# Patient Record
Sex: FEMALE | Race: WHITE | NOT HISPANIC OR LATINO | Employment: OTHER | URBAN - METROPOLITAN AREA
[De-identification: names, ages, dates, MRNs, and addresses within clinical notes are randomized per-mention and may not be internally consistent; named-entity substitution may affect disease eponyms.]

---

## 2018-10-22 ENCOUNTER — OFFICE VISIT (OUTPATIENT)
Dept: URGENT CARE | Age: 83
End: 2018-10-22
Payer: MEDICARE

## 2018-10-22 VITALS
RESPIRATION RATE: 16 BRPM | TEMPERATURE: 97.4 F | BODY MASS INDEX: 23.74 KG/M2 | SYSTOLIC BLOOD PRESSURE: 165 MMHG | HEIGHT: 63 IN | WEIGHT: 134 LBS | HEART RATE: 76 BPM | DIASTOLIC BLOOD PRESSURE: 74 MMHG | OXYGEN SATURATION: 99 %

## 2018-10-22 DIAGNOSIS — S51.811A SKIN TEAR OF RIGHT FOREARM WITHOUT COMPLICATION, INITIAL ENCOUNTER: ICD-10-CM
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
## 2018-10-22 DIAGNOSIS — S09.90XA INJURY OF HEAD, INITIAL ENCOUNTER: Primary | ICD-10-CM

## 2018-10-22 DIAGNOSIS — T14.8XXA HEMATOMA AND CONTUSION: ICD-10-CM

## 2018-10-22 DIAGNOSIS — M25.511 ACUTE PAIN OF RIGHT SHOULDER: ICD-10-CM

## 2018-10-22 PROCEDURE — 99203 OFFICE O/P NEW LOW 30 MIN: CPT | Performed by: NURSE PRACTITIONER

## 2018-10-22 PROCEDURE — G0463 HOSPITAL OUTPT CLINIC VISIT: HCPCS | Performed by: NURSE PRACTITIONER

## 2018-10-22 RX ORDER — METOPROLOL SUCCINATE 50 MG/1
TABLET, EXTENDED RELEASE ORAL
COMMUNITY
Start: 2018-10-18

## 2018-10-22 RX ORDER — LISINOPRIL 20 MG/1
TABLET ORAL
COMMUNITY
Start: 2018-09-04

## 2018-10-22 RX ORDER — CLOPIDOGREL BISULFATE 75 MG/1
TABLET ORAL
COMMUNITY
Start: 2018-10-12

## 2018-10-22 RX ORDER — FENOFIBRATE 48 MG/1
TABLET, COATED ORAL
COMMUNITY
Start: 2018-09-04

## 2018-10-22 RX ORDER — LEVOTHYROXINE SODIUM 0.1 MG/1
TABLET ORAL
COMMUNITY
Start: 2018-07-24

## 2018-10-22 RX ORDER — ATORVASTATIN CALCIUM 40 MG/1
TABLET, FILM COATED ORAL
COMMUNITY
Start: 2018-09-11

## 2018-10-22 RX ORDER — AMLODIPINE BESYLATE 5 MG/1
TABLET ORAL
COMMUNITY
Start: 2018-09-12

## 2018-10-22 RX ORDER — ASPIRIN 81 MG/1
81 TABLET ORAL DAILY
COMMUNITY

## 2018-10-22 NOTE — PROGRESS NOTES
[facility]        NAME: Suzi Rodriguez is a 81 y o  female  : 1935    MRN: [id number]  DATE: 2018  TIME: 2:36 PM    Assessment and Plan   Injury of head, initial encounter [S09 90XA]  1  Injury of head, initial encounter  Transfer to other facility   2  Hematoma and contusion  Transfer to other facility   3  Fall, initial encounter  Transfer to other facility   4  Acute pain of right shoulder  Transfer to other facility   5  Skin tear of right forearm without complication, initial encounter  Transfer to other facility         Patient Instructions     Patient to proceed to ED for further evaluation  Patient stable upon discharge from office  Follow up with PCP in 3-5 days  Proceed to  ER if symptoms worsen  Chief Complaint     Chief Complaint   Patient presents with    Fall     Pt reports she was standing on her record cabinet on Saturday to hang some curtains when she fell backwards  Pt c/o pain to right arm, right shoulder, and bump on back of head  Pt reports she is on Plavix and 81mg ASA  History of Present Illness       81-year-old female presenting today with reports of fall sustained 2 days ago  She states she was standing on her record cabinet, approximately 2 1/2 feet high, hanging her curtains when she fell backwards  She states she hit her head onto the floor upon impact and her right arm / shoulder on the coffee table  No LOC  She is currently taking plavix and baby ASA  She states she also took two advil yesterday for discomfort  She sustained a 1" skin tear of the right forearm, which she cleansed and covered with neosporin and DSD  No current bleeding  Over the last couple days, she started with significant swelling and ecchymosis along right arm and scapula  She also reports having an "egg" on the back of her head  No headache, dizziness, n/v, blurred vision, eye pressure, etc  Her main concern is the arm pain and bruising   Her  wanted her to proceed to ED for evaluation following the fall but patient declined evaluation  She presents here today on insistence of her   Fall         Review of Systems   Review of Systems   Constitutional: Negative  HENT: Negative  Eyes: Negative  Respiratory: Negative  Cardiovascular: Negative  Musculoskeletal: Positive for arthralgias and myalgias  Skin: Positive for color change and wound  Neurological: Negative  Psychiatric/Behavioral: Negative  Current Medications       Current Outpatient Prescriptions:     amLODIPine (NORVASC) 5 mg tablet, , Disp: , Rfl:     aspirin (ECOTRIN LOW STRENGTH) 81 mg EC tablet, Take 81 mg by mouth daily, Disp: , Rfl:     atorvastatin (LIPITOR) 40 mg tablet, , Disp: , Rfl:     clopidogrel (PLAVIX) 75 mg tablet, , Disp: , Rfl:     fenofibrate (TRICOR) 48 mg tablet, , Disp: , Rfl:     levothyroxine 100 mcg tablet, , Disp: , Rfl:     lisinopril (ZESTRIL) 20 mg tablet, , Disp: , Rfl:     metoprolol succinate (TOPROL-XL) 50 mg 24 hr tablet, , Disp: , Rfl:     Current Allergies     Allergies as of 10/22/2018    (No Known Allergies)            The following portions of the patient's history were reviewed and updated as appropriate: allergies, current medications, past family history, past medical history, past social history, past surgical history and problem list      Past Medical History:   Diagnosis Date    Disease of thyroid gland     Hyperlipidemia     Hypertension     MI [facility]myocardial infarction) ([facility] [address] ) 04/2018    Tubal pregnancy        Past Surgical History:   Procedure Laterality Date    HYSTERECTOMY         History reviewed  No pertinent family history  Medications have been verified          Objective   /74   Pulse 76   Temp (!) 97 4 °F (36 3 °C) (Tympanic)   Resp 16   Ht 5' 3" (1 6 m)   Wt 60 8 kg (134 lb)   SpO2 99%   BMI 23 74 kg/m²        Physical Exam     Physical Exam   Constitutional: She is oriented to person, place, and time  She appears well-developed and well-nourished  No distress  Eyes: Pupils are equal, round, and reactive to light  Conjunctivae and EOM are normal    Cardiovascular: Normal rate, regular rhythm and normal heart sounds  Pulmonary/Chest: Effort normal and breath sounds normal    Neurological: She is alert and oriented to person, place, and time  She has normal strength  No cranial nerve deficit or sensory deficit  GCS eye subscore is 4  GCS verbal subscore is 5  GCS motor subscore is 6  Skin: Skin is warm and dry  Ecchymosis (Significant ecchymosis with swelling noted along right arm and on right scapula ) noted  Psychiatric: She has a normal mood and affect  Her behavior is normal  Judgment and thought content normal    Nursing note and vitals reviewed

## 2019-09-16 ENCOUNTER — TRANSCRIBE ORDERS (OUTPATIENT)
Dept: ADMINISTRATIVE | Facility: HOSPITAL | Age: 84
End: 2019-09-16

## 2019-09-16 DIAGNOSIS — Z78.0 ASYMPTOMATIC POSTMENOPAUSAL STATUS (AGE-RELATED) (NATURAL): Primary | ICD-10-CM

## 2019-09-30 ENCOUNTER — HOSPITAL ENCOUNTER (OUTPATIENT)
Dept: RADIOLOGY | Facility: IMAGING CENTER | Age: 84
Discharge: HOME | End: 2019-09-30
Payer: MEDICARE

## 2019-09-30 DIAGNOSIS — Z78.0 ASYMPTOMATIC POSTMENOPAUSAL STATUS (AGE-RELATED) (NATURAL): ICD-10-CM

## 2019-09-30 PROCEDURE — 77080 DXA BONE DENSITY AXIAL: CPT

## 2020-02-06 ENCOUNTER — TRANSCRIBE ORDERS (OUTPATIENT)
Dept: ADMINISTRATIVE | Facility: HOSPITAL | Age: 85
End: 2020-02-06

## 2020-02-06 DIAGNOSIS — R15.2 DEFECATION URGENCY: ICD-10-CM

## 2020-02-06 DIAGNOSIS — R15.1 SOILING: Primary | ICD-10-CM

## 2020-02-06 DIAGNOSIS — R63.4 LOSS OF WEIGHT: ICD-10-CM

## 2020-02-06 DIAGNOSIS — R19.7 DIARRHEA OF PRESUMED INFECTIOUS ORIGIN: ICD-10-CM

## 2020-02-07 ENCOUNTER — HOSPITAL ENCOUNTER (OUTPATIENT)
Dept: RADIOLOGY | Facility: IMAGING CENTER | Age: 85
Discharge: HOME | End: 2020-02-07
Payer: MEDICARE

## 2020-02-07 DIAGNOSIS — R15.2 DEFECATION URGENCY: ICD-10-CM

## 2020-02-07 DIAGNOSIS — R15.1 SOILING: ICD-10-CM

## 2020-02-07 DIAGNOSIS — R63.4 LOSS OF WEIGHT: ICD-10-CM

## 2020-02-07 DIAGNOSIS — R19.7 DIARRHEA OF PRESUMED INFECTIOUS ORIGIN: ICD-10-CM

## 2020-02-07 PROCEDURE — 74177 CT ABD & PELVIS W/CONTRAST: CPT

## 2020-02-07 RX ADMIN — IOHEXOL 100 ML: 350 INJECTION, SOLUTION INTRAVENOUS at 10:56

## 2020-02-10 ENCOUNTER — APPOINTMENT (OUTPATIENT)
Dept: LAB | Facility: IMAGING CENTER | Age: 85
End: 2020-02-10
Payer: MEDICARE

## 2020-02-10 DIAGNOSIS — R15.1 SOILING: ICD-10-CM

## 2020-02-10 DIAGNOSIS — R19.7 DIARRHEA OF PRESUMED INFECTIOUS ORIGIN: ICD-10-CM

## 2020-02-10 DIAGNOSIS — R15.2 DEFECATION URGENCY: ICD-10-CM

## 2020-02-10 DIAGNOSIS — R63.4 LOSS OF WEIGHT: ICD-10-CM

## 2020-02-10 PROCEDURE — 87015 SPECIMEN INFECT AGNT CONCNTJ: CPT

## 2020-02-10 PROCEDURE — 87177 OVA AND PARASITES SMEARS: CPT

## 2020-02-10 PROCEDURE — 87505 NFCT AGENT DETECTION GI: CPT

## 2020-02-10 PROCEDURE — 87209 SMEAR COMPLEX STAIN: CPT

## 2020-02-10 PROCEDURE — 87206 SMEAR FLUORESCENT/ACID STAI: CPT

## 2020-02-11 ENCOUNTER — TRANSCRIBE ORDERS (OUTPATIENT)
Dept: LAB | Facility: HOSPITAL | Age: 85
End: 2020-02-11

## 2020-02-11 DIAGNOSIS — R15.1 SOILING: Primary | ICD-10-CM

## 2020-02-11 DIAGNOSIS — R15.2 DEFECATION URGENCY: ICD-10-CM

## 2020-02-11 DIAGNOSIS — R63.4 LOSS OF WEIGHT: ICD-10-CM

## 2020-02-11 DIAGNOSIS — R19.7 DIARRHEA OF PRESUMED INFECTIOUS ORIGIN: ICD-10-CM

## 2020-02-11 LAB
C DIFF TOX GENS STL QL NAA+PROBE: NEGATIVE
CAMPYLOBACTER DNA SPEC NAA+PROBE: NORMAL
SALMONELLA DNA SPEC QL NAA+PROBE: NORMAL
SHIGA TOXIN STX GENE SPEC NAA+PROBE: NORMAL
SHIGELLA DNA SPEC QL NAA+PROBE: NORMAL

## 2020-02-12 LAB
CRYPTOSP STL QL ACID FAST STN: NORMAL
G LAMBLIA AG STL QL IA: NEGATIVE
I BELLI SPEC QL ACID FAST MOD KINY STN: NORMAL

## 2020-02-13 ENCOUNTER — APPOINTMENT (OUTPATIENT)
Dept: LAB | Facility: IMAGING CENTER | Age: 85
End: 2020-02-13
Payer: MEDICARE

## 2020-02-13 DIAGNOSIS — R15.1 SOILING: ICD-10-CM

## 2020-02-13 DIAGNOSIS — R15.2 DEFECATION URGENCY: ICD-10-CM

## 2020-02-13 DIAGNOSIS — R63.4 LOSS OF WEIGHT: ICD-10-CM

## 2020-02-13 DIAGNOSIS — R19.7 DIARRHEA OF PRESUMED INFECTIOUS ORIGIN: ICD-10-CM

## 2020-02-13 LAB — O+P STL CONC: NORMAL

## 2020-02-13 PROCEDURE — 82705 FATS/LIPIDS FECES QUAL: CPT

## 2020-02-14 LAB
FAT STL QL: NORMAL
NEUTRAL FAT STL QL: NORMAL

## 2024-07-29 ENCOUNTER — APPOINTMENT (INPATIENT)
Dept: RADIOLOGY | Facility: HOSPITAL | Age: 89
DRG: 091 | End: 2024-07-29
Payer: MEDICARE

## 2024-07-29 ENCOUNTER — HOSPITAL ENCOUNTER (INPATIENT)
Facility: HOSPITAL | Age: 89
LOS: 4 days | Discharge: NURSING, REHAB OR LONG-TERM CARE | DRG: 091 | End: 2024-08-02
Attending: EMERGENCY MEDICINE | Admitting: INTERNAL MEDICINE
Payer: MEDICARE

## 2024-07-29 ENCOUNTER — APPOINTMENT (EMERGENCY)
Dept: RADIOLOGY | Facility: HOSPITAL | Age: 89
DRG: 091 | End: 2024-07-29
Payer: MEDICARE

## 2024-07-29 DIAGNOSIS — R62.7 FAILURE TO THRIVE IN ADULT: ICD-10-CM

## 2024-07-29 DIAGNOSIS — I48.91 RAPID ATRIAL FIBRILLATION (HCC): Primary | ICD-10-CM

## 2024-07-29 DIAGNOSIS — F32.A DEPRESSIVE DISORDER: ICD-10-CM

## 2024-07-29 PROBLEM — N18.30 STAGE 3 CHRONIC KIDNEY DISEASE (HCC): Status: ACTIVE | Noted: 2024-07-29

## 2024-07-29 PROBLEM — K81.9 CHOLECYSTITIS: Status: ACTIVE | Noted: 2024-05-25

## 2024-07-29 PROBLEM — I25.10 CORONARY ARTERY DISEASE INVOLVING NATIVE CORONARY ARTERY OF NATIVE HEART WITHOUT ANGINA PECTORIS: Status: ACTIVE | Noted: 2018-10-19

## 2024-07-29 PROBLEM — I48.0 PAROXYSMAL ATRIAL FIBRILLATION (HCC): Status: ACTIVE | Noted: 2023-01-25

## 2024-07-29 LAB
2HR DELTA HS TROPONIN: 2 NG/L
4HR DELTA HS TROPONIN: -4 NG/L
ALBUMIN SERPL BCG-MCNC: 2.7 G/DL (ref 3.5–5)
ALP SERPL-CCNC: 35 U/L (ref 34–104)
ALT SERPL W P-5'-P-CCNC: 8 U/L (ref 7–52)
ANION GAP SERPL CALCULATED.3IONS-SCNC: 12 MMOL/L (ref 4–13)
AST SERPL W P-5'-P-CCNC: 15 U/L (ref 13–39)
BASOPHILS # BLD AUTO: 0.02 THOUSANDS/ÂΜL (ref 0–0.1)
BASOPHILS NFR BLD AUTO: 0 % (ref 0–1)
BILIRUB SERPL-MCNC: 0.41 MG/DL (ref 0.2–1)
BILIRUB UR QL STRIP: NEGATIVE
BUN SERPL-MCNC: 16 MG/DL (ref 5–25)
CALCIUM ALBUM COR SERPL-MCNC: 9.2 MG/DL (ref 8.3–10.1)
CALCIUM SERPL-MCNC: 8.2 MG/DL (ref 8.4–10.2)
CARDIAC TROPONIN I PNL SERPL HS: 18 NG/L
CARDIAC TROPONIN I PNL SERPL HS: 22 NG/L
CARDIAC TROPONIN I PNL SERPL HS: 24 NG/L
CHLORIDE SERPL-SCNC: 100 MMOL/L (ref 96–108)
CLARITY UR: CLEAR
CO2 SERPL-SCNC: 23 MMOL/L (ref 21–32)
COLOR UR: YELLOW
CREAT SERPL-MCNC: 0.55 MG/DL (ref 0.6–1.3)
EOSINOPHIL # BLD AUTO: 0.04 THOUSAND/ÂΜL (ref 0–0.61)
EOSINOPHIL NFR BLD AUTO: 1 % (ref 0–6)
ERYTHROCYTE [DISTWIDTH] IN BLOOD BY AUTOMATED COUNT: 14.8 % (ref 11.6–15.1)
GFR SERPL CREATININE-BSD FRML MDRD: 84 ML/MIN/1.73SQ M
GLUCOSE SERPL-MCNC: 100 MG/DL (ref 65–140)
GLUCOSE UR STRIP-MCNC: NEGATIVE MG/DL
HCT VFR BLD AUTO: 33 % (ref 34.8–46.1)
HGB BLD-MCNC: 10.9 G/DL (ref 11.5–15.4)
HGB UR QL STRIP.AUTO: ABNORMAL
IMM GRANULOCYTES # BLD AUTO: 0.05 THOUSAND/UL (ref 0–0.2)
IMM GRANULOCYTES NFR BLD AUTO: 1 % (ref 0–2)
KETONES UR STRIP-MCNC: ABNORMAL MG/DL
LACTATE SERPL-SCNC: 0.9 MMOL/L (ref 0.5–2)
LEUKOCYTE ESTERASE UR QL STRIP: NEGATIVE
LYMPHOCYTES # BLD AUTO: 1.55 THOUSANDS/ÂΜL (ref 0.6–4.47)
LYMPHOCYTES NFR BLD AUTO: 25 % (ref 14–44)
MCH RBC QN AUTO: 30.8 PG (ref 26.8–34.3)
MCHC RBC AUTO-ENTMCNC: 33 G/DL (ref 31.4–37.4)
MCV RBC AUTO: 93 FL (ref 82–98)
MONOCYTES # BLD AUTO: 0.49 THOUSAND/ÂΜL (ref 0.17–1.22)
MONOCYTES NFR BLD AUTO: 8 % (ref 4–12)
NEUTROPHILS # BLD AUTO: 3.96 THOUSANDS/ÂΜL (ref 1.85–7.62)
NEUTS SEG NFR BLD AUTO: 65 % (ref 43–75)
NITRITE UR QL STRIP: POSITIVE
NRBC BLD AUTO-RTO: 0 /100 WBCS
PH UR STRIP.AUTO: 6 [PH]
PLATELET # BLD AUTO: 225 THOUSANDS/UL (ref 149–390)
PMV BLD AUTO: 10.8 FL (ref 8.9–12.7)
POTASSIUM SERPL-SCNC: 3.8 MMOL/L (ref 3.5–5.3)
PROT SERPL-MCNC: 5 G/DL (ref 6.4–8.4)
PROT UR STRIP-MCNC: ABNORMAL MG/DL
RBC # BLD AUTO: 3.54 MILLION/UL (ref 3.81–5.12)
SALICYLATES SERPL-MCNC: <5 MG/DL (ref 3–20)
SODIUM SERPL-SCNC: 135 MMOL/L (ref 135–147)
SP GR UR STRIP.AUTO: 1.02 (ref 1–1.03)
UROBILINOGEN UR STRIP-ACNC: <2 MG/DL
WBC # BLD AUTO: 6.11 THOUSAND/UL (ref 4.31–10.16)

## 2024-07-29 PROCEDURE — 84484 ASSAY OF TROPONIN QUANT: CPT

## 2024-07-29 PROCEDURE — 71046 X-RAY EXAM CHEST 2 VIEWS: CPT

## 2024-07-29 PROCEDURE — 80053 COMPREHEN METABOLIC PANEL: CPT

## 2024-07-29 PROCEDURE — 81001 URINALYSIS AUTO W/SCOPE: CPT | Performed by: INTERNAL MEDICINE

## 2024-07-29 PROCEDURE — 85025 COMPLETE CBC W/AUTO DIFF WBC: CPT

## 2024-07-29 PROCEDURE — 96374 THER/PROPH/DIAG INJ IV PUSH: CPT

## 2024-07-29 PROCEDURE — 83605 ASSAY OF LACTIC ACID: CPT

## 2024-07-29 PROCEDURE — 84484 ASSAY OF TROPONIN QUANT: CPT | Performed by: INTERNAL MEDICINE

## 2024-07-29 PROCEDURE — 99223 1ST HOSP IP/OBS HIGH 75: CPT | Performed by: INTERNAL MEDICINE

## 2024-07-29 PROCEDURE — 36415 COLL VENOUS BLD VENIPUNCTURE: CPT

## 2024-07-29 PROCEDURE — 96361 HYDRATE IV INFUSION ADD-ON: CPT

## 2024-07-29 PROCEDURE — 80179 DRUG ASSAY SALICYLATE: CPT

## 2024-07-29 PROCEDURE — 99285 EMERGENCY DEPT VISIT HI MDM: CPT

## 2024-07-29 PROCEDURE — 99285 EMERGENCY DEPT VISIT HI MDM: CPT | Performed by: EMERGENCY MEDICINE

## 2024-07-29 PROCEDURE — 74177 CT ABD & PELVIS W/CONTRAST: CPT

## 2024-07-29 PROCEDURE — 84443 ASSAY THYROID STIM HORMONE: CPT | Performed by: INTERNAL MEDICINE

## 2024-07-29 PROCEDURE — 93005 ELECTROCARDIOGRAM TRACING: CPT

## 2024-07-29 RX ORDER — METOPROLOL TARTRATE 1 MG/ML
5 INJECTION, SOLUTION INTRAVENOUS ONCE
Status: COMPLETED | OUTPATIENT
Start: 2024-07-29 | End: 2024-07-29

## 2024-07-29 RX ORDER — AMOXICILLIN 500 MG/1
1000 CAPSULE ORAL 3 TIMES DAILY
COMMUNITY
Start: 2024-07-27 | End: 2024-08-03

## 2024-07-29 RX ORDER — APIXABAN 2.5 MG/1
2.5 TABLET, FILM COATED ORAL 2 TIMES DAILY
COMMUNITY

## 2024-07-29 RX ORDER — LEVOTHYROXINE SODIUM 88 UG/1
88 TABLET ORAL
Status: DISCONTINUED | OUTPATIENT
Start: 2024-07-30 | End: 2024-08-02 | Stop reason: HOSPADM

## 2024-07-29 RX ORDER — LISINOPRIL 20 MG/1
20 TABLET ORAL DAILY
Status: DISCONTINUED | OUTPATIENT
Start: 2024-07-30 | End: 2024-08-02 | Stop reason: HOSPADM

## 2024-07-29 RX ORDER — LANOLIN ALCOHOL/MO/W.PET/CERES
1 CREAM (GRAM) TOPICAL 2 TIMES DAILY WITH MEALS
Status: DISCONTINUED | OUTPATIENT
Start: 2024-07-30 | End: 2024-08-02 | Stop reason: HOSPADM

## 2024-07-29 RX ORDER — AMOXICILLIN 500 MG/1
1000 CAPSULE ORAL EVERY 8 HOURS SCHEDULED
Status: DISCONTINUED | OUTPATIENT
Start: 2024-07-29 | End: 2024-08-02 | Stop reason: HOSPADM

## 2024-07-29 RX ORDER — DILTIAZEM HYDROCHLORIDE 240 MG/1
240 CAPSULE, COATED, EXTENDED RELEASE ORAL DAILY
Status: DISCONTINUED | OUTPATIENT
Start: 2024-07-30 | End: 2024-08-02 | Stop reason: HOSPADM

## 2024-07-29 RX ORDER — SODIUM CHLORIDE 0.9 % (FLUSH) 0.9 %
10 SYRINGE (ML) INJECTION
COMMUNITY
Start: 2024-07-11 | End: 2024-08-17

## 2024-07-29 RX ORDER — CALCIUM CARBONATE/VITAMIN D3 600 MG-10
TABLET ORAL EVERY OTHER DAY
COMMUNITY
Start: 2024-06-13

## 2024-07-29 RX ORDER — ONDANSETRON 2 MG/ML
4 INJECTION INTRAMUSCULAR; INTRAVENOUS EVERY 6 HOURS PRN
Status: DISCONTINUED | OUTPATIENT
Start: 2024-07-29 | End: 2024-08-02 | Stop reason: HOSPADM

## 2024-07-29 RX ORDER — SODIUM CHLORIDE, SODIUM GLUCONATE, SODIUM ACETATE, POTASSIUM CHLORIDE, MAGNESIUM CHLORIDE, SODIUM PHOSPHATE, DIBASIC, AND POTASSIUM PHOSPHATE .53; .5; .37; .037; .03; .012; .00082 G/100ML; G/100ML; G/100ML; G/100ML; G/100ML; G/100ML; G/100ML
100 INJECTION, SOLUTION INTRAVENOUS CONTINUOUS
Status: DISPENSED | OUTPATIENT
Start: 2024-07-29 | End: 2024-07-30

## 2024-07-29 RX ORDER — DILTIAZEM HYDROCHLORIDE 240 MG/1
240 CAPSULE, COATED, EXTENDED RELEASE ORAL DAILY
COMMUNITY
Start: 2024-07-27

## 2024-07-29 RX ORDER — FLUTICASONE PROPIONATE 50 MCG
2 SPRAY, SUSPENSION (ML) NASAL DAILY
Status: DISCONTINUED | OUTPATIENT
Start: 2024-07-30 | End: 2024-08-02 | Stop reason: HOSPADM

## 2024-07-29 RX ORDER — METOPROLOL SUCCINATE 100 MG/1
100 TABLET, EXTENDED RELEASE ORAL 2 TIMES DAILY
Status: DISCONTINUED | OUTPATIENT
Start: 2024-07-29 | End: 2024-08-02 | Stop reason: HOSPADM

## 2024-07-29 RX ORDER — ACETAMINOPHEN 325 MG/1
650 TABLET ORAL EVERY 6 HOURS PRN
Status: DISCONTINUED | OUTPATIENT
Start: 2024-07-29 | End: 2024-08-02 | Stop reason: HOSPADM

## 2024-07-29 RX ORDER — MOMETASONE FUROATE MONOHYDRATE 50 UG/1
2 SPRAY, METERED NASAL DAILY
COMMUNITY
Start: 2024-06-13

## 2024-07-29 RX ORDER — ATORVASTATIN CALCIUM 40 MG/1
40 TABLET, FILM COATED ORAL
Status: DISCONTINUED | OUTPATIENT
Start: 2024-07-30 | End: 2024-08-02 | Stop reason: HOSPADM

## 2024-07-29 RX ORDER — IBANDRONATE SODIUM 150 MG/1
150 TABLET, FILM COATED ORAL
COMMUNITY
Start: 2024-06-20 | End: 2024-08-17

## 2024-07-29 RX ADMIN — IOHEXOL 80 ML: 350 INJECTION, SOLUTION INTRAVENOUS at 23:43

## 2024-07-29 RX ADMIN — METOPROLOL TARTRATE 5 MG: 1 INJECTION, SOLUTION INTRAVENOUS at 18:03

## 2024-07-29 RX ADMIN — METOROPROLOL TARTRATE 5 MG: 5 INJECTION, SOLUTION INTRAVENOUS at 21:27

## 2024-07-29 RX ADMIN — SODIUM CHLORIDE 1000 ML: 0.9 INJECTION, SOLUTION INTRAVENOUS at 18:00

## 2024-07-29 RX ADMIN — METOPROLOL SUCCINATE 100 MG: 100 TABLET, EXTENDED RELEASE ORAL at 21:26

## 2024-07-29 RX ADMIN — SODIUM CHLORIDE, SODIUM GLUCONATE, SODIUM ACETATE, POTASSIUM CHLORIDE, MAGNESIUM CHLORIDE, SODIUM PHOSPHATE, DIBASIC, AND POTASSIUM PHOSPHATE 100 ML/HR: .53; .5; .37; .037; .03; .012; .00082 INJECTION, SOLUTION INTRAVENOUS at 23:41

## 2024-07-29 RX ADMIN — AMOXICILLIN 1000 MG: 500 CAPSULE ORAL at 22:15

## 2024-07-29 RX ADMIN — APIXABAN 2.5 MG: 2.5 TABLET, FILM COATED ORAL at 22:15

## 2024-07-29 NOTE — ED PROVIDER NOTES
History  Chief Complaint   Patient presents with    Failure To Thrive     Pt sent in by EMS by home healthcare for lack of appetite and unwillingness to get out of bed. Pt c/o some dizziness. Recent hx of a-fib, bacteremia, and gallstones. Recently started on Cardizem.      Patient is an 89-year-old female, past medical history afib with RVR, hypertension, cholecystitis s/p cholecystostomy tube placed 5/28/24, presenting today with a complaint of some lightheadedness. She had had a decreased appetite and has not been eating, family sent her in because she did not want to get out of bed today. She says she has not been taking her medications because she has not been eating.  She also notes she did not urinate yesterday, but was able to this morning.    Denies headache, chest pain, abdominal pain, nausea, vomiting, diarrhea, constipation, blood in her stool, blood in her urine, pain with urination. She has no acute vision changes, admits to blurry vision but says it is not new.    Per EMS her blood glucose was in the 50s, treated with D10. Family was unhappy with care at [facility] so they sent her here, noted that they are unable to care for her at home and are seeking nursing home placement. EMS notes that she has been in afib RVR with a heart rate between 100 and 130 and jumping as high as 200.      History provided by:  EMS personnel and patient      Prior to Admission Medications   Prescriptions Last Dose Informant Patient Reported? Taking?   Calcium Carb-Cholecalciferol 600-10 MG-MCG TABS   Yes Yes   Sig: Take by mouth every other day   Eliquis 2.5 MG   Yes No   Sig: Take 2.5 mg by mouth 2 (two) times a day   amLODIPine (NORVASC) 5 mg tablet Not Taking Self Yes No   Patient not taking: Reported on 7/29/2024   amoxicillin (AMOXIL) 500 mg capsule   Yes Yes   Sig: Take 1,000 mg by mouth Three times a day   aspirin (ECOTRIN LOW STRENGTH) 81 mg EC tablet Not Taking Self Yes No   Sig: Take 81 mg by mouth daily    Patient not taking: Reported on 2024   atorvastatin (LIPITOR) 40 mg tablet  Self Yes No   clopidogrel (PLAVIX) 75 mg tablet Not Taking Self Yes No   Patient not taking: Reported on 2024   diltiazem (CARDIZEM CD) 240 mg 24 hr capsule   Yes Yes   Sig: Take 240 mg by mouth daily   fenofibrate (TRICOR) 48 mg tablet  Self Yes No   ibandronate (BONIVA) 150 MG tablet   Yes Yes   Sig: Take 150 mg by mouth Every month   levothyroxine 100 mcg tablet  Self Yes No   lisinopril (ZESTRIL) 20 mg tablet  Self Yes No   metoprolol succinate (TOPROL-XL) 50 mg 24 hr tablet  Self Yes No   Sig: Take 100 mg by mouth 2 (two) times a day   mometasone (NASONEX) 50 mcg/act nasal spray   Yes Yes   Si sprays into each nostril daily   sertraline (ZOLOFT) 50 mg tablet   Yes Yes   Sig: Take 50 mg by mouth daily   sodium chloride 0.9 % SOLN   Yes Yes   Sig: 10 mL by Intracatheter route      Facility-Administered Medications: None       Past Medical History:   Diagnosis Date    Disease of thyroid gland     Hyperlipidemia     Hypertension     MI (myocardial infarction) (HCC) 2018    Tubal pregnancy        Past Surgical History:   Procedure Laterality Date    HYSTERECTOMY         No family history on file.  I have reviewed and agree with the history as documented.    E-Cigarette/Vaping     E-Cigarette/Vaping Substances     Social History     Tobacco Use    Smoking status: Never    Smokeless tobacco: Never   Substance Use Topics    Alcohol use: No    Drug use: No        Review of Systems    Physical Exam  ED Triage Vitals   Temperature Pulse Respirations Blood Pressure SpO2   24 1658 24 1658 24 1658 24 1658 24 1658   98.7 °F (37.1 °C) (!) 123 18 147/71 97 %      Temp Source Heart Rate Source Patient Position - Orthostatic VS BP Location FiO2 (%)   24 1658 24 1658 24 1658 24 165 --   Axillary Monitor Lying Right arm       Pain Score       24 1807       No Pain              Orthostatic Vital Signs  Vitals:    07/29/24 1658 07/29/24 1807 07/29/24 1900   BP: 147/71 (!) 173/78 166/92   Pulse: (!) 123 95 96   Patient Position - Orthostatic VS: Lying Lying        Physical Exam  Vitals and nursing note reviewed.   Constitutional:       General: She is awake. She is not in acute distress.     Appearance: She is well-developed and underweight. She is not ill-appearing or diaphoretic.   HENT:      Head: Normocephalic and atraumatic.      Mouth/Throat:      Mouth: Mucous membranes are dry.      Pharynx: Oropharynx is clear.   Eyes:      Conjunctiva/sclera: Conjunctivae normal.   Cardiovascular:      Rate and Rhythm: Tachycardia present. Rhythm irregular.      Heart sounds: Murmur heard.   Pulmonary:      Effort: Pulmonary effort is normal. No respiratory distress.      Breath sounds: Normal breath sounds. No wheezing.   Abdominal:      General: There is no distension.      Palpations: Abdomen is soft.      Tenderness: There is no abdominal tenderness.      Comments: Cholecystostomy tube, draining, no erythema around bandaging.   Musculoskeletal:         General: No swelling.      Cervical back: Neck supple.      Right lower leg: No edema.      Left lower leg: No edema.   Skin:     General: Skin is warm and dry.      Capillary Refill: Capillary refill takes less than 2 seconds.   Neurological:      Mental Status: She is alert and oriented to person, place, and time.   Psychiatric:         Mood and Affect: Mood normal.         ED Medications  Medications   amoxicillin (AMOXIL) capsule 1,000 mg (has no administration in time range)   atorvastatin (LIPITOR) tablet 40 mg (has no administration in time range)   calcium carbonate-vitamin D 500 mg-5 mcg tablet 1 tablet (has no administration in time range)   diltiazem (CARDIZEM CD) 24 hr capsule 240 mg (has no administration in time range)   apixaban (ELIQUIS) tablet 2.5 mg (has no administration in time range)   levothyroxine tablet 88 mcg (has  no administration in time range)   lisinopril (ZESTRIL) tablet 20 mg (has no administration in time range)   metoprolol succinate (TOPROL-XL) 24 hr tablet 100 mg (has no administration in time range)   fluticasone (FLONASE) 50 mcg/act nasal spray 2 spray (has no administration in time range)   sertraline (ZOLOFT) tablet 50 mg (has no administration in time range)   metoprolol (LOPRESSOR) injection 5 mg (has no administration in time range)   sodium chloride 0.9 % bolus 1,000 mL (0 mL Intravenous Stopped 7/29/24 1900)   metoprolol (LOPRESSOR) injection 5 mg (5 mg Intravenous Given 7/29/24 1803)       Diagnostic Studies  Results Reviewed       Procedure Component Value Units Date/Time    HS Troponin I 2hr [[id number]] Collected: 07/29/24 2116    Lab Status: No result Specimen: Blood from Arm, Left     HS Troponin I 4hr [[id number]]     Lab Status: No result Specimen: Blood     Comprehensive metabolic panel [[id number]]  (Abnormal) Collected: 07/29/24 1857    Lab Status: Final result Specimen: Blood from Arm, Left Updated: 07/29/24 1930     Sodium 135 mmol/L      Potassium 3.8 mmol/L      Chloride 100 mmol/L      CO2 23 mmol/L      ANION GAP 12 mmol/L      BUN 16 mg/dL      Creatinine 0.55 mg/dL      Glucose 100 mg/dL      Calcium 8.2 mg/dL      Corrected Calcium 9.2 mg/dL      AST 15 U/L      ALT 8 U/L      Alkaline Phosphatase 35 U/L      Total Protein 5.0 g/dL      Albumin 2.7 g/dL      Total Bilirubin 0.41 mg/dL      eGFR 84 ml/min/1.73sq m     Narrative:      National Kidney Disease Foundation guidelines for Chronic Kidney Disease (CKD):     Stage 1 with normal or high GFR (GFR > 90 mL/min/1.73 square meters)    Stage 2 Mild CKD (GFR = 60-89 mL/min/1.73 square meters)    Stage 3A Moderate CKD (GFR = 45-59 mL/min/1.73 square meters)    Stage 3B Moderate CKD (GFR = 30-44 mL/min/1.73 square meters)    Stage 4 Severe CKD (GFR = 15-29 mL/min/1.73 square meters)    Stage 5 End Stage CKD (GFR <15 mL/min/1.73 square  meters)  Note: GFR calculation is accurate only with a steady state creatinine    Salicylate level [[id number]]  (Normal) Collected: 07/29/24 1755    Lab Status: Final result Specimen: Blood from Arm, Left Updated: 07/29/24 1840     Salicylate Lvl <5 mg/dL     HS Troponin 0hr (reflex protocol) [[id number]]  (Normal) Collected: 07/29/24 1755    Lab Status: Final result Specimen: Blood from Arm, Left Updated: 07/29/24 1835     hs TnI 0hr 22 ng/L     Lactic acid, plasma (w/reflex if result > 2.0) [[id number]]  (Normal) Collected: 07/29/24 1758    Lab Status: Final result Specimen: Blood from Arm, Left Updated: 07/29/24 1830     LACTIC ACID 0.9 mmol/L     Narrative:      Result may be elevated if tourniquet was used during collection.    CBC and differential [[id number]]  (Abnormal) Collected: 07/29/24 1755    Lab Status: Final result Specimen: Blood from Arm, Left Updated: 07/29/24 1809     WBC 6.11 Thousand/uL      RBC 3.54 Million/uL      Hemoglobin 10.9 g/dL      Hematocrit 33.0 %      MCV 93 fL      MCH 30.8 pg      MCHC 33.0 g/dL      RDW 14.8 %      MPV 10.8 fL      Platelets 225 Thousands/uL      nRBC 0 /100 WBCs      Segmented % 65 %      Immature Grans % 1 %      Lymphocytes % 25 %      Monocytes % 8 %      Eosinophils Relative 1 %      Basophils Relative 0 %      Absolute Neutrophils 3.96 Thousands/µL      Absolute Immature Grans 0.05 Thousand/uL      Absolute Lymphocytes 1.55 Thousands/µL      Absolute Monocytes 0.49 Thousand/µL      Eosinophils Absolute 0.04 Thousand/µL      Basophils Absolute 0.02 Thousands/µL     UA w Reflex to Microscopic w Reflex to Culture [[id number]]     Lab Status: No result Specimen: Urine                    XR chest 2 views   ED Interpretation by Pamella Santizo DO (07/29 1801)   I do not appreciate any acute cardiopulmonary findings.      Final Result by Katya Jaime MD (07/29 2018)      No definite acute pulmonary parenchymal disease.      Probable mild pulmonary fibrosis.       Trace effusions.            Workstation performed: [id number]               Procedures  ECG 12 Lead Documentation Only    Date/Time: 7/29/2024 6:08 PM    Performed by: Pamella Santizo DO  Authorized by: Pamella Santizo DO    ECG reviewed by me, the ED Provider: yes    Patient location:  ED  Previous ECG:     Previous ECG:  Unavailable  Interpretation:     Interpretation: abnormal    Quality:     Tracing quality:  Limited by artifact  Rate:     ECG rate assessment: tachycardic    Rhythm:     Rhythm: atrial fibrillation    QRS:     QRS axis:  Normal    QRS intervals:  Normal  ST segments:     ST segments:  Normal        ED Course  ED Course as of 07/29/24 2120 Mon Jul 29, 2024 1810 Hemoglobin(!): 10.9   1812 Pulse: 95  Responded to metoprolol   1813 Patient states dizziness/lightheadedness has improved slightly since she has been here, but not resolved.   1839 hs TnI 0hr: 22 2043 She feels better, asked for something to eat. She is eating a turkey sandwich and a cookie.                              SBIRT 20yo+      Flowsheet Row Most Recent Value   Initial Alcohol Screen: US AUDIT-C     1. How often do you have a drink containing alcohol? 0 Filed at: 07/29/2024 1809   2. How many drinks containing alcohol do you have on a typical day you are drinking?  0 Filed at: 07/29/2024 1809   3a. Male UNDER 65: How often do you have five or more drinks on one occasion? 0 Filed at: 07/29/2024 1809   3b. FEMALE Any Age, or MALE 65+: How often do you have 4 or more drinks on one occassion? 0 Filed at: 07/29/2024 1809   Audit-C Score 0 Filed at: 07/29/2024 1809   [place]: How many times in the past year have you...    Used an illegal drug or used a prescription medication for non-medical reasons? Never Filed at: 07/29/2024 1809                  Medical Decision Making  Patient is a 89 y.o. female  who presents to the ED with failure to thrive.    Vital signs tachycardic, otherwise stable. Exam as listed  above    Differential diagnosis includes but is not limited to afib with RVR possibly secondary to infectious cause (cholecystostomy tube, UTI), dehydration, starvation ketosis, anemia.    Plan   CBC to evaluate for anemia, leukocytosis to rule out infectious cause of symptoms.  CMP to evaluate fluid status, electrolyte derangement  Troponin   UA to rule out UTI   Lactate   Salicylate level to rule out salicylate toxicity as patient is hypoglycemic, fatigued, tachycardic.  ECG to evaluate atrial fibrillation  Chest xray to rule out pneumonia    View ED course above for further discussion on patient workup.     All labs reviewed and utilized in the medical decision making process  All radiology studies independently viewed by me and interpreted by the radiologist.  I reviewed all testing with the patient.         Amount and/or Complexity of Data Reviewed  Independent Historian: EMS  External Data Reviewed: notes.  Labs: ordered. Decision-making details documented in ED Course.  Radiology: ordered and independent interpretation performed.    Risk  Prescription drug management.  Decision regarding hospitalization.          Disposition  Final diagnoses:   Rapid atrial fibrillation (HCC)   Failure to thrive in adult     Time reflects when diagnosis was documented in both MDM as applicable and the Disposition within this note       Time User Action Codes Description Comment    7/29/2024  7:54 PM Dion Jo Add [I48.91] Rapid atrial fibrillation (HCC)     7/29/2024  7:54 PM Dion Jo Add [R62.51] Failure to thrive (child)     7/29/2024  8:32 PM Pamella Santizo Add [R62.7] Failure to thrive in adult     7/29/2024  8:32 PM Pamella Santizo Remove [R62.51] Failure to thrive (child)           ED Disposition       ED Disposition   Admit    Condition   Stable    Date/Time   Mon Jul 29, 2024 2032    Comment   Case was discussed with Dr. Holley and the patient's admission status was agreed to be Admission Status:  inpatient status.               Follow-up Information    None         Patient's Medications   Discharge Prescriptions    No medications on file     No discharge procedures on file.    PDMP Review         Value Time User    PDMP Reviewed  Yes 7/29/2024  9:16 PM Abraham Holley DO             ED Provider  Attending physically available and evaluated Jennifer Patel. I managed the patient along with the ED Attending.    Electronically Signed by           Pamella Santizo DO  07/29/24 7257

## 2024-07-29 NOTE — ED ATTENDING ATTESTATION
7/29/2024  I, Dion Jo MD, saw and evaluated the patient. I have discussed the patient with the resident/non-physician practitioner and agree with the resident's/non-physician practitioner's findings, Plan of Care, and MDM as documented in the resident's/non-physician practitioner's note, except where noted. All available labs and Radiology studies were reviewed.  I was present for key portions of any procedure(s) performed by the resident/non-physician practitioner and I was immediately available to provide assistance.       At this point I agree with the current assessment done in the Emergency Department.  I have conducted an independent evaluation of this patient a history and physical is as follows:    89-year-old woman presenting with failure to thrive.  Per EMS report family to the patient is not eating and is not getting out of bed.  Patient does endorse both of these things.  She tells me she has no appetite.  Denies any abdominal pain, nausea or vomiting.  On initial evaluation patient is pale, tired, malnourished, and chronically ill-appearing.  Head atraumatic normocephalic.  Dry mucous membranes.  Oropharynx clear.  Heart is tachycardic, irregularly irregular, with no murmurs rubs or gallops.  Lungs clear to auscultation bilaterally.  Abdomen is scaphoid, nontender, soft.  Skin warm and dry.  No extremity swelling or edema.  No gross focal neurologic deficit.    Patient had rapid atrial fibrillation with heart rate going up as high as the 140s to 150s.  She was treated with IV fluids and with IV metoprolol.  This led to improvement in heart rate to under 100.  EKG and labs done.  Plan admission given presentation of rapid atrial fibrillation and failure to thrive.    ED Course         Critical Care Time  Procedures

## 2024-07-30 PROBLEM — R26.81 UNSTEADINESS ON FEET: Status: ACTIVE | Noted: 2024-07-30

## 2024-07-30 PROBLEM — E87.8 ELECTROLYTE ABNORMALITY: Status: ACTIVE | Noted: 2024-07-30

## 2024-07-30 PROBLEM — R82.90 ABNORMAL URINALYSIS: Status: ACTIVE | Noted: 2024-07-30

## 2024-07-30 PROBLEM — E43 SEVERE PROTEIN-CALORIE MALNUTRITION (HCC): Status: ACTIVE | Noted: 2024-07-30

## 2024-07-30 LAB
ANION GAP SERPL CALCULATED.3IONS-SCNC: 9 MMOL/L (ref 4–13)
ATRIAL RATE: 125 BPM
BACTERIA UR QL AUTO: ABNORMAL /HPF
BUDDING YEAST: PRESENT
BUN SERPL-MCNC: 12 MG/DL (ref 5–25)
CALCIUM SERPL-MCNC: 7.7 MG/DL (ref 8.4–10.2)
CHLORIDE SERPL-SCNC: 102 MMOL/L (ref 96–108)
CO2 SERPL-SCNC: 27 MMOL/L (ref 21–32)
CREAT SERPL-MCNC: 0.6 MG/DL (ref 0.6–1.3)
ERYTHROCYTE [DISTWIDTH] IN BLOOD BY AUTOMATED COUNT: 14.7 % (ref 11.6–15.1)
FOLATE SERPL-MCNC: 17.6 NG/ML
GFR SERPL CREATININE-BSD FRML MDRD: 81 ML/MIN/1.73SQ M
GLUCOSE SERPL-MCNC: 102 MG/DL (ref 65–140)
HCT VFR BLD AUTO: 31.4 % (ref 34.8–46.1)
HGB BLD-MCNC: 10.4 G/DL (ref 11.5–15.4)
MAGNESIUM SERPL-MCNC: 1.3 MG/DL (ref 1.9–2.7)
MCH RBC QN AUTO: 30.5 PG (ref 26.8–34.3)
MCHC RBC AUTO-ENTMCNC: 33.1 G/DL (ref 31.4–37.4)
MCV RBC AUTO: 92 FL (ref 82–98)
NON-SQ EPI CELLS URNS QL MICRO: ABNORMAL /HPF
PLATELET # BLD AUTO: 209 THOUSANDS/UL (ref 149–390)
PMV BLD AUTO: 10.4 FL (ref 8.9–12.7)
POTASSIUM SERPL-SCNC: 3.2 MMOL/L (ref 3.5–5.3)
QRS AXIS: 70 DEGREES
QRSD INTERVAL: 68 MS
QT INTERVAL: 336 MS
QTC INTERVAL: 488 MS
RBC # BLD AUTO: 3.41 MILLION/UL (ref 3.81–5.12)
RBC #/AREA URNS AUTO: ABNORMAL /HPF
SODIUM SERPL-SCNC: 138 MMOL/L (ref 135–147)
T WAVE AXIS: -59 DEGREES
T4 FREE SERPL-MCNC: 1.49 NG/DL (ref 0.61–1.12)
TSH SERPL DL<=0.05 MIU/L-ACNC: 5.63 UIU/ML (ref 0.45–4.5)
VENTRICULAR RATE: 127 BPM
VIT B12 SERPL-MCNC: 788 PG/ML (ref 180–914)
WBC # BLD AUTO: 5.4 THOUSAND/UL (ref 4.31–10.16)
WBC #/AREA URNS AUTO: ABNORMAL /HPF

## 2024-07-30 PROCEDURE — 99232 SBSQ HOSP IP/OBS MODERATE 35: CPT | Performed by: INTERNAL MEDICINE

## 2024-07-30 PROCEDURE — 80048 BASIC METABOLIC PNL TOTAL CA: CPT | Performed by: INTERNAL MEDICINE

## 2024-07-30 PROCEDURE — 99223 1ST HOSP IP/OBS HIGH 75: CPT | Performed by: INTERNAL MEDICINE

## 2024-07-30 PROCEDURE — 82746 ASSAY OF FOLIC ACID SERUM: CPT | Performed by: INTERNAL MEDICINE

## 2024-07-30 PROCEDURE — 83735 ASSAY OF MAGNESIUM: CPT | Performed by: INTERNAL MEDICINE

## 2024-07-30 PROCEDURE — 93010 ELECTROCARDIOGRAM REPORT: CPT | Performed by: INTERNAL MEDICINE

## 2024-07-30 PROCEDURE — 82607 VITAMIN B-12: CPT | Performed by: INTERNAL MEDICINE

## 2024-07-30 PROCEDURE — 85027 COMPLETE CBC AUTOMATED: CPT | Performed by: INTERNAL MEDICINE

## 2024-07-30 PROCEDURE — 87086 URINE CULTURE/COLONY COUNT: CPT | Performed by: INTERNAL MEDICINE

## 2024-07-30 PROCEDURE — 84439 ASSAY OF FREE THYROXINE: CPT | Performed by: INTERNAL MEDICINE

## 2024-07-30 PROCEDURE — 36415 COLL VENOUS BLD VENIPUNCTURE: CPT | Performed by: INTERNAL MEDICINE

## 2024-07-30 RX ORDER — POTASSIUM CHLORIDE 20 MEQ/1
40 TABLET, EXTENDED RELEASE ORAL ONCE
Status: COMPLETED | OUTPATIENT
Start: 2024-07-30 | End: 2024-07-30

## 2024-07-30 RX ORDER — MAGNESIUM SULFATE HEPTAHYDRATE 40 MG/ML
4 INJECTION, SOLUTION INTRAVENOUS ONCE
Status: COMPLETED | OUTPATIENT
Start: 2024-07-30 | End: 2024-07-30

## 2024-07-30 RX ORDER — METOPROLOL TARTRATE 1 MG/ML
5 INJECTION, SOLUTION INTRAVENOUS EVERY 6 HOURS PRN
Status: DISCONTINUED | OUTPATIENT
Start: 2024-07-30 | End: 2024-08-02 | Stop reason: HOSPADM

## 2024-07-30 RX ADMIN — POTASSIUM CHLORIDE 40 MEQ: 1500 TABLET, EXTENDED RELEASE ORAL at 08:07

## 2024-07-30 RX ADMIN — AMOXICILLIN 1000 MG: 500 CAPSULE ORAL at 14:54

## 2024-07-30 RX ADMIN — ATORVASTATIN CALCIUM 40 MG: 40 TABLET, FILM COATED ORAL at 17:55

## 2024-07-30 RX ADMIN — SERTRALINE HYDROCHLORIDE 50 MG: 50 TABLET ORAL at 08:44

## 2024-07-30 RX ADMIN — METOPROLOL SUCCINATE 100 MG: 100 TABLET, EXTENDED RELEASE ORAL at 08:07

## 2024-07-30 RX ADMIN — AMOXICILLIN 1000 MG: 500 CAPSULE ORAL at 21:00

## 2024-07-30 RX ADMIN — Medication 1 TABLET: at 08:01

## 2024-07-30 RX ADMIN — Medication 1 TABLET: at 17:55

## 2024-07-30 RX ADMIN — MAGNESIUM SULFATE HEPTAHYDRATE 4 G: 40 INJECTION, SOLUTION INTRAVENOUS at 08:12

## 2024-07-30 RX ADMIN — DILTIAZEM HYDROCHLORIDE 240 MG: 240 CAPSULE, COATED, EXTENDED RELEASE ORAL at 08:07

## 2024-07-30 RX ADMIN — AMOXICILLIN 1000 MG: 500 CAPSULE ORAL at 06:02

## 2024-07-30 RX ADMIN — APIXABAN 2.5 MG: 2.5 TABLET, FILM COATED ORAL at 08:07

## 2024-07-30 RX ADMIN — LEVOTHYROXINE SODIUM 88 MCG: 88 TABLET ORAL at 06:02

## 2024-07-30 RX ADMIN — METOPROLOL SUCCINATE 100 MG: 100 TABLET, EXTENDED RELEASE ORAL at 20:59

## 2024-07-30 RX ADMIN — APIXABAN 2.5 MG: 2.5 TABLET, FILM COATED ORAL at 17:55

## 2024-07-30 RX ADMIN — LISINOPRIL 20 MG: 20 TABLET ORAL at 08:07

## 2024-07-30 NOTE — ASSESSMENT & PLAN NOTE
Unsteadiness on feet POA due to dizziness and weakness in the setting of inadequate PO intake a/e/b family reports patient is not getting out of bed, patient reporting dizziness and generalized weakness treated with geriatric medicine consultation, PT/OT evaluation/treatment, fall precautions, and assistance with transfers and ambulation.

## 2024-07-30 NOTE — ASSESSMENT & PLAN NOTE
"Patient presented with tachycardia/lightheadedness, along with ongoing low oral intake/progressive weight loss over the past 2 months of at least 15 pounds  Several recent hospitalizations at [facility] 5/2024 with similar picture found with acute cholecystitis now s/p percutaneous cholecystostomy as patient appears to have been felt to be too high surgical risk, additionally underwent EGD at that time reportedly without concerning findings.  Had subsequent hospitalization earlier this month at same facility with abdominal pain with nausea/vomiting found with leukocytosis and blood cultures with gram-negative bacteremia initially requiring IV antibiotics and transitioned to oral amoxicillin.  Unfortunately following discharge patient with ongoing poor appetite with minimal oral intake and worsening weakness/dizziness which prompted presentation now  With atrial fibrillation with RVR on arrival, management of this as per associated problem.  Labs otherwise without marked abnormalities compared to prior.  Family expressing concern with ongoing decline/weakness  CT abdomen/pelvis with collapsed gallbladder, alicja tube adjacent to the tip of the fundus, no obstruction, small bilateral pleural effusions, diverticulosis without colitis or diverticulitis   TSH/B12/folate acceptable  Nutrition services consult pending   Replete Mg and K, gentle IVF   Consult geriatrics, family feels she has \"given up\" and interested in possible medication to help improve appetite  Further workup as appropriate dependent on clinical course  Family expressed concern as above given ongoing decline/weakness, would like consideration for at least temporary SNF placement.  PT/OT evaluations and CM consult to assist with disposition  "

## 2024-07-30 NOTE — ASSESSMENT & PLAN NOTE
[facility] s/p percutaneous cholecystostomy tube placement; appears patient was felt too high risk that time for surgical intervention.   CT abdomen/pelvis shows tube, no obstruction  Currently on amoxicillin following hospitalization earlier this month at [facility] for gram-negative bacteremia, continue through treatment course  Otherwise continue routine cholecystostomy care

## 2024-07-30 NOTE — ASSESSMENT & PLAN NOTE
Patient presenting with tachycardia/lightheadedness, along with ongoing low oral intake/progressive weight loss over the past 2 months of at least 15 pounds  Several recent hospitalizations at [facility] 5/2024 with similar picture found with acute cholecystitis now s/p percutaneous cholecystostomy as patient appears to have been felt to be too high surgical risk, additionally underwent EGD at that time reportedly without concerning findings.  Had subsequent hospitalization earlier this month at same facility with abdominal pain with nausea/vomiting found with leukocytosis and blood cultures with gram-negative bacteremia initially requiring IV antibiotics and transitioned to oral amoxicillin.  Unfortunately following discharge patient with ongoing poor appetite with minimal oral intake and worsening weakness/dizziness which prompted presentation now  With atrial fibrillation with RVR on arrival, management of this as per associated problem.  Labs otherwise without marked abnormalities compared to prior.  Family expressing concern with ongoing decline/weakness  Will get CT abdomen/pelvis as below to ensure no surgical etiology to explain symptomatology  Check TSH/B12/folate  Nutrition services consult  Further workup as appropriate dependent on clinical course  Family expressed concern as above given ongoing decline/weakness, would like consideration for at least temporary SNF placement.  PT/OT evaluations and CM consult to assist with disposition

## 2024-07-30 NOTE — H&P
[facility]  H&P  Name: Jennifer Patel 89 y.o. female I MRN: [id number]  Unit/Bed#: [facility]B I Date of Admission: 7/29/2024   Date of Service: 7/29/2024 I Hospital Day: 0      Assessment & Plan   * Failure to thrive in adult  Assessment & Plan  Patient presenting with tachycardia/lightheadedness, along with ongoing low oral intake/progressive weight loss over the past 2 months of at least 15 pounds  Several recent hospitalizations at [facility] 5/2024 with similar picture found with acute cholecystitis now s/p percutaneous cholecystostomy as patient appears to have been felt to be too high surgical risk, additionally underwent EGD at that time reportedly without concerning findings.  Had subsequent hospitalization earlier this month at same facility with abdominal pain with nausea/vomiting found with leukocytosis and blood cultures with gram-negative bacteremia initially requiring IV antibiotics and transitioned to oral amoxicillin.  Unfortunately following discharge patient with ongoing poor appetite with minimal oral intake and worsening weakness/dizziness which prompted presentation now  With atrial fibrillation with RVR on arrival, management of this as per associated problem.  Labs otherwise without marked abnormalities compared to prior.  Family expressing concern with ongoing decline/weakness  Will get CT abdomen/pelvis as below to ensure no surgical etiology to explain symptomatology  Check TSH/B12/folate  Nutrition services consult  Further workup as appropriate dependent on clinical course  Family expressed concern as above given ongoing decline/weakness, would like consideration for at least temporary SNF placement.  PT/OT evaluations and CM consult to assist with disposition    Paroxysmal atrial fibrillation with RVR ([facility])  Assessment & Plan  ED arrival patient noted in rapid atrial fibrillation -160.  Patient complaining of dizziness/lightheadedness, admits  that she had not taken medication at least for the past 1 day due to markedly decreased appetite  S/p initial 5 mg IV Lopressor with improvement in heart rates however with recurrent RVR on my evaluation.  Give additional 5 mg IV Lopressor now, restart metoprolol succinate 100 mg twice daily and diltiazem 240 mg daily.  If recurrent RVR low threshold to obtain cardiology consultation  Check TSH, monitor on telemetry  Continue anticoagulation with Eliquis    Cholecystitis  Assessment & Plan  Discovered during hospitalization 5/2024 at [facility], s/p percutaneous cholecystostomy tube placement; appears patient was felt too high risk that time for surgical intervention.   Will get CT abdomen/pelvis to see if there is intra-abdominal pathology to explain primary problem  Currently on amoxicillin following hospitalization earlier this month at [facility] for gram-negative bacteremia, continue through treatment course  Otherwise continue routine cholecystostomy care    Depressive disorder  Assessment & Plan  Mood appears stable, continue home dose sertraline    Benign essential hypertension  Assessment & Plan  Blood pressure elevated on presentation, possibly due to missed medications as well as presentation and hospital  Resume diltiazem 240 mg daily, lisinopril 20 mg daily, and metoprolol succinate 100 mg twice daily with strict hold parameters and monitor blood pressure per protocol             VTE Prophylaxis: Apixaban (Eliquis)  / sequential compression device   Code Status: Level 3 - DNAR and DNI   POLST: POLST form is not discussed and not completed at this time.  Discussion with family: Son Marcel via telephone    Anticipated Length of Stay:  Patient will be admitted on an Inpatient basis with an anticipated length of stay of greater than 2 midnights.   Justification for Hospital Stay: Please see detailed plans noted above.    Chief Complaint:     Dizziness, decreased appetite  History of Present  Illness:  Jennifer Patel is a 89 y.o. female who has a past medical history significant for paroxysmal atrial fibrillation anticoagulated on Eliquis, hypertension, CAD, hyperlipidemia, and several recent hospitalizations at the [facility] particularly 5/2024 with cholecystitis admitted to surgical service now s/p percutaneous cholecystostomy tube as felt to high risk for surgical intervention along with 7/2024 with abdominal pain and nausea/vomiting found with gram-negative bacteremia currently completing antibiotic course with amoxicillin presenting with dizziness and ongoing decreased appetite with poor oral intake.    For the past several months patient and family expressing concern with her limited appetite with minimal oral intake, additionally with at least 15-20 pound weight loss over the past 3 months.  In addition to the cholecystitis found at prior hospitalization she underwent EGD at that time which reportedly did not reveal concerning findings.  Since discharge her most recent hospitalization at [facility] she returned home and has been essentially bedbound with minimal oral intake, additionally patient reporting she has missed medications over at least the past 1 day due to her lack of appetite.  Today she noted dizziness/lightheadedness even at rest along with some nausea without fever/chills, chest pain/pressure, shortness of breath, or abdominal pain and given this and ongoing decreased appetite was brought here for further evaluation.  Son expressing concern regarding lack of etiology found for the decreased appetite as well as her declining picture, would like to consider SNF/rehab placement at least temporarily once stable for discharge.    On ED arrival patient noted in rapid atrial fibrillation -160 improved with IV fluids and 5 mg IV Lopressor, with labs otherwise without marked abnormality compared to prior.  Given failure to thrive picture she is admitted for  further workup and management.    Review of Systems:    Constitutional:  Denies fever or chills but reports fatigue and decreased appetite  Eyes:  Denies change in visual acuity   HENT:  Denies nasal congestion or sore throat   Respiratory:  Denies cough or shortness of breath   Cardiovascular:  Denies chest pain or edema   GI:  Denies abdominal pain, vomiting, bloody stools or diarrhea but reports nausea  :  Denies dysuria   Musculoskeletal:  Denies back pain or joint pain   Integument:  Denies rash   Neurologic:  Denies headache, focal weakness or sensory changes but reported dizziness and lightheadedness  Endocrine:  Denies polyuria or polydipsia   Lymphatic:  Denies swollen glands   Psychiatric:  Denies depression or anxiety     Past Medical and Surgical History:   Past Medical History:   Diagnosis Date    Disease of thyroid gland     Hyperlipidemia     Hypertension     MI (myocardial infarction) (HCC) 04/2018    Tubal pregnancy      Past Surgical History:   Procedure Laterality Date    HYSTERECTOMY         Meds/Allergies:    Current Facility-Administered Medications:     acetaminophen (TYLENOL) tablet 650 mg, 650 mg, Oral, Q6H PRN, Abraham Holley DO    amoxicillin (AMOXIL) capsule 1,000 mg, 1,000 mg, Oral, Q8H GIANNA, Abraham Holley DO, 1,000 mg at 07/29/24 2215    apixaban (ELIQUIS) tablet 2.5 mg, 2.5 mg, Oral, BID, Abraham Holley DO, 2.5 mg at 07/29/24 2215    [START ON 7/30/2024] atorvastatin (LIPITOR) tablet 40 mg, 40 mg, Oral, Daily With Dinner, Abraham Holley DO    [START ON 7/30/2024] calcium carbonate-vitamin D 500 mg-5 mcg tablet 1 tablet, 1 tablet, Oral, BID With Meals, Abraham Holley DO    [START ON 7/30/2024] diltiazem (CARDIZEM CD) 24 hr capsule 240 mg, 240 mg, Oral, Daily, Abraham Holley DO    [START ON 7/30/2024] fluticasone (FLONASE) 50 mcg/act nasal spray 2 spray, 2 spray, Each Nare, Daily, Abraham Holley DO    [START ON 7/30/2024] levothyroxine tablet 88 mcg, 88 mcg, Oral, Early Morning,  Abraham Holley DO    [START ON 7/30/2024] lisinopril (ZESTRIL) tablet 20 mg, 20 mg, Oral, Daily, Abraham Holley DO    metoprolol succinate (TOPROL-XL) 24 hr tablet 100 mg, 100 mg, Oral, BID, Abraham Holley DO, 100 mg at 07/29/24 2126    multi-electrolyte (PLASMALYTE-A/ISOLYTE-S PH 7.4) IV solution, 100 mL/hr, Intravenous, Continuous, Abraham Holley DO    ondansetron (ZOFRAN) injection 4 mg, 4 mg, Intravenous, Q6H PRN, Abraham Holley DO    [START ON 7/30/2024] sertraline (ZOLOFT) tablet 50 mg, 50 mg, Oral, Daily, Abraham Holley DO    Current Outpatient Medications:     amoxicillin (AMOXIL) 500 mg capsule, Take 1,000 mg by mouth Three times a day, Disp: , Rfl:     Calcium Carb-Cholecalciferol 600-10 MG-MCG TABS, Take by mouth every other day, Disp: , Rfl:     diltiazem (CARDIZEM CD) 240 mg 24 hr capsule, Take 240 mg by mouth daily, Disp: , Rfl:     ibandronate (BONIVA) 150 MG tablet, Take 150 mg by mouth Every month, Disp: , Rfl:     mometasone (NASONEX) 50 mcg/act nasal spray, 2 sprays into each nostril daily, Disp: , Rfl:     sertraline (ZOLOFT) 50 mg tablet, Take 50 mg by mouth daily, Disp: , Rfl:     sodium chloride 0.9 % SOLN, 10 mL by Intracatheter route, Disp: , Rfl:     amLODIPine (NORVASC) 5 mg tablet, , Disp: , Rfl:     aspirin (ECOTRIN LOW STRENGTH) 81 mg EC tablet, Take 81 mg by mouth daily (Patient not taking: Reported on 7/29/2024), Disp: , Rfl:     atorvastatin (LIPITOR) 40 mg tablet, , Disp: , Rfl:     clopidogrel (PLAVIX) 75 mg tablet, , Disp: , Rfl:     Eliquis 2.5 MG, Take 2.5 mg by mouth 2 (two) times a day, Disp: , Rfl:     fenofibrate (TRICOR) 48 mg tablet, , Disp: , Rfl:     levothyroxine 100 mcg tablet, , Disp: , Rfl:     lisinopril (ZESTRIL) 20 mg tablet, , Disp: , Rfl:     metoprolol succinate (TOPROL-XL) 50 mg 24 hr tablet, Take 100 mg by mouth 2 (two) times a day, Disp: , Rfl:       Allergies: No Known Allergies  History:  Marital Status: /Civil Union     Substance Use History:    Social History     Substance and Sexual Activity   Alcohol Use No     Social History     Tobacco Use   Smoking Status Never   Smokeless Tobacco Never     Social History     Substance and Sexual Activity   Drug Use No       Family History:  Noncontributory  Physical Exam:     Vitals:   Blood Pressure: 158/79 (07/29/24 2200)  Pulse: 97 (07/29/24 2200)  Temperature: 98.7 °F (37.1 °C) (07/29/24 1658)  Temp Source: Axillary (07/29/24 1658)  Respirations: 22 (07/29/24 2200)  SpO2: 97 % (07/29/24 2200)    Constitutional: Thin and frail appearing, no acute distress, non-toxic appearance   Eyes:  PERRL, conjunctiva normal   HENT:  Atraumatic, external ears normal, nose normal, oropharynx moist, no pharyngeal exudates. Neck- normal range of motion, no tenderness, supple   Respiratory:  No respiratory distress, normal breath sounds, no rales, no wheezing   Cardiovascular: Rapid and irregularly irregular rate and rhythm, no murmurs, no gallops, no rubs   GI:  Soft, nondistended, normal bowel sounds, nontender, no organomegaly, no mass, no rebound, no guarding, cholecystostomy tube present in right upper quadrant with bilious drainage  :  No costovertebral angle tenderness   Musculoskeletal:  No edema, no tenderness, no deformities, diffusely decreased muscle mass. Back- no tenderness  Integument:  Well hydrated, no rash   Lymphatic:  No lymphadenopathy noted   Neurologic:  Alert &awake, communicative, CN 2-12 normal, normal motor function, normal sensory function, no focal deficits noted   Psychiatric:  Speech and behavior appropriate       Lab Results: I have personally reviewed pertinent reports.      Results from last 7 days   Lab Units 07/29/24  1755   WBC Thousand/uL 6.11   HEMOGLOBIN g/dL 10.9*   HEMATOCRIT % 33.0*   PLATELETS Thousands/uL 225   SEGS PCT % 65   LYMPHO PCT % 25   MONO PCT % 8   EOS PCT % 1     Results from last 7 days   Lab Units 07/29/24  1857   POTASSIUM mmol/L 3.8   CHLORIDE mmol/L 100   CO2 mmol/L  23   BUN mg/dL 16   CREATININE mg/dL 0.55*   CALCIUM mg/dL 8.2*   ALK PHOS U/L 35   ALT U/L 8   AST U/L 15           EKG: Personally reviewed, atrial fibrillation with RVR , nonspecific ST ST wave abnormalities    Imaging: I have personally reviewed pertinent reports.   and I have personally reviewed pertinent films in PACS    XR chest 2 views    Result Date: 7/29/2024  Narrative: XR CHEST AP AND LATERAL INDICATION: rule out pneumonia. COMPARISON: Abdomen CT 2/7/2020. FINDINGS: No definite acute pulmonary disease. Mild bibasilar reticulation, likely due to mild fibrosis. Trace effusions. Benign calcified granuloma right base. Normal cardiomediastinal silhouette. Coronary artery stents. Dense mitral annulus calcification. Bones are unremarkable for age. Percutaneous pigtail catheter projecting over the right upper quadrant.     Impression: No definite acute pulmonary parenchymal disease. Probable mild pulmonary fibrosis. Trace effusions. Workstation performed: [id number]     FL barium swallow video w speech    Result Date: 7/26/2024  Narrative: Fluoroscopy Dose: 4.27 mGy  Cumulative Air Kerma Fluoroscopy Time: 1 minutes 4 seconds History: Dysphagia. Technique: Fluoroscopic guided video speech swallowing study 7/26/2024. Study performed utilizing liquid and solid food products of variable consistency, mixed with barium solution. Findings: Fluoroscopic guidance provided for swallowing evaluation performed by speech pathology. Please see the full report by the performing speech pathologist Best possible positioning for patient Minimal penetration was observed. No aspiration was observed    Impression: Impression: Fluoroscopic guidance provided for swallowing evaluation performed by speech pathology. Please see the full report by the performing speech pathologist Workstation:[id number]    IR BILIARY CATH CHECK    Result Date: 7/25/2024  Narrative: EXAM TYPE:  IR BILIARY CATH CHECK EXAM DATE AND TIME:  7/25/2024 8:55  "AM CLINICAL HISTORY PROVIDED: \"Cholecystitis\" Antibiotics/medications given: None Sedation time (minutes): 0 Versed (mg): 0 fentanyl (mcg): 0 Estimated Blood Loss (mL): 0 Fluoroscopy time (minutes): 0.4 CAK Dose (mGy): 1.42 Contrast volume (mL): 10 Contrast type: Omnipaque 300 Contrast route: Via cholecystostomy drain Informed consent for the procedure was obtained. The patient was examined and is in satisfactory condition for planned procedure. After the procedure the patient was recovered and returned to their baseline status, and was deemed fit for discharge from . A time-out was performed with all team members present and in agreement, confirming the correct patient, correct procedure, and correct side/site. The patient was placed in the supine position. The percutaneous cholecystostomy drain was injected with contrast and images obtained. Images were obtained with contrast injection and following decompression. The drain was flushed with sterile saline and placed to gravity drainage. All needles and guidewires were removed intact. FINDINGS: Contrast injected through the existing percutaneous cholecystostomy drain demonstrates a large stone measuring approximately 1.7 cm within the gallbladder. No opacification is seen beyond the stone into the cystic duct or common bile duct. A small amount of debris is seen in the fundus of the gallbladder. COMPLICATIONS: The patient tolerated the procedure well without immediate complication.    Impression: IMPRESSION: Contrast injected through the existing percutaneous cholecystostomy drain demonstrates appropriate position and function of the catheter in the gallbladder. No opacification is seen beyond a large gallstone. No opacification of the cystic or common bile duct is visualized. Workstation:[id number]    XR chest portable    Result Date: 7/21/2024  Narrative: EXAM: XR CHEST 1 VW PORTABLE INDICATION: 89 years  patient, eval for pneumonia COMPARISON(S): Chest x-ray " 6/1/2024 TECHNIQUE: AP portable view           FINDINGS: Lungs/pleura: The lungs are expanded.  Patchy airspace opacity right mid and lower lung. There is no discernable pneumothorax on either side.  Both costophrenic angles are sharp. Cardiac/mediastinum:  Normal size heart. Calcifications of the mitral annulus. An elevated hemidiaphragm is noted on the right. Normal mediastinum and brandy. Normal visualized pulmonary arteries.    Normal visualized aortic arch and descending thoracic aorta. Osseous: Normal visualized thoracic spine.  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 Other: Percutaneous cholecystostomy tube visible in the right upper quadrant abdomen    Impression: IMPRESSION: Patchy airspace opacity right mid and lower lung. Workstation:[email or web address]    CTA abdomen pelvis w wo contrast    Result Date: 7/21/2024  Narrative: History: N/V, leukocytosis 18. s/p cholecystostomy catheter   Exam: CT of the abdomen and pelvis with IV contrast.   Technique: Using helical technique, axial images were obtained through the abdomen and pelvis following administration of 85 cc of Omnipaque 350 intravenous contrast. Coronal and sagittal reformations were performed.   Comparison: CT 5/30/2024     Abdomen: Lung Bases: Patchy consolidation and groundglass opacity in the right lung base. Trace pleural effusions. Left atrial enlargement. Coronary artery and mitral annular calcification. Calcified right middle lobe granuloma. Liver: Normal. Gallbladder/Bile ducts: Cholecystostomy tube in place in the fundus adjacent to large gallstone. There is gallbladder wall hyperemia and pericholecystic fluid and stranding, but no well-defined collection identified. No significant biliary ductal dilatation. Spleen: Normal. Pancreas: Normal. Adrenal glands: Normal. Kidneys/Ureters: Unremarkable. Bowel/Mesentery: No acute findings. Colonic diverticulosis without evidence of diverticulitis. Lymph nodes: Normal. Vessels:  Atherosclerosis of the abdominal aorta without aneurysm Abdominal Wall: Normal.   Pelvis: No acute pelvic pathology status post hysterectomy Urinary Bladder: Normal. Lymph nodes:  Normal.   Bones: Spinal degenerative change with scoliosis      Impression: Impression: Cholecystostomy tube in place. Pericholecystic fluid and inflammation suggesting cholecystitis, but no well-defined collection identified. Suspected pneumonia or aspiration in the right lung base. Workstation:[id number]    IR BILIARY CATH CHECK    Result Date: 7/19/2024  Narrative: Procedure: Cholecystostomy tube check. Clinical History: History of cholecystitis with cholecystostomy catheter placed 5/28/2024. Antibiotics/medications given: None Estimated Blood Loss (mL): 0 Fluoroscopy time (minutes): 0.2 CAK Dose (mGy): 1.97 Contrast volume (mL): 10 Contrast type: Omnipaque 300 Contrast route: Via cholecystostomy A time-out was performed with all team members present and in agreement, confirming the correct patient, correct procedure, and correct side/site. Technique: The patient was placed supine and the indwelling cholecystostomy catheter was accessed sterilely. A small volume contrast was injected and fluoroscopic images obtained. The catheter was flushed and returned to external gravity drainage. A sterile dressing was applied. The patient tolerated the procedure well. Findings: Large gallbladder calculus with obstruction of the gallbladder neck and cystic duct    Impression: Impression: Cholelithiasis with cystic duct obstruction. Workstation:[id number]        ** Please Note: Dragon 360 Dictation voice to text software was used in the creation of this document. **

## 2024-07-30 NOTE — PROGRESS NOTES
"[facility]  Progress Note  Name: Jennifer Patel I  MRN: [id number]  Unit/Bed#: CRB I Date of Admission: 7/29/2024   Date of Service: 7/30/2024 I Hospital Day: 1    Assessment & Plan   * Failure to thrive in adult  Assessment & Plan  Patient presented with tachycardia/lightheadedness, along with ongoing low oral intake/progressive weight loss over the past 2 months of at least 15 pounds  Several recent hospitalizations at [facility] 5/2024 with similar picture found with acute cholecystitis now s/p percutaneous cholecystostomy as patient appears to have been felt to be too high surgical risk, additionally underwent EGD at that time reportedly without concerning findings.  Had subsequent hospitalization earlier this month at same facility with abdominal pain with nausea/vomiting found with leukocytosis and blood cultures with gram-negative bacteremia initially requiring IV antibiotics and transitioned to oral amoxicillin.  Unfortunately following discharge patient with ongoing poor appetite with minimal oral intake and worsening weakness/dizziness which prompted presentation now  With atrial fibrillation with RVR on arrival, management of this as per associated problem.  Labs otherwise without marked abnormalities compared to prior.  Family expressing concern with ongoing decline/weakness  CT abdomen/pelvis with collapsed gallbladder, alicja tube adjacent to the tip of the fundus, no obstruction, small bilateral pleural effusions, diverticulosis without colitis or diverticulitis   TSH/B12/folate acceptable  Nutrition services consult pending   Replete Mg and K, gentle IVF   Consult geriatrics, family feels she has \"given up\" and interested in possible medication to help improve appetite  Further workup as appropriate dependent on clinical course  Family expressed concern as above given ongoing decline/weakness, would like consideration for at least temporary SNF placement. "  PT/OT evaluations and CM consult to assist with disposition    Cholecystitis  Assessment & Plan  Discovered during hospitalization 5/2024 at [facility], s/p percutaneous cholecystostomy tube placement; appears patient was felt too high risk that time for surgical intervention.   CT abdomen/pelvis shows tube, no obstruction  Currently on amoxicillin following hospitalization earlier this month at [facility] for gram-negative bacteremia, continue through treatment course  Otherwise continue routine cholecystostomy care    Abnormal urinalysis  Assessment & Plan  Unclear if could be contributing to current picture  Try to add on urine cx if able  Observe off abx for now    Electrolyte abnormality  Assessment & Plan  Replete K and Mg  Monitor labs     Paroxysmal atrial fibrillation with RVR ([facility])  Assessment & Plan  ED arrival patient noted in rapid atrial fibrillation -160.  Patient complaining of dizziness/lightheadedness, admits that she had not taken medication at least for the past 1 day due to markedly decreased appetite  S/p initial 5 mg IV Lopressor with improvement in heart rates however with recurrent RVR on my evaluation.  Given additional 5 mg IV Lopressor, restart metoprolol succinate 100 mg twice daily and diltiazem 240 mg daily.    If recurrent RVR low threshold to obtain cardiology consultation  TSH slightly elevated, check FT4  Continue anticoagulation with Eliquis    Depressive disorder  Assessment & Plan  Mood appears slightly depressed per family, continue home dose sertraline  Geriatrics eval    Benign essential hypertension  Assessment & Plan  Blood pressure elevated on presentation, possibly due to missed medications as well as presentation and hospital  Continue diltiazem 240 mg daily, lisinopril 20 mg daily, and metoprolol succinate 100 mg twice daily with strict hold parameters and monitor blood pressure per protocol             Unsteadiness on feet POA due to dizziness and  weakness in the setting of inadequate PO intake a/e/b family reports patient is not getting out of bed, patient reporting dizziness and generalized weakness treated with geriatric medicine consultation, PT/OT evaluation/treatment, fall precautions, and assistance with transfers and ambulation.          VTE Pharmacologic Prophylaxis: VTE Score: 5 Moderate Risk (Score 3-4) - Pharmacological DVT Prophylaxis Ordered: apixaban (Eliquis).    Mobility:      -[facility] Goal achieved. Continue to encourage appropriate mobility.    Patient Centered Rounds: I performed bedside rounds with nursing staff today.   Discussions with Specialists or Other Care Team Provider: appreciate geriatrics     Education and Discussions with Family / Patient: Updated  (son) via phone.    Total Time Spent on Date of Encounter in care of patient: 35 mins. This time was spent on one or more of the following: performing physical exam; counseling and coordination of care; obtaining or reviewing history; documenting in the medical record; reviewing/ordering tests, medications or procedures; communicating with other healthcare professionals and discussing with patient's family/caregivers.    Current Length of Stay: 1 day(s)  Current Patient Status: Inpatient   Certification Statement: The patient will continue to require additional inpatient hospital stay due to FTT  Discharge Plan: Anticipate discharge in >72 hrs to discharge location to be determined pending rehab evaluations.    Code Status: Level 3 - DNAR and DNI    Subjective:   Pt states she is okay. Had a few bites of breakfast. Denies any pain but states she just has no appetite. Family feels she has given up.     Objective:     Vitals:   Temp (24hrs), Av.7 °F (37.1 °C), Min:98.7 °F (37.1 °C), Max:98.7 °F (37.1 °C)    Temp:  [98.7 °F (37.1 °C)] 98.7 °F (37.1 °C)  HR:  [] 98  Resp:  [18-22] 20  BP: (128-173)/() 166/112  SpO2:  [95 %-98 %] 96 %  There is no height or  weight on file to calculate BMI.     Input and Output Summary (last 24 hours):     Intake/Output Summary (Last 24 hours) at 7/30/2024 0927  Last data filed at 7/29/2024 1900  Gross per 24 hour   Intake 1000 ml   Output --   Net 1000 ml       Physical Exam:   Physical Exam  Vitals and nursing note reviewed.   Constitutional:       General: She is not in acute distress.     Appearance: She is ill-appearing.      Comments: On RA    Cardiovascular:      Rate and Rhythm: Tachycardia present. Rhythm irregular.   Pulmonary:      Effort: No respiratory distress.   Abdominal:      General: There is no distension.      Palpations: Abdomen is soft.      Tenderness: There is no abdominal tenderness.      Comments: R sided alicja tube in place    Skin:     Coloration: Skin is pale.   Neurological:      Mental Status: She is alert and oriented to person, place, and time.   Psychiatric:         Mood and Affect: Mood normal.          Additional Data:     Labs:  Results from last 7 days   Lab Units 07/30/24  0418 07/29/24  1755   WBC Thousand/uL 5.40 6.11   HEMOGLOBIN g/dL 10.4* 10.9*   HEMATOCRIT % 31.4* 33.0*   PLATELETS Thousands/uL 209 225   SEGS PCT %  --  65   LYMPHO PCT %  --  25   MONO PCT %  --  8   EOS PCT %  --  1     Results from last 7 days   Lab Units 07/30/24  0418 07/29/24  1857   SODIUM mmol/L 138 135   POTASSIUM mmol/L 3.2* 3.8   CHLORIDE mmol/L 102 100   CO2 mmol/L 27 23   BUN mg/dL 12 16   CREATININE mg/dL 0.60 0.55*   ANION GAP mmol/L 9 12   CALCIUM mg/dL 7.7* 8.2*   ALBUMIN g/dL  --  2.7*   TOTAL BILIRUBIN mg/dL  --  0.41   ALK PHOS U/L  --  35   ALT U/L  --  8   AST U/L  --  15   GLUCOSE RANDOM mg/dL 102 100                 Results from last 7 days   Lab Units 07/29/24  1758   LACTIC ACID mmol/L 0.9       Lines/Drains:  Invasive Devices       Peripheral Intravenous Line  Duration             Peripheral IV 07/29/24 Left Antecubital <1 day                      Telemetry:  Telemetry Orders (From admission, onward)                24 Hour Telemetry Monitoring  Continuous x 24 Hours (Telem)        Question:  Reason for 24 Hour Telemetry  Answer:  Arrhythmias requiring acute medical intervention / PPM or ICD malfunction                     Telemetry Reviewed: Atrial fibrillation. HR averaging one teens  Indication for Continued Telemetry Use: Arrthymias requiring medical therapy             Imaging: Reviewed radiology reports from this admission including: abdominal/pelvic CT    Recent Cultures (last 7 days):         Last 24 Hours Medication List:   Current Facility-Administered Medications   Medication Dose Route Frequency Provider Last Rate    acetaminophen  650 mg Oral Q6H PRN Abraham Holley, DO      amoxicillin  1,000 mg Oral Q8H GIANNA Abraham Holley, DO      apixaban  2.5 mg Oral BID Abraham Holley, DO      atorvastatin  40 mg Oral Daily With Dinner Abraham Holley, DO      calcium carbonate-vitamin D  1 tablet Oral BID With Meals Abraham Holley, DO      diltiazem  240 mg Oral Daily Abraham Holley, DO      fluticasone  2 spray Each Nare Daily Abraham Holley, DO      levothyroxine  88 mcg Oral Early Morning Abraham Holley, DO      lisinopril  20 mg Oral Daily Abraham Holley, DO      magnesium sulfate  4 g Intravenous Once Abraham Holley, DO 4 g (07/30/24 0812)    metoprolol  5 mg Intravenous Q6H PRN Luly Dyer PA-C      metoprolol succinate  100 mg Oral BID Abraham Holley, DO      multi-electrolyte  100 mL/hr Intravenous Continuous Luly Dyer PA-C 100 mL/hr (07/29/24 2341)    ondansetron  4 mg Intravenous Q6H PRN Abraham Holley, DO      sertraline  50 mg Oral Daily Abraham Holley, DO          Today, Patient Was Seen By: Luly Dyer PA-C    **Please Note: This note may have been constructed using a voice recognition system.**

## 2024-07-30 NOTE — ED NOTES
Received report from off going RN  Care Assumed at this time  Pt still awaiting inpatient bed     Sonja Martinez, RN  07/30/24 0659

## 2024-07-30 NOTE — CASE MANAGEMENT
Case Management Assessment & Discharge Planning Note    Patient name Jennifer Patel  [facility] MRN [id number]  : 1935 Date 2024       Current Admission Date: 2024  Current Admission Diagnosis:Failure to thrive in adult   Patient Active Problem List    Diagnosis Date Noted Date Diagnosed    Electrolyte abnormality 2024     Abnormal urinalysis 2024     Severe protein-calorie malnutrition (HCC) 2024     Unsteadiness on feet 2024     Stage 3 chronic kidney disease (HCC) 2024     Failure to thrive in adult 2024     Cholecystitis 2024     Paroxysmal atrial fibrillation with RVR (HCC) 2023     Coronary artery disease involving native coronary artery of native heart without angina pectoris 10/19/2018     Depressive disorder 2008     Benign essential hypertension 2007       LOS (days): 1  Geometric Mean LOS (GMLOS) (days): 3.6  Days to GMLOS:2.8     OBJECTIVE:    Risk of Unplanned Readmission Score: 14.94         Current admission status: Inpatient       Preferred Pharmacy:   [facility] [place], [place] - [address]  [address] [facility] [address]  Phone: [phone] Fax: [phone]    Primary Care Provider: Curtis Jackson MD    Primary Insurance: MEDICARE  Secondary Insurance: [employer]    ASSESSMENT:  Active Health Care Proxies    There are no active Health Care Proxies on file.       Patient Information  Admitted from:: Home  Mental Status: Confused  During Assessment patient was accompanied by: Not accompanied during assessment  Assessment information provided by:: Son  Primary Caregiver: Family  Home entry access options. Select all that apply.: Stairs  Number of steps to enter home.: 2  Type of Current Residence: [facility]  Living Arrangements: Lives w/ Spouse/significant other, Lives w/ Son  Is patient a ?: No    Activities of Daily Living Prior to Admission  Functional  Status: Independent  Completes ADLs independently?: Yes  Ambulates independently?: Yes  Does patient use assisted devices?: Yes  Assisted Devices (DME) used: Wheelchair, Walker, Bedside Commode  Does patient have a history of Outpatient Therapy (PT/OT)?: No  Does the patient have a history of Short-Term Rehab?: No  Does patient have a history of HHC?: Yes ( home health)  Does patient currently have HHC?: Yes    Current Home Health Care  Type of Current Home Care Services: Home PT  Current Home Health Agency:: Other (please enter name in comment) ()    Patient Information Continued  Income Source: Pension/group home  Does patient have prescription coverage?: Yes  Does patient receive dialysis treatments?: No  Does patient have a history of substance abuse?: No  Does patient have a history of Mental Health Diagnosis?: No    Means of Transportation  Means of Transport to Appts:: Family transport      Social Determinants of Health (SDOH)      Flowsheet Row Most Recent Value   Housing Stability    In the last 12 months, was there a time when you were not able to pay the mortgage or rent on time? N   In the past 12 months, how many times have you moved where you were living? 0   At any time in the past 12 months, were you homeless or living in a shelter (including now)? N   Transportation Needs    In the past 12 months, has lack of transportation kept you from medical appointments or from getting medications? no   In the past 12 months, has lack of transportation kept you from meetings, work, or from getting things needed for daily living? No   Food Insecurity    Within the past 12 months, you worried that your food would run out before you got the money to buy more. Never true   Within the past 12 months, the food you bought just didn't last and you didn't have money to get more. Never true   Utilities    In the past 12 months has the electric, gas, oil, or water company threatened to shut off services in your home?  No            DISCHARGE DETAILS:    Discharge planning discussed with:: Juan Manuel Fonseca  Freedom of Choice: Yes  Comments - Freedom of Choice: Discussed FOC  CM contacted family/caregiver?: Yes    Other Referral/Resources/Interventions Provided:  Referral Comments: Son states he would like referral to [facility], as patient's primary MD oversees the facility, entered in [facility]    CM reviewed d/c planning process including the following: identifying help at home, patient preference for d/c planning needs, Discharge Lounge, [facility] Meds to Bed program, availability of treatment team to discuss questions or concerns patient and/or family may have regarding understanding medications and recognizing signs and symptoms once discharged.  CM also encouraged patient to follow up with all recommended appointments after discharge. Patient advised of importance for patient and family to participate in managing patient’s medical well being.    This CM introduced self and role.  Patient lives with spouse and son in ranch style home, [address].  Son assists with all ADLS, patient can feed self.  Has commode, walker and WC at home (son has been transferring patient to , as she has not been able to walk lately).  1 fall in past 6 months.

## 2024-07-30 NOTE — ASSESSMENT & PLAN NOTE
ED arrival patient noted in rapid atrial fibrillation -160.  Patient complaining of dizziness/lightheadedness, admits that she had not taken medication at least for the past 1 day due to markedly decreased appetite  S/p initial 5 mg IV Lopressor with improvement in heart rates however with recurrent RVR on my evaluation.  Given additional 5 mg IV Lopressor, restart metoprolol succinate 100 mg twice daily and diltiazem 240 mg daily.    If recurrent RVR low threshold to obtain cardiology consultation  TSH slightly elevated, check FT4  Continue anticoagulation with Eliquis

## 2024-07-30 NOTE — ASSESSMENT & PLAN NOTE
Unclear if could be contributing to current picture  Try to add on urine cx if able  Observe off abx for now

## 2024-07-30 NOTE — ASSESSMENT & PLAN NOTE
[facility] s/p percutaneous cholecystostomy tube placement; appears patient was felt too high risk that time for surgical intervention.   Will get CT abdomen/pelvis to see if there is intra-abdominal pathology to explain primary problem  Currently on amoxicillin following [facility] for gram-negative bacteremia, continue through treatment course  Otherwise continue routine cholecystostomy care

## 2024-07-30 NOTE — ASSESSMENT & PLAN NOTE
Blood pressure elevated on presentation, possibly due to missed medications as well as presentation and hospital  Resume diltiazem 240 mg daily, lisinopril 20 mg daily, and metoprolol succinate 100 mg twice daily with strict hold parameters and monitor blood pressure per protocol

## 2024-07-30 NOTE — ASSESSMENT & PLAN NOTE
Malnutrition Findings:   Adult Malnutrition type: Chronic illness  Adult Degree of Malnutrition: Other severe protein calorie malnutrition  Malnutrition Characteristics: Fat loss, Muscle loss, Inadequate energy, Weight loss                  360 Statement: Chronic severe pro, ruiz malnutrition d/t condition, reported no appetite as evidence by <75% energy intake > 1 month, significant weight loss,  11/6/23 110lbs, 4/7/24 107lbs, 6/1/24 119lbs, 7/30/24 95lbs 12lbs/11.2% wt. loss x 3 months, 24lbs/20% wt. loss x 1 month, moderate to severe signs of muscle and fat loss at temples, orbitals, buccal region, BMI 16.9, currently on regular diet, on IVF, consider appetite stimulant, will add oral nutrition supplements (Ensure Plus BID, magic cup daily) to help maximize pro, ruiz intake, monitor for food preferences. If PO doesn't improve will need to consider nutrition support depending on plan of care.    BMI Findings:  Adult BMI Classifications: Underweight < 18.5        Body mass index is 16.93 kg/m².

## 2024-07-30 NOTE — MALNUTRITION/BMI
This medical record reflects one or more clinical indicators suggestive of malnutrition and/or morbid obesity.    Malnutrition Findings:   Adult Malnutrition type: Chronic illness  Adult Degree of Malnutrition: Other severe protein calorie malnutrition  Malnutrition Characteristics: Fat loss, Muscle loss, Inadequate energy, Weight loss                  360 Statement: Chronic severe pro, ruiz malnutrition d/t condition, reported no appetite as evidence by <75% energy intake > 1 month, significant weight loss,  11/6/23 110lbs, 4/7/24 107lbs, 6/1/24 119lbs, 7/30/24 95lbs 12lbs/11.2% wt. loss x 3 months, 24lbs/20% wt. loss x 1 month, moderate to severe signs of muscle and fat loss at temples, orbitals, buccal region, BMI 16.9, currently on regular diet, on IVF, consider appetite stimulant, will add oral nutrition supplements (Ensure Plus BID, magic cup daily) to help maximize pro, ruiz intake, monitor for food preferences. If PO doesn't improve will need to consider nutrition support depending on plan of care.    BMI Findings:  Adult BMI Classifications: Underweight < 18.5        Body mass index is 16.93 kg/m².     See Nutrition note dated 7/30/24 for additional details.  Completed nutrition assessment is viewable in the nutrition documentation.

## 2024-07-30 NOTE — CONSULTS
Consultation - Geriatrics   Jennifer Patel 89 y.o. female MRN: [id number]  Unit/Bed#: [facility] 520-01 Encounter: [id number]      Assessment/Plan    Failure to Thrive  Decreased appetite over past 2 months  Weight loss of 25 pounds documented from hospitalizations from 5/25 - 7/21  Diagnosed with cholecystitis during [facility] hospitalization in May 2024  Perc cholecystostomy placed 5/28  Has had visiting nurses for OT/PT since then at home  No surgical intervention required at that time  During most recent hospital admission at [facility], 7/21, +Ecoli from Cholecystostomy  No EGD performed, still no surgical intervention required  UGI completed and saw esophageal dysmotility  Not started on any medications for nausea/decreased appetite at discharge  TSH 7/29/24: 5.631   Vit B12 7/30/24: 788  Mag 7/30/24: 1.3  Upon exam today, patient is quiet, soft spoken, endorses decreased appetite  History of depression, continue Sertraline 50 mg once daily  Recommend ordering Mirtazapine 7.5 mg HS to help with appetite  Monitor for sedation, lethargy, daytime somnolence    Decreased Appetite  Diagnosed with cholecystitis in May at [facility], percutaneous cholecystostomy placed  +Ecoli, leukocytosis during admission 7/21 at [facility]  Treated with antibiotics, ID consulted during admission  Questionable whether tube was clamped PTA   Currently draining  CT abd 7/29: collapsed gallbladder, alicja tube in place, no biliary obstruction; diverticulosis w/o evidence of diverticulitis or colitis; small bilateral pleural effusions  Denies nausea, vomiting upon exam  Recommendation above for Mirtazapine at bedtime to help stimulate appetite  Encourage adequate hydration/nutrition  Continue protein supplementation    Frailty  Clinical Frail Scale: 6- Moderately Frail  Previously independent with ADLs, needs assistance with IADLs  Lives with son and  on one floor home  Son drives  and patient to appointments, cooks, clean, transport, bills,  medications  States she does not use assistive device at home  Has barely been getting OOB over past couple weeks    Severe Protein Malnutrition  Secondary to decrease in appetite, weight loss of 15-20 pounds over past couple months  Labs from 7/29: Total Protein 5.0; Albumin: 2.7  Risk factors: depression, poor appetite, loss of executive function ie shopping, cooking  Dietary consult being completed  Continue protien supplementation, Ensure breakfast and dinner, Magic cup at lunch  BMI of 16.93    Risk for Delirium  Patient at risk for delirium secondary to age, poly pharmacy, hospitalization, infection, electrolyte imbalance, immobility  Identify and treat underlying cause  Provide frequent redirection, reorientation, distraction techniques  Avoid deliriogenic medications such as tramadol, benzodiazepines, anticholinergics,  Benadryl  Treat pain, See geriatric pain protocol  Monitor for constipation and urinary retention  Encourage early and frequent moblization, OOB  Encourage Hydration/ Nutrition  Implement sleep hygiene, limit night time interuptions, group activities  Avoid physical restraints  Use chemical restraint only when other efforts have failed, recommend zyprexa 2.5mg IM q8h prn  Monitor Qtc, if greater than 500 do not use antipsychotic medication  Most recent EKG from 7/29 QTC: 488  If QTc greater than 460, monitor and replete and deficiency of  K and Mg, recheck EKG    Paroxysmal Atrial Fibrillation  Presented to ED for dizziness, tachycardia  Had not taken medications d/t decreased appetite  Given IV Lopressor, Restarted on PTA medications, Toprol  mg BID and Diltiazem 240 mg once daily  No complaints of CP, dizziness,lightheadedness, palpitations upon exam today  Continue to monitor by primary care team        History of Present Illness   Physician Requesting Consult: Felix Jessica MD  Reason for Consult / Principal Problem: Lack of appetite, Failure to thrive  Hx and PE limited by:  N/A  HPI: Jennifer Patel is a 89 y.o. year old female who presents with failure to thrive in adult as well as atrial fibrillation with RVR. She has a history of MDD, atrial fibrillation, hypothyroidism, diverticular disease, hysterectomy.     She presents yesterday to the ED after recent hospitalization at [facility]. Her family stated she has had decreased appetite and was not getting out of bed. She does not wear hearing aids. She does not wear dentures. She does not wear glasses. She states she does not use an assistive device at home. She does not drive. She performs all ADLs, and her son Juan Manuel, performs all IADLs. When asked what she enjoys to do at home she states reading, watching tv, talking with her  and son.     Upon exam today, she is laying in bed. She is alert and oriented x 4, currently and at baseline. She is soft spoken. She states she does have decreased appetite, but denies n/v/c/d. She does not complain of CP, dizziness/lightheadedness, SOB, headaches. She states she is sleeping well. Her cholecystostomy is draining. She does not complain of any pain.     Inpatient consult to Gerontology  Consult performed by: EMMA Toro  Consult ordered by: Luly Dyer PA-C        Additional history obtained from: Chart review and patient evaluation.  I personally spoke with [facility] pharmacy to confirm home medications      Review of Systems    Historical Information   Past Medical History:   Diagnosis Date    Disease of thyroid gland     Hyperlipidemia     Hypertension     MI (myocardial infarction) (HCC) 04/2018    Tubal pregnancy      Past Surgical History:   Procedure Laterality Date    HYSTERECTOMY       Social History   Social History     Substance and Sexual Activity   Alcohol Use No     Social History     Substance and Sexual Activity   Drug Use No     Social History     Tobacco Use   Smoking Status Never   Smokeless Tobacco Never         Family History: No family history on  "file.    Meds/Allergies   Current meds:   Current Facility-Administered Medications   Medication Dose Route Frequency    acetaminophen (TYLENOL) tablet 650 mg  650 mg Oral Q6H PRN    amoxicillin (AMOXIL) capsule 1,000 mg  1,000 mg Oral Q8H GIANNA    apixaban (ELIQUIS) tablet 2.5 mg  2.5 mg Oral BID    atorvastatin (LIPITOR) tablet 40 mg  40 mg Oral Daily With Dinner    calcium carbonate-vitamin D 500 mg-5 mcg tablet 1 tablet  1 tablet Oral BID With Meals    diltiazem (CARDIZEM CD) 24 hr capsule 240 mg  240 mg Oral Daily    fluticasone (FLONASE) 50 mcg/act nasal spray 2 spray  2 spray Each Nare Daily    levothyroxine tablet 88 mcg  88 mcg Oral Early Morning    lisinopril (ZESTRIL) tablet 20 mg  20 mg Oral Daily    metoprolol (LOPRESSOR) injection 5 mg  5 mg Intravenous Q6H PRN    metoprolol succinate (TOPROL-XL) 24 hr tablet 100 mg  100 mg Oral BID    multi-electrolyte (PLASMALYTE-A/ISOLYTE-S PH 7.4) IV solution  100 mL/hr Intravenous Continuous    ondansetron (ZOFRAN) injection 4 mg  4 mg Intravenous Q6H PRN    sertraline (ZOLOFT) tablet 50 mg  50 mg Oral Daily      Current PTA meds:  Confirmed with [facility]    Sertraline 50 mg PO once daily  Atorvastatin 40 mg PO once daily  Levothryoxine 88 mcg PO once daily  Lisinopril 30 mg PO once daily  Eliquis 2.5 mg PO BID  Ibandronate 150 mg PO once month    Per [facility] Discharge Medications prescribed at last hospital admission:    Diltiazem 240 mg PO once daily   Metoprolol  mg BID   Amoxicillin 1000 mg TID    OTC meds:   Calcium Carb one tablet PO once daily  Nasonex nasal spray 2 sprays into each nostril daily    No Known Allergies    Objective   Vitals: Blood pressure 149/90, pulse 104, temperature (!) 97.4 °F (36.3 °C), temperature source Axillary, resp. rate 14, height 5' 3\" (1.6 m), weight 43.4 kg (95 lb 9.6 oz), SpO2 96%.,Body mass index is 16.93 kg/m².      Physical Exam    Lab Results:   I have personally reviewed pertinent lab results " including the following:    Lab Results:   Results from last 7 days   Lab Units 07/30/24  0418   WBC Thousand/uL 5.40   HEMOGLOBIN g/dL 10.4*   HEMATOCRIT % 31.4*   PLATELETS Thousands/uL 209        Results from last 7 days   Lab Units 07/30/24 0418 07/29/24  1857   POTASSIUM mmol/L 3.2* 3.8   CHLORIDE mmol/L 102 100   CO2 mmol/L 27 23   BUN mg/dL 12 16   CREATININE mg/dL 0.60 0.55*   CALCIUM mg/dL 7.7* 8.2*   ALK PHOS U/L  --  35   ALT U/L  --  8   AST U/L  --  15       Imaging Studies: I have personally reviewed pertinent films in PACS    Imaging Studies: I have personally reviewed pertinent reports.   and I have personally reviewed pertinent films in PACS  EKG, Pathology, and Other Studies: I have personally reviewed pertinent reports.   and I have personally reviewed pertinent films in PACS  VTE Prophylaxis: Sequential compression device (Venodyne)     Code Status: Level 3 - DNAR and DNI

## 2024-07-30 NOTE — ASSESSMENT & PLAN NOTE
ED arrival patient noted in rapid atrial fibrillation -160.  Patient complaining of dizziness/lightheadedness, admits that she had not taken medication at least for the past 1 day due to markedly decreased appetite  S/p initial 5 mg IV Lopressor with improvement in heart rates however with recurrent RVR on my evaluation.  Give additional 5 mg IV Lopressor now, restart metoprolol succinate 100 mg twice daily and diltiazem 240 mg daily.  If recurrent RVR low threshold to obtain cardiology consultation  Check TSH, monitor on telemetry  Continue anticoagulation with Eliquis

## 2024-07-30 NOTE — ASSESSMENT & PLAN NOTE
Blood pressure elevated on presentation, possibly due to missed medications as well as presentation and hospital  Continue diltiazem 240 mg daily, lisinopril 20 mg daily, and metoprolol succinate 100 mg twice daily with strict hold parameters and monitor blood pressure per protocol

## 2024-07-31 LAB
ALBUMIN SERPL BCG-MCNC: 2.7 G/DL (ref 3.5–5)
ALP SERPL-CCNC: 37 U/L (ref 34–104)
ALT SERPL W P-5'-P-CCNC: 7 U/L (ref 7–52)
ANION GAP SERPL CALCULATED.3IONS-SCNC: 7 MMOL/L (ref 4–13)
AST SERPL W P-5'-P-CCNC: 8 U/L (ref 13–39)
BASOPHILS # BLD AUTO: 0.05 THOUSANDS/ÂΜL (ref 0–0.1)
BASOPHILS NFR BLD AUTO: 1 % (ref 0–1)
BILIRUB SERPL-MCNC: 0.37 MG/DL (ref 0.2–1)
BUN SERPL-MCNC: 12 MG/DL (ref 5–25)
CALCIUM ALBUM COR SERPL-MCNC: 9.2 MG/DL (ref 8.3–10.1)
CALCIUM SERPL-MCNC: 8.2 MG/DL (ref 8.4–10.2)
CHLORIDE SERPL-SCNC: 103 MMOL/L (ref 96–108)
CO2 SERPL-SCNC: 29 MMOL/L (ref 21–32)
CREAT SERPL-MCNC: 0.46 MG/DL (ref 0.6–1.3)
EOSINOPHIL # BLD AUTO: 0.23 THOUSAND/ÂΜL (ref 0–0.61)
EOSINOPHIL NFR BLD AUTO: 3 % (ref 0–6)
ERYTHROCYTE [DISTWIDTH] IN BLOOD BY AUTOMATED COUNT: 15.2 % (ref 11.6–15.1)
GFR SERPL CREATININE-BSD FRML MDRD: 89 ML/MIN/1.73SQ M
GLUCOSE SERPL-MCNC: 123 MG/DL (ref 65–140)
HCT VFR BLD AUTO: 36.6 % (ref 34.8–46.1)
HGB BLD-MCNC: 12 G/DL (ref 11.5–15.4)
IMM GRANULOCYTES # BLD AUTO: 0.06 THOUSAND/UL (ref 0–0.2)
IMM GRANULOCYTES NFR BLD AUTO: 1 % (ref 0–2)
LYMPHOCYTES # BLD AUTO: 1.6 THOUSANDS/ÂΜL (ref 0.6–4.47)
LYMPHOCYTES NFR BLD AUTO: 20 % (ref 14–44)
MAGNESIUM SERPL-MCNC: 2 MG/DL (ref 1.9–2.7)
MCH RBC QN AUTO: 30.8 PG (ref 26.8–34.3)
MCHC RBC AUTO-ENTMCNC: 32.8 G/DL (ref 31.4–37.4)
MCV RBC AUTO: 94 FL (ref 82–98)
MONOCYTES # BLD AUTO: 0.46 THOUSAND/ÂΜL (ref 0.17–1.22)
MONOCYTES NFR BLD AUTO: 6 % (ref 4–12)
NEUTROPHILS # BLD AUTO: 5.66 THOUSANDS/ÂΜL (ref 1.85–7.62)
NEUTS SEG NFR BLD AUTO: 69 % (ref 43–75)
NRBC BLD AUTO-RTO: 0 /100 WBCS
PLATELET # BLD AUTO: 232 THOUSANDS/UL (ref 149–390)
PMV BLD AUTO: 10.6 FL (ref 8.9–12.7)
POTASSIUM SERPL-SCNC: 4 MMOL/L (ref 3.5–5.3)
PROT SERPL-MCNC: 4.8 G/DL (ref 6.4–8.4)
RBC # BLD AUTO: 3.9 MILLION/UL (ref 3.81–5.12)
SODIUM SERPL-SCNC: 139 MMOL/L (ref 135–147)
WBC # BLD AUTO: 8.06 THOUSAND/UL (ref 4.31–10.16)

## 2024-07-31 PROCEDURE — 80053 COMPREHEN METABOLIC PANEL: CPT | Performed by: INTERNAL MEDICINE

## 2024-07-31 PROCEDURE — 97167 OT EVAL HIGH COMPLEX 60 MIN: CPT

## 2024-07-31 PROCEDURE — 93005 ELECTROCARDIOGRAM TRACING: CPT

## 2024-07-31 PROCEDURE — 85025 COMPLETE CBC W/AUTO DIFF WBC: CPT | Performed by: INTERNAL MEDICINE

## 2024-07-31 PROCEDURE — 99232 SBSQ HOSP IP/OBS MODERATE 35: CPT | Performed by: INTERNAL MEDICINE

## 2024-07-31 PROCEDURE — 99233 SBSQ HOSP IP/OBS HIGH 50: CPT

## 2024-07-31 PROCEDURE — 83735 ASSAY OF MAGNESIUM: CPT | Performed by: INTERNAL MEDICINE

## 2024-07-31 PROCEDURE — 97163 PT EVAL HIGH COMPLEX 45 MIN: CPT

## 2024-07-31 RX ORDER — LANOLIN ALCOHOL/MO/W.PET/CERES
400 CREAM (GRAM) TOPICAL 2 TIMES DAILY
Status: DISCONTINUED | OUTPATIENT
Start: 2024-07-31 | End: 2024-08-02 | Stop reason: HOSPADM

## 2024-07-31 RX ORDER — MIRTAZAPINE 15 MG/1
15 TABLET, FILM COATED ORAL
Status: DISCONTINUED | OUTPATIENT
Start: 2024-07-31 | End: 2024-08-02 | Stop reason: HOSPADM

## 2024-07-31 RX ADMIN — ONDANSETRON 4 MG: 2 INJECTION INTRAMUSCULAR; INTRAVENOUS at 08:18

## 2024-07-31 RX ADMIN — MIRTAZAPINE 15 MG: 15 TABLET, FILM COATED ORAL at 21:41

## 2024-07-31 RX ADMIN — LEVOTHYROXINE SODIUM 88 MCG: 88 TABLET ORAL at 05:20

## 2024-07-31 RX ADMIN — APIXABAN 2.5 MG: 2.5 TABLET, FILM COATED ORAL at 08:21

## 2024-07-31 RX ADMIN — Medication 1 TABLET: at 15:40

## 2024-07-31 RX ADMIN — LISINOPRIL 20 MG: 20 TABLET ORAL at 08:20

## 2024-07-31 RX ADMIN — ATORVASTATIN CALCIUM 40 MG: 40 TABLET, FILM COATED ORAL at 15:40

## 2024-07-31 RX ADMIN — AMOXICILLIN 1000 MG: 500 CAPSULE ORAL at 15:39

## 2024-07-31 RX ADMIN — SERTRALINE HYDROCHLORIDE 50 MG: 50 TABLET ORAL at 08:21

## 2024-07-31 RX ADMIN — AMOXICILLIN 1000 MG: 500 CAPSULE ORAL at 05:20

## 2024-07-31 RX ADMIN — Medication 1 TABLET: at 08:21

## 2024-07-31 RX ADMIN — DILTIAZEM HYDROCHLORIDE 240 MG: 240 CAPSULE, COATED, EXTENDED RELEASE ORAL at 08:19

## 2024-07-31 RX ADMIN — MAGNESIUM OXIDE TAB 400 MG (241.3 MG ELEMENTAL MG) 400 MG: 400 (241.3 MG) TAB at 17:38

## 2024-07-31 RX ADMIN — AMOXICILLIN 1000 MG: 500 CAPSULE ORAL at 21:41

## 2024-07-31 RX ADMIN — APIXABAN 2.5 MG: 2.5 TABLET, FILM COATED ORAL at 17:38

## 2024-07-31 RX ADMIN — METOPROLOL SUCCINATE 100 MG: 100 TABLET, EXTENDED RELEASE ORAL at 08:20

## 2024-07-31 NOTE — PLAN OF CARE
Problem: PHYSICAL THERAPY ADULT  Goal: Performs mobility at highest level of function for planned discharge setting.  See evaluation for individualized goals.  Description: Treatment/Interventions: Functional transfer training, LE strengthening/ROM, Therapeutic exercise, Endurance training, Patient/family training, Equipment eval/education, Bed mobility, Gait training, Spoke to nursing          See flowsheet documentation for full assessment, interventions and recommendations.  Note: Prognosis: Fair  Problem List: Decreased strength, Decreased range of motion, Decreased endurance, Impaired balance, Decreased mobility, Decreased safety awareness  Assessment: Pt seen for high complexity PT evaluation due to decrease in functional mobility status compared to baseline.  Pt with active PT eval/treat orders at this time.  Pt is a 89 y.o. F who presented to [facility] with abdominal pain and nausea on 7/29/24, found to have gram negative bacteremia.  Primary dx is failure to thrive in an adult.  Pt  has a past medical history of Disease of thyroid gland, Hyperlipidemia, Hypertension, MI (myocardial infarction) ([facility]) (04/2018), and Tubal pregnancy.  Pt resides with spouse and son in 1SH with 1STE.  Pt presents with decreased strength, balance, endurance that contribute to limitations in bed mobility, functional transfers, functional mobility.  Pt requires Min A x 2 for STS and short distance mobility at this time.  Pt left upright in bedside chair with chair alarm intact and with all needs in reach.  Pt will benefit from skilled therapy in order to address current impairments and functional limitations. PT to follow pt and recommending level II.  The patient's AM-PAC Basic Mobility Inpatient Short Form Raw Score is 9. A Raw score of less than or equal to 16 suggests the patient may benefit from discharge to post-acute rehabilitation services. Please also refer to the recommendation of the Physical Therapist for  safe discharge planning.  Barriers to Discharge: Inaccessible home environment, Decreased caregiver support     Rehab Resource Intensity Level, PT: II (Moderate Resource Intensity)    See flowsheet documentation for full assessment.

## 2024-07-31 NOTE — PROGRESS NOTES
[facility]  Progress Note  Name: Jennifer Patel I  MRN: [id number]  Unit/Bed#: [facility] 520-01 I Date of Admission: 7/29/2024   Date of Service: 7/31/2024 I Hospital Day: 2    Assessment & Plan   Unsteadiness on feet  Assessment & Plan  Unsteadiness on feet POA due to dizziness and weakness in the setting of inadequate PO intake a/e/b family reports patient is not getting out of bed, patient reporting dizziness and generalized weakness treated with geriatric medicine consultation, PT/OT evaluation/treatment, fall precautions, and assistance with transfers and ambulation.     Severe protein-calorie malnutrition (HCC)  Assessment & Plan  Malnutrition Findings:   Adult Malnutrition type: Chronic illness  Adult Degree of Malnutrition: Other severe protein calorie malnutrition  Malnutrition Characteristics: Fat loss, Muscle loss, Inadequate energy, Weight loss                  360 Statement: Chronic severe pro, ruiz malnutrition d/t condition, reported no appetite as evidence by <75% energy intake > 1 month, significant weight loss,  11/6/23 110lbs, 4/7/24 107lbs, 6/1/24 119lbs, 7/30/24 95lbs 12lbs/11.2% wt. loss x 3 months, 24lbs/20% wt. loss x 1 month, moderate to severe signs of muscle and fat loss at temples, orbitals, buccal region, BMI 16.9, currently on regular diet, on IVF, consider appetite stimulant, will add oral nutrition supplements (Ensure Plus BID, magic cup daily) to help maximize pro, ruiz intake, monitor for food preferences. If PO doesn't improve will need to consider nutrition support depending on plan of care.    BMI Findings:  Adult BMI Classifications: Underweight < 18.5        Body mass index is 16.93 kg/m².       Electrolyte abnormality  Assessment & Plan  Replete K and Mg  Monitor labs     Cholecystitis  Assessment & Plan  Discovered during hospitalization 5/2024 at [facility], s/p percutaneous cholecystostomy tube placement; appears patient was felt too  high risk that time for surgical intervention.   CT abdomen/pelvis shows tube, no obstruction  Currently on amoxicillin following hospitalization earlier this month at [facility] for gram-negative bacteremia, continue through treatment course  Otherwise continue routine cholecystostomy care    Paroxysmal atrial fibrillation with RVR ([facility])  Assessment & Plan  ED arrival patient noted in rapid atrial fibrillation -160.  Patient complaining of dizziness/lightheadedness, admits that she had not taken medication at least for the past 1 day due to markedly decreased appetite  S/p initial 5 mg IV Lopressor with improvement in heart rates however with recurrent RVR on my evaluation.  Given additional 5 mg IV Lopressor, restart metoprolol succinate 100 mg twice daily and diltiazem 240 mg daily.    If recurrent RVR low threshold to obtain cardiology consultation  TSH slightly elevated, check FT4  Continue anticoagulation with Eliquis    Depressive disorder  Assessment & Plan  Mood appears slightly depressed per family, continue home dose sertraline  Geriatrics eval    Benign essential hypertension  Assessment & Plan  Blood pressure elevated on presentation, possibly due to missed medications as well as presentation and hospital  Continue diltiazem 240 mg daily, lisinopril 20 mg daily, and metoprolol succinate 100 mg twice daily with strict hold parameters and monitor blood pressure per protocol    * Failure to thrive in adult  Assessment & Plan  Patient presented with tachycardia/lightheadedness, along with ongoing low oral intake/progressive weight loss over the past 2 months of at least 15 pounds  Several recent hospitalizations at [facility] 5/2024 with similar picture found with acute cholecystitis now s/p percutaneous cholecystostomy as patient appears to have been felt to be too high surgical risk, additionally underwent EGD at that time reportedly without concerning findings.  Had subsequent  "hospitalization earlier this month at same facility with abdominal pain with nausea/vomiting found with leukocytosis and blood cultures with gram-negative bacteremia initially requiring IV antibiotics and transitioned to oral amoxicillin.  Unfortunately following discharge patient with ongoing poor appetite with minimal oral intake and worsening weakness/dizziness which prompted presentation now  With atrial fibrillation with RVR on arrival, management of this as per associated problem.  Labs otherwise without marked abnormalities compared to prior.  Family expressing concern with ongoing decline/weakness  CT abdomen/pelvis with collapsed gallbladder, alicja tube adjacent to the tip of the fundus, no obstruction, small bilateral pleural effusions, diverticulosis without colitis or diverticulitis   TSH/B12/folate acceptable  Nutrition services consult pending   Replete Mg and K, gentle IVF   Consult geriatrics, family feels she has \"given up\" and interested in possible medication to help improve appetite  Further workup as appropriate dependent on clinical course  Family expressed concern as above given ongoing decline/weakness, would like consideration for at least temporary SNF placement.  PT/OT evaluations and CM consult to assist with disposition               VTE Pharmacologic Prophylaxis: VTE Score: 5 Moderate Risk (Score 3-4) - Pharmacological DVT Prophylaxis Ordered: apixaban (Eliquis).    Mobility:   Basic Mobility Inpatient Raw Score: 9  -M Goal: 3: Sit at edge of bed  JH-HLM Achieved: 5: Stand (1 or more minutes)  JH-HLM Goal achieved. Continue to encourage appropriate mobility.    Patient Centered Rounds: I performed bedside rounds with nursing staff today.   Discussions with Specialists or Other Care Team Provider:     Education and Discussions with Family / Patient: Attempted to update  (son) via phone. Unable to contact.    Total Time Spent on Date of Encounter in care of patient:  " mins. This time was spent on one or more of the following: performing physical exam; counseling and coordination of care; obtaining or reviewing history; documenting in the medical record; reviewing/ordering tests, medications or procedures; communicating with other healthcare professionals and discussing with patient's family/caregivers.    Current Length of Stay: 2 day(s)  Current Patient Status: Inpatient   Certification Statement: The patient will continue to require additional inpatient hospital stay due to placement  Discharge Plan: Anticipate discharge in 48 hrs to snf    Code Status: Level 3 - DNAR and DNI    Subjective:   Patient reported some nausea this morning which resolved after being given medication states her diet is fair denies any abdominal pain    Objective:     Vitals:   Temp (24hrs), Av.3 °F (36.3 °C), Min:96.5 °F (35.8 °C), Max:98.3 °F (36.8 °C)    Temp:  [96.5 °F (35.8 °C)-98.3 °F (36.8 °C)] 98.3 °F (36.8 °C)  HR:  [70-95] 95  Resp:  [16-18] 18  BP: (116-132)/(68-73) 131/72  SpO2:  [95 %-97 %] 95 %  Body mass index is 16.93 kg/m².     Input and Output Summary (last 24 hours):     Intake/Output Summary (Last 24 hours) at 2024 1622  Last data filed at 2024 0901  Gross per 24 hour   Intake 323 ml   Output 0 ml   Net 323 ml       Physical Exam:   Physical Exam  Vitals and nursing note reviewed.   Constitutional:       General: She is not in acute distress.     Appearance: She is well-developed. She is not toxic-appearing or diaphoretic.   HENT:      Head: Normocephalic and atraumatic.   Eyes:      General: No scleral icterus.     Conjunctiva/sclera: Conjunctivae normal.   Cardiovascular:      Rate and Rhythm: Normal rate and regular rhythm.      Heart sounds: No murmur heard.     No friction rub. No gallop.   Pulmonary:      Effort: Pulmonary effort is normal. No respiratory distress.      Breath sounds: Normal breath sounds. No stridor. No wheezing, rhonchi or rales.   Chest:       Chest wall: No tenderness.   Abdominal:      General: There is no distension.      Palpations: Abdomen is soft. There is no mass.      Tenderness: There is no abdominal tenderness. There is no guarding or rebound.      Hernia: No hernia is present.      Comments: cholecystostomy   Musculoskeletal:         General: No swelling or tenderness.      Cervical back: Neck supple.   Skin:     General: Skin is warm and dry.      Capillary Refill: Capillary refill takes less than 2 seconds.   Neurological:      Mental Status: She is alert.      Motor: Weakness present.   Psychiatric:         Mood and Affect: Mood normal.          Additional Data:     Labs:  Results from last 7 days   Lab Units 24  0520   WBC Thousand/uL 8.06   HEMOGLOBIN g/dL 12.0   HEMATOCRIT % 36.6   PLATELETS Thousands/uL 232   SEGS PCT % 69   LYMPHO PCT % 20   MONO PCT % 6   EOS PCT % 3     Results from last 7 days   Lab Units 24  0520   SODIUM mmol/L 139   POTASSIUM mmol/L 4.0   CHLORIDE mmol/L 103   CO2 mmol/L 29   BUN mg/dL 12   CREATININE mg/dL 0.46*   ANION GAP mmol/L 7   CALCIUM mg/dL 8.2*   ALBUMIN g/dL 2.7*   TOTAL BILIRUBIN mg/dL 0.37   ALK PHOS U/L 37   ALT U/L 7   AST U/L 8*   GLUCOSE RANDOM mg/dL 123                 Results from last 7 days   Lab Units 24  1758   LACTIC ACID mmol/L 0.9       Lines/Drains:  Invasive Devices       Peripheral Intravenous Line  Duration             Peripheral IV 24 Right Antecubital <1 day              Drain  Duration             Open Drain RLQ 1 day                      Telemetry:  Telemetry Orders (From admission, onward)               24 Hour Telemetry Monitoring  Continuous x 24 Hours (Telem)           Question:  Reason for 24 Hour Telemetry  Answer:  Arrhythmias requiring acute medical intervention / PPM or ICD malfunction                              Imaging: No pertinent imaging reviewed.    Recent Cultures (last 7 days):   Results from last 7 days   Lab Units 24  1359    URINE CULTURE  50,000-59,000 cfu/ml Yeast*       Last 24 Hours Medication List:   Current Facility-Administered Medications   Medication Dose Route Frequency Provider Last Rate    acetaminophen  650 mg Oral Q6H PRN Abraham Holley, DO      amoxicillin  1,000 mg Oral Q8H GIANNA Abraham Holley, DO      apixaban  2.5 mg Oral BID Abraham Holley, DO      atorvastatin  40 mg Oral Daily With Dinner Abraham Holley, DO      calcium carbonate-vitamin D  1 tablet Oral BID With Meals Abraham Holley, DO      diltiazem  240 mg Oral Daily Abraham Holley, DO      fluticasone  2 spray Each Nare Daily Abraham Holley, DO      levothyroxine  88 mcg Oral Early Morning Abraham Holley, DO      lisinopril  20 mg Oral Daily Abraham Holley, DO      magnesium Oxide  400 mg Oral BID Geoffrey Alex,       metoprolol  5 mg Intravenous Q6H PRN Luly Dyer PA-C      metoprolol succinate  100 mg Oral BID Abraham Holley, DO      mirtazapine  15 mg Oral HS Geoffrey Alex,       ondansetron  4 mg Intravenous Q6H PRN Abraham Holley, DO      sertraline  50 mg Oral Daily Abraham Holley,           Today, Patient Was Seen By: Geoffrey Alex DO    **Please Note: This note may have been constructed using a voice recognition system.**

## 2024-07-31 NOTE — OCCUPATIONAL THERAPY NOTE
Occupational Therapy Evaluation     Patient Name: Jennifer Patel  Today's Date: 7/31/2024  Problem List  Principal Problem:    Failure to thrive in adult  Active Problems:    Benign essential hypertension    Depressive disorder    Paroxysmal atrial fibrillation with RVR (HCC)    Cholecystitis    Electrolyte abnormality    Abnormal urinalysis    Severe protein-calorie malnutrition (HCC)    Unsteadiness on feet    Past Medical History  Past Medical History:   Diagnosis Date    Disease of thyroid gland     Hyperlipidemia     Hypertension     MI (myocardial infarction) (HCC) 04/2018    Tubal pregnancy      Past Surgical History  Past Surgical History:   Procedure Laterality Date    HYSTERECTOMY             07/31/24 0841   OT Last Visit   OT Visit Date 07/31/24   Note Type   Note type Evaluation   Pain Assessment   Pain Assessment Tool 0-10   Pain Score No Pain   Hospital Pain Intervention(s) Repositioned;Ambulation/increased activity;Emotional support   Restrictions/Precautions   Weight Bearing Precautions Per Order No   Other Precautions Chair Alarm;Bed Alarm;Multiple lines;Telemetry;Fall Risk   Home Living   Type of Home House  (1  with 1 PREETI)   Home Layout One level;Performs ADLs on one level;Able to live on main level with bedroom/bathroom;Access   Bathroom Shower/Tub Tub/shower unit   Bathroom Toilet Standard   Bathroom Equipment Grab bars in shower;Commode;Shower chair   Bathroom Accessibility Accessible   Home Equipment Walker;Cane;Wheelchair-manual   Additional Comments Pt reports living with her  and son in a 1  with 1 PREETI; reports that typicalyl she uses w/ c for functional mobility however uses RW intermittently for short distances vs when she feels good; reports that her son assists with transfers   Prior Function   Level of [place] Needs assistance with ADLs;Needs assistance with functional mobility;Needs assistance with IADLS   Lives With Spouse;Son   Receives Help From Family  "  IADLs Family/Friend/Other provides transportation;Family/Friend/Other provides meals;Family/Friend/Other provides medication management   Falls in the last 6 months 1 to 4  (Pt reporting 0 falls however per chart review, pt w/ 1 fall)   Vocational Retired   Comments (-) driving; pt reporting that she completes ADLs to the best of her ability however that her son typically completes them for her   Lifestyle   Autonomy PTA, pt has assistance with ADLs/IADLs and uses w/c mainly for functional mobility w/ occasional use of RW; (-) driving   Reciprocal Relationships Lives with her  and son   Service to Others Retired   Intrinsic Gratification Enjoys reading   Subjective   Subjective \"My son helps me\"   ADL   Where Assessed Chair   Eating Assistance 5  Supervision/Setup   Grooming Assistance 4  Minimal Assistance   UB Bathing Assistance 3  Moderate Assistance   LB Bathing Assistance 2  Maximal Assistance   UB Dressing Assistance 3  Moderate Assistance   LB Dressing Assistance 2  Maximal Assistance   Toileting Assistance  2  Maximal Assistance   Functional Assistance 3  Moderate Assistance   Bed Mobility   Supine to Sit 3  Moderate assistance   Additional items Assist x 1;HOB elevated;Bedrails;Increased time required;Verbal cues;LE management   Sit to Supine Unable to assess   Additional Comments When sitting EOB, pt presenting with right lateral lean, requiring varying Min A for upright posture; @ end of session, pt left sitting upright in recliner with all functional needs in reach with chair alarm activated   Transfers   Sit to Stand 4  Minimal assistance   Additional items Assist x 2;Increased time required;Verbal cues   Stand to Sit 4  Minimal assistance   Additional items Assist x 2;Increased time required;Verbal cues   Additional Comments w/ b/l HHA   Functional Mobility   Functional Mobility 4  Minimal assistance   Additional Comments Pt completed few small steps from EOB <> recliner w/ Min A x2 w/ b/l HHA " for safety and support   Additional items Hand hold assistance   Balance   Static Sitting Fair -   Dynamic Sitting Poor +   Static Standing Poor +   Dynamic Standing Poor   Ambulatory Poor   Activity Tolerance   Activity Tolerance Patient limited by fatigue   Medical Staff Made Aware JESSE Hall   Nurse Made Aware RN cleared   RUE Assessment   RUE Assessment WFL   LUE Assessment   LUE Assessment WFL   Hand Function   Gross Motor Coordination Functional   Fine Motor Coordination Functional   Cognition   Arousal/Participation Responsive;Arousable;Cooperative   Attention Attends with cues to redirect   Orientation Level Oriented X4   Memory Decreased recall of precautions   Following Commands Follows one step commands without difficulty   Comments Pt pleasant and cooperative; flat affect   Assessment   Limitation Decreased ADL status;Decreased Safe judgement during ADL;Decreased endurance;Decreased high-level ADLs;Decreased self-care trans   Prognosis Fair   Assessment Pt is a 88 yo female who presented to [facility] with abdominal pain, nausea/vomiting, dizziness, decreased appetite, and decreased oral intake in which pt dx w/ failure to thrive in adult. Pt  has a past medical history of Disease of thyroid gland, Hyperlipidemia, Hypertension, MI (myocardial infarction) (HCC) (04/2018), and Tubal pregnancy. Pt with active OT orders in which OT consulted to assess pt's functional status and occupational performance to determine safe d/c needs. Pt seen with PT to increase safety, decrease fall risk, and maximize functional/occupational performance 2* medical complexity which is a regression from pt's functional baseline. Pt lives with his wife and son in a 1 SH with 1 PREETI. PTA, pt has assistance with ADLs/IADLs and uses w/c vs RW for functional mobility. (-) driving. Currently, pt performing bed mobility w/ Mod A, functional transfers/mobility w/ Min A x 2 w/ b/l HHA, UB ADLs w/ Mod A, and LB ADLs w/ Max A. Pt demonstrates the  following limitations/impairments which impact the pt's ability to engage in valued occupations: balance, endurance/activity tolerance, standing tolerance, functional reach, postural/trunk control, strength, safety awareness, and pain. From an OT standpoint, recommend discharge to post-acute rehab once medically stable. The patient's raw score on the -PAC Daily Activity Inpatient Short Form is 14. A raw score of less than 19 suggests the patient may benefit from discharge to post-acute rehabilitation services. Please refer to the recommendation of the Occupational Therapist for safe discharge planning.  Pt would benefit from skilled OT services 2-3x/wk to address acute care needs and underlying performance skills to promote safety, decrease fall risk, and enhance occupational performance to return to [facility]. Goals to be met within the next 10-14 days.   Goals   Patient Goals To go home   LTG Time Frame 10-14   Long Term Goal #1 See OT goals listed below   Plan   Treatment Interventions ADL retraining;Functional transfer training;UE strengthening/ROM;Endurance training;Patient/family training;Equipment evaluation/education;Compensatory technique education;Continued evaluation;Energy conservation;Activityengagement   Goal Expiration Date 08/14/24   OT Frequency 2-3x/wk   Discharge Recommendation   Rehab Resource Intensity Level, OT II (Moderate Resource Intensity)   AM-PAC Daily Activity Inpatient   Lower Body Dressing 2   Bathing 2   Toileting 2   Upper Body Dressing 2   Grooming 3   Eating 3   Daily Activity Raw Score 14   Daily Activity Standardized Score (Calc for Raw Score >=11) 33.39   AM-PAC Applied Cognition Inpatient   Following a Speech/Presentation 3   Understanding Ordinary Conversation 4   Taking Medications 3   Remembering Where Things Are Placed or Put Away 3   Remembering List of 4-5 Errands 3   Taking Care of Complicated Tasks 3   Applied Cognition Raw Score 19   Applied Cognition Standardized Score  39.77   End of Consult   Education Provided Yes   Patient Position at End of Consult Bedside chair;Bed/Chair alarm activated;All needs within reach   Nurse Communication Nurse aware of consult     OT GOALS:    Pt will improve functional mobility during ADL/IADL/leisure tasks with S using AE/DME prn.    Pt will improve activity tolerance/functional endurance during ADL/IADL/leisure tasks for at least 20 minutes to improve occupational performance and engagement in valued occupations using AE/DME prn.    Pt will engage in ongoing functional/formal cognitive assessments to assist with safe d/c planning and increase safety during functional tasks.    Pt will be A/Ox4 100% of the time and follow at least 2 step commands during functional activities with no verbal cues to increase attention, safety, and engagement in ADL/IADL/leisure tasks.    Pt will improve dynamic standing balance for at least 15 minutes with S during functional tasks to decrease fall risk and improve independence and engagement in ADL/IADL/leisure activities.    Pt will complete functional transfers on and off all surfaces used in daily routines with S for safety to maximize functional/occupational performance.    Pt will complete all bed mobility tasks with S to serve as a prerequisite for EOB/OOB ADL/IADL/leisure tasks, optimize positioning/comfort, and increase functional independence.    Pt will independently demonstrate good carryover of safety precautions and education/training during ADL/IADL/leisure tasks with energy conservation techniques s/p skilled instruction without verbal cues.    Pt will complete UB ADL tasks with S using AE/DME prn to increase functional independence in ADL/IADL/leisure tasks.    Pt will complete LB ADL tasks with Min A using AE/DME prn to increase functional independence in ADL/IADL/leisure tasks.     Pt will complete toileting tasks with Min A and good hygiene/thoroughness using AE/DME prn to increase functional  independence.    Pt will independently identify and utilize 2-3 positive coping strategies to enhance overall wellbeing and engagement in valued occupations.    Nat Reeder MS, OTR/L

## 2024-07-31 NOTE — PHYSICAL THERAPY NOTE
Physical Therapy Evaluation     Patient's Name: Jennifer Patel    Admitting Diagnosis  Depressive disorder [F32.A]  Weakness [R53.1]  Failure to thrive in adult [R62.7]  Rapid atrial fibrillation (HCC) [I48.91]    Problem List  Patient Active Problem List   Diagnosis    Benign essential hypertension    Stage 3 chronic kidney disease (HCC)    Coronary artery disease involving native coronary artery of native heart without angina pectoris    Depressive disorder    Paroxysmal atrial fibrillation with RVR (HCC)    Cholecystitis    Failure to thrive in adult    Electrolyte abnormality    Abnormal urinalysis    Severe protein-calorie malnutrition (HCC)    Unsteadiness on feet       Past Medical History  Past Medical History:   Diagnosis Date    Disease of thyroid gland     Hyperlipidemia     Hypertension     MI (myocardial infarction) (HCC) 04/2018    Tubal pregnancy        Past Surgical History  Past Surgical History:   Procedure Laterality Date    HYSTERECTOMY            07/31/24 0856   PT Last Visit   PT Visit Date 07/31/24   Note Type   Note type Evaluation   Pain Assessment   Pain Assessment Tool 0-10   Pain Score No Pain   Restrictions/Precautions   Weight Bearing Precautions Per Order No   Other Precautions Chair Alarm;Bed Alarm;Multiple lines;Telemetry;Fall Risk   Home Living   Type of Home House   Home Layout One level  (1STE)   Bathroom Shower/Tub Tub/shower unit   Bathroom Toilet Standard   Bathroom Equipment Grab bars in shower;Shower chair;Commode   Home Equipment Walker;Wheelchair-manual   Additional Comments uses WC for mobility, gets assistance x1 to transfer into .  Sometimes self propels, sometimes son pushes her.  Pt reports she is also able to walk short distances with RW at times with assistance   Prior Function   Level of [place] Needs assistance with ADLs;Needs assistance with functional mobility;Needs assistance with IADLS   Lives With Spouse;Son   Receives Help From Family   Falls in  the last 6 months 1 to 4   Vocational   (1)   General   Family/Caregiver Present No   Cognition   Arousal/Participation Alert   Attention Attends with cues to redirect   Orientation Level Oriented X4   Memory Decreased recall of precautions   Following Commands Follows one step commands without difficulty   Subjective   Subjective Pleasant and agreeable to participate   RLE Assessment   RLE Assessment   (functionally 3-/5)   LLE Assessment   LLE Assessment   (functionally 3-/5)   Bed Mobility   Supine to Sit 3  Moderate assistance   Additional items Assist x 1;Increased time required;Verbal cues;LE management   Additional Comments Sitting EOB with R lean   Transfers   Sit to Stand 4  Minimal assistance   Additional items Assist x 2;Increased time required;Verbal cues   Stand to Sit 4  Minimal assistance   Additional items Assist x 2;Increased time required;Verbal cues   Additional Comments with b/l HHA and knee block   Ambulation/Elevation   Gait pattern Excessively slow;Step to;Short stride;Decreased foot clearance;Improper Weight shift;Shuffling   Gait Assistance 4  Minimal assist   Additional items Assist x 2;Verbal cues;Tactile cues   Assistive Device Other (Comment)  (b/l HHA and knee block)   Distance 3 ft from bed to chair   Balance   Static Sitting Fair -   Dynamic Sitting Poor +   Static Standing Poor +   Dynamic Standing Poor   Ambulatory Poor   Activity Tolerance   Activity Tolerance Patient limited by fatigue   Medical Staff Made Aware OT Nat   Nurse Made Aware RN cleared pt to be seen by PT   Assessment   Prognosis Fair   Problem List Decreased strength;Decreased range of motion;Decreased endurance;Impaired balance;Decreased mobility;Decreased safety awareness   Assessment Pt seen for high complexity PT evaluation due to decrease in functional mobility status compared to baseline.  Pt with active PT eval/treat orders at this time.  Pt is a 89 y.o. F who presented to [facility] with  abdominal pain and nausea on 7/29/24, found to have gram negative bacteremia.  Primary dx is failure to thrive in an adult.  Pt  has a past medical history of Disease of thyroid gland, Hyperlipidemia, Hypertension, MI (myocardial infarction) (HCC) (04/2018), and Tubal pregnancy.  Pt resides with spouse and son in 1SH with 1STE.  Pt presents with decreased strength, balance, endurance that contribute to limitations in bed mobility, functional transfers, functional mobility.  Pt requires Min A x 2 for STS and short distance mobility at this time.  Pt left upright in bedside chair with chair alarm intact and with all needs in reach.  Pt will benefit from skilled therapy in order to address current impairments and functional limitations. PT to follow pt and recommending level II.  The patient's AM-PAC Basic Mobility Inpatient Short Form Raw Score is 9. A Raw score of less than or equal to 16 suggests the patient may benefit from discharge to post-acute rehabilitation services. Please also refer to the recommendation of the Physical Therapist for safe discharge planning.   Barriers to Discharge Inaccessible home environment;Decreased caregiver support   Goals   Patient Goals to go home   STG Expiration Date 08/14/24   Short Term Goal #1 1. Pt will demonstrate ability to perform all aspects of bed mobility with Min A in order to increase independence and decrease burden on caregivers. 2. Pt will demonstrate ability to perform functional transfers with Min A in order to increase independence and decrease burden on caregivers.  3. Pt will demonstrate ability to ambulate 200 ft with least restrictive AD with Min A in order to return to mobility safely. 4. Pt will demonstrate ability to negotiate full flight steps with/without HR and Mod A in order to return to household/community mobility safely. 5. Pt will demonstrate improved balance by one grade order to decrease risk of falls.  6. Pt will increase b/l LE strength by 1  grade in order to increase ease of functional mobility and transfers.   Plan   Treatment/Interventions Functional transfer training;LE strengthening/ROM;Therapeutic exercise;Endurance training;Patient/family training;Equipment eval/education;Bed mobility;Gait training;Spoke to nursing   PT Frequency 3-5x/wk   Discharge Recommendation   Rehab Resource Intensity Level, PT II (Moderate Resource Intensity)   AM-PAC Basic Mobility Inpatient   Turning in Flat Bed Without Bedrails 3   Lying on Back to Sitting on Edge of Flat Bed Without Bedrails 2   Moving Bed to Chair 1   Standing Up From Chair Using Arms 1   Walk in Room 1   Climb 3-5 Stairs With Railing 1   Basic Mobility Inpatient Raw Score 9   Turning Head Towards Sound 4   Follow Simple Instructions 3   Low Function Basic Mobility Raw Score  16   Low Function Basic Mobility Standardized Score  25.72   [facility] Highest Level Of Mobility   -HLM Goal 3: Sit at edge of bed   -HLM Achieved 4: Move to chair/commode   Modified Buchanan Scale   Modified Rajesh Scale 4         Isabel Patel, PT, DPT

## 2024-07-31 NOTE — ASSESSMENT & PLAN NOTE
[facility] s[facility]p percutaneous cholecystostomy tube placement; appears patient was felt too high risk that time for surgical intervention.   CT abdomen/pelvis shows tube, no obstruction  Currently on amoxicillin following hospitalization earlier this month at [facility] for gram-negative bacteremia, continue through treatment course  Otherwise continue routine cholecystostomy care

## 2024-07-31 NOTE — PLAN OF CARE
Problem: OCCUPATIONAL THERAPY ADULT  Goal: Performs self-care activities at highest level of function for planned discharge setting.  See evaluation for individualized goals.  Description: Treatment Interventions: ADL retraining, Functional transfer training, UE strengthening/ROM, Endurance training, Patient/family training, Equipment evaluation/education, Compensatory technique education, Continued evaluation, Energy conservation, Activityengagement          See flowsheet documentation for full assessment, interventions and recommendations.   Note: Limitation: Decreased ADL status, Decreased Safe judgement during ADL, Decreased endurance, Decreased high-level ADLs, Decreased self-care trans  Prognosis: Fair  Assessment: Pt is a 88 yo female who presented to [facility] with abdominal pain, nausea/vomiting, dizziness, decreased appetite, and decreased oral intake in which pt dx w/ failure to thrive in adult. Pt  has a past medical history of Disease of thyroid gland, Hyperlipidemia, Hypertension, MI (myocardial infarction) (HCC) (04/2018), and Tubal pregnancy. Pt with active OT orders in which OT consulted to assess pt's functional status and occupational performance to determine safe d/c needs. Pt seen with PT to increase safety, decrease fall risk, and maximize functional/occupational performance 2* medical complexity which is a regression from pt's functional baseline. Pt lives with his wife and son in a 1 SH with 1 PREETI. PTA, pt has assistance with ADLs/IADLs and uses w/c vs RW for functional mobility. (-) driving. Currently, pt performing bed mobility w/ Mod A, functional transfers/mobility w/ Min A x 2 w/ b/l HHA, UB ADLs w/ Mod A, and LB ADLs w/ Max A. Pt demonstrates the following limitations/impairments which impact the pt's ability to engage in valued occupations: balance, endurance/activity tolerance, standing tolerance, functional reach, postural/trunk control, strength, safety awareness, and pain. From an OT  standpoint, recommend discharge to post-acute rehab once medically stable. The patient's raw score on the AM-PAC Daily Activity Inpatient Short Form is 14. A raw score of less than 19 suggests the patient may benefit from discharge to post-acute rehabilitation services. Please refer to the recommendation of the Occupational Therapist for safe discharge planning.  Pt would benefit from skilled OT services 2-3x/wk to address acute care needs and underlying performance skills to promote safety, decrease fall risk, and enhance occupational performance to return to [facility]. Goals to be met within the next 10-14 days.     Rehab Resource Intensity Level, OT: II (Moderate Resource Intensity)

## 2024-07-31 NOTE — PROGRESS NOTES
Progress Note - Geriatric Medicine   Jennifer Patel 89 y.o. female MRN: [id number]  Unit/Bed#: [facility] 520-01 Encounter: [id number]      Assessment/Plan:    Failure to Thrive  Decreased appetite over past 2 months  Weight loss of 25 pounds documented from hospitalizations from 5/25 - 7/21  Diagnosed with cholecystitis during [facility] hospitalization in May 2024  Perc cholecystostomy placed 5/28  Has had visiting nurses for OT/PT since then at home  No surgical intervention required at that time  During most recent hospital admission at [facility], 7/21, +Ecoli from Cholecystostomy  No EGD performed, still no surgical intervention required  UGI completed and saw esophageal dysmotility  Not started on any medications for nausea/decreased appetite at discharge  Continue Zofran 4 mg IV q6h prn   TSH 7/29/24: 5.631   Vit B12 7/30/24: 788  Mag 7/31: 2.0  Upon exam today, patient is quiet, soft spoken, endorses decreased appetite  History of depression, continue Sertraline 50 mg once daily  Continue Mirtazapine 15 mg HS   Monitor for sedation, lethargy, daytime somnolence     Decreased Appetite  Diagnosed with cholecystitis in May at [facility], percutaneous cholecystostomy placed  +Ecoli, leukocytosis during admission 7/21 at [facility]  Treated with antibiotics, ID consulted during admission  Questionable whether tube was clamped PTA   Currently draining  CT abd 7/29: collapsed gallbladder, alicja tube in place, no biliary obstruction; diverticulosis w/o evidence of diverticulitis or colitis; small bilateral pleural effusions  Denies nausea, vomiting upon exam  Recommendation above for Mirtazapine at bedtime to help stimulate appetite  Encourage adequate hydration/nutrition  Continue protein supplementation     Frailty  Clinical Frail Scale: 6- Moderately Frail  Previously independent with ADLs, needs assistance with IADLs  Lives with son and  on one floor home  Son drives  and patient to appointments, cooks, clean, transport,  bills, medications  States she does not use assistive device at home  Has barely been getting OOB over past couple weeks     Severe Protein Malnutrition  Secondary to decrease in appetite, weight loss of 15-20 pounds over past couple months  Labs from 7/30: Total Protein 4.8; Albumin: 2.7  Risk factors: depression, poor appetite, loss of executive function ie shopping, cooking  Dietary consult being completed  Continue protien supplementation, Ensure breakfast and dinner, Magic cup at lunch  BMI of 16.93  Stating ate minimally for meals yesterday and today  Ordered Calorie count      Urinary Retention  Intermittent straight cath yesterday, no documented incontinence, output today   UA 7/29, +nitrites, small blood  UC 7/31 +yeast  Encourage adequate hydration  Urinary retention protocol     Risk for Delirium  Patient at risk for delirium secondary to age, poly pharmacy, hospitalization, infection, electrolyte imbalance, immobility  Identify and treat underlying cause  Provide frequent redirection, reorientation, distraction techniques  Avoid deliriogenic medications such as tramadol, benzodiazepines, anticholinergics,  Benadryl  Treat pain, See geriatric pain protocol  Monitor for constipation and urinary retention  Last BM 7/30/2024  Encourage early and frequent moblization, OOB  Encourage Hydration/ Nutrition  Implement sleep hygiene, limit night time interuptions, group activities  Avoid physical restraints  Use chemical restraint only when other efforts have failed, recommend zyprexa 2.5mg IM q8h prn  Monitor Qtc, if greater than 500 do not use antipsychotic medication  Most recent EKG from 7/29 QTC: 488  If QTc greater than 460, monitor and replete and deficiency of  K and Mg, recheck EKG  K 7/30: 3.2, 7/31: 4.0 - replenished yesterday with Klor Con 40 mEq one time dose     Paroxysmal Atrial Fibrillation  Presented to ED for dizziness, tachycardia  Had not taken medications d/t decreased appetite  Given IV  "Lopressor, Restarted on PTA medications, Toprol  mg BID and Diltiazem 240 mg once daily  No complaints of CP, dizziness,lightheadedness, palpitations upon exam today  Continue to monitor by primary care team    Subjective:     Jennifer Patel is an 89 year old female who is seen today for a geriatric follow up. She is sitting in the recliner upon exam today. She is calm and pleasant. She states she is eating, has similar appetite, Mirtazapine being started tonight. She states she was nauseous this morning, was given medication, and felt relief. She denies any CP, SOB, dizziness/lightheadedness, headaches, abdominal pain, n/v/d/c currently.      Review of Systems   Constitutional:  Negative for chills, fatigue and fever.   HENT:  Negative for congestion and sore throat.    Eyes:  Negative for pain and visual disturbance.   Respiratory:  Negative for cough, chest tightness and shortness of breath.    Cardiovascular:  Negative for chest pain, palpitations and leg swelling.   Gastrointestinal:  Positive for nausea (Nauseous this morning, but now relieved). Negative for abdominal pain and vomiting.   Genitourinary:  Negative for dysuria and hematuria.   Musculoskeletal:  Negative for arthralgias and back pain.   Skin:  Negative for color change and rash.   Neurological:  Negative for seizures and syncope.   All other systems reviewed and are negative.        Objective:     Vitals: Blood pressure 131/72, pulse 95, temperature 98.3 °F (36.8 °C), temperature source Rectal, resp. rate 18, height 5' 3\" (1.6 m), weight 43.4 kg (95 lb 9.6 oz), SpO2 95%.,Body mass index is 16.93 kg/m².      Intake/Output Summary (Last 24 hours) at 7/31/2024 1205  Last data filed at 7/31/2024 0901  Gross per 24 hour   Intake 1423 ml   Output 1000 ml   Net 423 ml       Current Medications: Reviewed    Physical Exam:   Physical Exam  Vitals and nursing note reviewed.   Constitutional:       General: She is not in acute distress.     " Appearance: Normal appearance. She is well-developed.      Comments: Frail appearance   HENT:      Head: Normocephalic and atraumatic.      Right Ear: External ear normal.      Left Ear: External ear normal.      Nose: Nose normal.      Mouth/Throat:      Mouth: Mucous membranes are dry.      Pharynx: Oropharynx is clear.   Eyes:      Conjunctiva/sclera: Conjunctivae normal.      Pupils: Pupils are equal, round, and reactive to light.   Cardiovascular:      Rate and Rhythm: Normal rate and regular rhythm.      Pulses: Normal pulses.      Heart sounds: Normal heart sounds. No murmur heard.  Pulmonary:      Effort: Pulmonary effort is normal. No respiratory distress.      Breath sounds: Normal breath sounds.   Abdominal:      General: Abdomen is flat. Bowel sounds are normal.      Palpations: Abdomen is soft.      Tenderness: There is no abdominal tenderness.      Comments: Cholecystostomy intact, no signs of infection, +draining   Musculoskeletal:         General: No swelling. Normal range of motion.      Cervical back: Normal range of motion and neck supple.   Skin:     General: Skin is warm and dry.      Capillary Refill: Capillary refill takes less than 2 seconds.   Neurological:      General: No focal deficit present.      Mental Status: She is alert. Mental status is at baseline.      Motor: Weakness (generalized) present.      Gait: Gait abnormal (use of RW).   Psychiatric:         Mood and Affect: Mood normal.         Behavior: Behavior normal.         Thought Content: Thought content normal.         Judgment: Judgment normal.          Invasive Devices       Peripheral Intravenous Line  Duration             Peripheral IV 07/30/24 Right Antecubital <1 day              Drain  Duration             Open Drain RLQ 1 day                    Lab, Imaging and other studies:    Lab Results:   I have personally reviewed pertinent lab results including the following:  - CMP, Magnesium, CBC, Urine culture    - I have  personally reviewed pertinent reports.   and I have personally reviewed pertinent films in PACS

## 2024-07-31 NOTE — CASE MANAGEMENT
Case Management Discharge Planning Note    Patient name Jennifer Patel  Location [facility] 520/[facility] 520-01 MRN [id number]  : 1935 Date 2024       Current Admission Date: 2024  Current Admission Diagnosis:Failure to thrive in adult   Patient Active Problem List    Diagnosis Date Noted Date Diagnosed    Electrolyte abnormality 2024     Abnormal urinalysis 2024     Severe protein-calorie malnutrition (HCC) 2024     Unsteadiness on feet 2024     Stage 3 chronic kidney disease (HCC) 2024     Failure to thrive in adult 2024     Cholecystitis 2024     Paroxysmal atrial fibrillation with RVR (HCC) 2023     Coronary artery disease involving native coronary artery of native heart without angina pectoris 10/19/2018     Depressive disorder 2008     Benign essential hypertension 2007       LOS (days): 2  Geometric Mean LOS (GMLOS) (days): 3.6  Days to GMLOS:1.8     OBJECTIVE:  Risk of Unplanned Readmission Score: 13.23         Current admission status: Inpatient   Preferred Pharmacy:   [facility] [place] - [address]  [address]  [facility] [address]  Phone: [phone] Fax: [phone]    Primary Care Provider: Curtis Jackson MD    Primary Insurance: MEDICARE  Secondary Insurance: [place] [employer]    DISCHARGE DETAILS:    CM left voicemail for christina with cm call back information.     CM spoke with carlos eduardo from [facility] on [facility] who informed cm they have accepted.      CM reserved due to prior cm note. Sons first choice is [facility], as patient's primary MD oversees the facility.     Christina Patel (Son)   [phone] (M)

## 2024-07-31 NOTE — PLAN OF CARE
Problem: Nutrition/Hydration-ADULT  Goal: Nutrient/Hydration intake appropriate for improving, restoring or maintaining nutritional needs  Description: Monitor and assess patient's nutrition/hydration status for malnutrition. Collaborate with interdisciplinary team and initiate plan and interventions as ordered.  Monitor patient's weight and dietary intake as ordered or per policy. Utilize nutrition screening tool and intervene as necessary. Determine patient's food preferences and provide high-protein, high-caloric foods as appropriate.     INTERVENTIONS:  - Monitor oral intake, urinary output, labs, and treatment plans  - Assess nutrition and hydration status and recommend course of action  - Evaluate amount of meals eaten  - Assist patient with eating if necessary   - Allow adequate time for meals  - Recommend/ encourage appropriate diets, oral nutritional supplements, and vitamin/mineral supplements  - Order, calculate, and assess calorie counts as needed  - Recommend, monitor, and adjust tube feedings and TPN/PPN based on assessed needs  - Assess need for intravenous fluids  - Provide specific nutrition/hydration education as appropriate  - Include patient/family/caregiver in decisions related to nutrition  Outcome: Progressing     Problem: Prexisting or High Potential for Compromised Skin Integrity  Goal: Skin integrity is maintained or improved  Description: INTERVENTIONS:  - Identify patients at risk for skin breakdown  - Assess and monitor skin integrity  - Assess and monitor nutrition and hydration status  - Monitor labs   - Assess for incontinence   - Turn and reposition patient  - Assist with mobility/ambulation  - Relieve pressure over bony prominences  - Avoid friction and shearing  - Provide appropriate hygiene as needed including keeping skin clean and dry  - Evaluate need for skin moisturizer/barrier cream  - Collaborate with interdisciplinary team   - Patient/family teaching  - Consider wound  care consult   Outcome: Progressing

## 2024-07-31 NOTE — ASSESSMENT & PLAN NOTE
"Patient presented with tachycardia/lightheadedness, along with ongoing low oral intake/progressive weight loss over the past 2 months of at least 15 pounds  Several recent hospitalizations at [facility] 5/2024 with similar picture found with acute cholecystitis now s/p percutaneous cholecystostomy as patient appears to have been felt to be too high surgical risk, additionally underwent EGD at that time reportedly without concerning findings.  Had subsequent hospitalization earlier this month at same facility with abdominal pain with nausea/vomiting found with leukocytosis and blood cultures with gram-negative bacteremia initially requiring IV antibiotics and transitioned to oral amoxicillin.  Unfortunately following discharge patient with ongoing poor appetite with minimal oral intake and worsening weakness/dizziness which prompted presentation now  With atrial fibrillation with RVR on arrival, management of this as per associated problem.  Labs otherwise without marked abnormalities compared to prior.  Family expressing concern with ongoing decline/weakness  CT abdomen/pelvis with collapsed gallbladder, alicja tube adjacent to the tip of the fundus, no obstruction, small bilateral pleural effusions, diverticulosis without colitis or diverticulitis   TSH/B12/folate acceptable  Nutrition services consult pending   Replete Mg and K, gentle IVF   Consult geriatrics, family feels she has \"given up\" and interested in possible medication to help improve appetite  Further workup as appropriate dependent on clinical course  Family expressed concern as above given ongoing decline/weakness, would like consideration for at least temporary SNF placement.  PT/OT evaluations and CM consult to assist with disposition  "

## 2024-08-01 LAB
ANION GAP SERPL CALCULATED.3IONS-SCNC: 6 MMOL/L (ref 4–13)
ATRIAL RATE: 57 BPM
BUN SERPL-MCNC: 17 MG/DL (ref 5–25)
CALCIUM SERPL-MCNC: 8.5 MG/DL (ref 8.4–10.2)
CHLORIDE SERPL-SCNC: 100 MMOL/L (ref 96–108)
CO2 SERPL-SCNC: 29 MMOL/L (ref 21–32)
CREAT SERPL-MCNC: 0.54 MG/DL (ref 0.6–1.3)
GFR SERPL CREATININE-BSD FRML MDRD: 84 ML/MIN/1.73SQ M
GLUCOSE SERPL-MCNC: 106 MG/DL (ref 65–140)
POTASSIUM SERPL-SCNC: 4.1 MMOL/L (ref 3.5–5.3)
QRS AXIS: 56 DEGREES
QRSD INTERVAL: 82 MS
QT INTERVAL: 420 MS
QTC INTERVAL: 433 MS
SODIUM SERPL-SCNC: 135 MMOL/L (ref 135–147)
T WAVE AXIS: 46 DEGREES
VENTRICULAR RATE: 64 BPM

## 2024-08-01 PROCEDURE — 80048 BASIC METABOLIC PNL TOTAL CA: CPT | Performed by: INTERNAL MEDICINE

## 2024-08-01 PROCEDURE — 99233 SBSQ HOSP IP/OBS HIGH 50: CPT | Performed by: INTERNAL MEDICINE

## 2024-08-01 PROCEDURE — 97110 THERAPEUTIC EXERCISES: CPT

## 2024-08-01 PROCEDURE — 97530 THERAPEUTIC ACTIVITIES: CPT

## 2024-08-01 PROCEDURE — 93010 ELECTROCARDIOGRAM REPORT: CPT | Performed by: INTERNAL MEDICINE

## 2024-08-01 PROCEDURE — 99232 SBSQ HOSP IP/OBS MODERATE 35: CPT | Performed by: INTERNAL MEDICINE

## 2024-08-01 RX ADMIN — AMOXICILLIN 1000 MG: 500 CAPSULE ORAL at 15:40

## 2024-08-01 RX ADMIN — DILTIAZEM HYDROCHLORIDE 240 MG: 240 CAPSULE, COATED, EXTENDED RELEASE ORAL at 09:05

## 2024-08-01 RX ADMIN — MAGNESIUM OXIDE TAB 400 MG (241.3 MG ELEMENTAL MG) 400 MG: 400 (241.3 MG) TAB at 17:33

## 2024-08-01 RX ADMIN — METOPROLOL SUCCINATE 100 MG: 100 TABLET, EXTENDED RELEASE ORAL at 09:05

## 2024-08-01 RX ADMIN — SERTRALINE HYDROCHLORIDE 50 MG: 50 TABLET ORAL at 09:05

## 2024-08-01 RX ADMIN — LEVOTHYROXINE SODIUM 88 MCG: 88 TABLET ORAL at 05:44

## 2024-08-01 RX ADMIN — AMOXICILLIN 1000 MG: 500 CAPSULE ORAL at 05:44

## 2024-08-01 RX ADMIN — LISINOPRIL 20 MG: 20 TABLET ORAL at 09:05

## 2024-08-01 RX ADMIN — APIXABAN 2.5 MG: 2.5 TABLET, FILM COATED ORAL at 09:05

## 2024-08-01 RX ADMIN — Medication 1 TABLET: at 09:13

## 2024-08-01 RX ADMIN — APIXABAN 2.5 MG: 2.5 TABLET, FILM COATED ORAL at 17:33

## 2024-08-01 RX ADMIN — Medication 1 TABLET: at 17:33

## 2024-08-01 RX ADMIN — MAGNESIUM OXIDE TAB 400 MG (241.3 MG ELEMENTAL MG) 400 MG: 400 (241.3 MG) TAB at 09:05

## 2024-08-01 RX ADMIN — AMOXICILLIN 1000 MG: 500 CAPSULE ORAL at 21:32

## 2024-08-01 RX ADMIN — METOPROLOL SUCCINATE 100 MG: 100 TABLET, EXTENDED RELEASE ORAL at 21:31

## 2024-08-01 RX ADMIN — ATORVASTATIN CALCIUM 40 MG: 40 TABLET, FILM COATED ORAL at 17:33

## 2024-08-01 RX ADMIN — MIRTAZAPINE 15 MG: 15 TABLET, FILM COATED ORAL at 21:31

## 2024-08-01 NOTE — ASSESSMENT & PLAN NOTE
[facility], s/p percutaneous cholecystostomy tube placement; appears patient was felt too high risk that time for surgical intervention.   CT abdomen/pelvis shows tube, no obstruction  Currently on amoxicillin following hospitalization earlier this month at [facility] for gram-negative bacteremia, continue through treatment course  Otherwise continue routine cholecystostomy care

## 2024-08-01 NOTE — PROGRESS NOTES
Progress Note - Geriatric Medicine   Jennifer Patel 89 y.o. female MRN: [id number]  Unit/Bed#: [facility] 520-01 Encounter: [id number]      Assessment/Plan:    Failure to Thrive  Decreased appetite over past 2 months  Weight loss of 25 pounds documented from hospitalizations from 5/25 - 7/21  Diagnosed with cholecystitis during [facility] hospitalization in May 2024  Perc cholecystostomy placed 5/28  Has had visiting nurses for OT/PT since then at home  No surgical intervention required at that time  During most recent hospital admission at [facility], 7/21, +Ecoli from Cholecystostomy  No EGD performed, still no surgical intervention required  UGI completed and saw esophageal dysmotility  Not started on any medications for nausea/decreased appetite at discharge  Continue Zofran 4 mg IV q6h prn   TSH 7/29/24: 5.631   Vit B12 7/30/24: 788  Mag 7/31: 2.0  Upon exam today, patient is quiet, soft spoken, endorses decreased appetite  History of depression, continue Sertraline 50 mg once daily  Continue Mirtazapine 15 mg HS   Monitor for sedation, lethargy, daytime somnolence     Decreased Appetite  Diagnosed with cholecystitis in May at [facility], percutaneous cholecystostomy placed  +Ecoli, leukocytosis during admission 7/21 at [facility]  Treated with antibiotics, ID consulted during admission  Questionable whether tube was clamped PTA   Currently draining  CT abd 7/29: collapsed gallbladder, alicja tube in place, no biliary obstruction; diverticulosis w/o evidence of diverticulitis or colitis; small bilateral pleural effusions  Denies nausea, vomiting upon exam  Recommendation above for Mirtazapine at bedtime to help stimulate appetite  Encourage adequate hydration/nutrition  Continue protein supplementation  Ate 100% of breakfast, 75% of lunch; improved appetite from yesterday per patient and according to chart review     Frailty  Clinical Frail Scale: 6- Moderately Frail  Previously independent with ADLs, needs assistance with IADLs  Lives  with son and  on one floor home  Son drives  and patient to appointments, cooks, clean, transport, bills, medications  States she does not use assistive device at home  Has barely been getting OOB over past couple weeks  PT/OT at SNF post discharge, per son first choice [facility]     Severe Protein Malnutrition  Secondary to decrease in appetite, weight loss of 15-20 pounds over past couple months  Labs from 7/31: Total Protein 4.8; Albumin: 2.7  Risk factors: depression, poor appetite, loss of executive function ie shopping, cooking  Dietary consult being completed  Continue protien supplementation, Ensure breakfast and dinner, Magic cup at lunch  BMI of 16.93  Stating ate minimally for meals yesterday and today  Continue calorie count     Risk for Delirium  Patient at risk for delirium secondary to age, poly pharmacy, hospitalization, infection, electrolyte imbalance, immobility  Identify and treat underlying cause  Provide frequent redirection, reorientation, distraction techniques  Avoid deliriogenic medications such as tramadol, benzodiazepines, anticholinergics,  Benadryl  Treat pain, See geriatric pain protocol  Monitor for constipation and urinary retention  Last BM 7/30/2024  Encourage early and frequent moblization, OOB  Encourage Hydration/ Nutrition  Implement sleep hygiene, limit night time interuptions, group activities  Avoid physical restraints  Use chemical restraint only when other efforts have failed, recommend zyprexa 2.5mg IM q8h prn  Monitor Qtc, if greater than 500 do not use antipsychotic medication  Most recent EKG from 7/29 QTC: 488  If QTc greater than 460, monitor and replete and deficiency of  K and Mg, recheck EKG  K 7/30: 3.2, 7/31: 4.0; 8/1: 4.1  Mg 7/31: 2.0, improved from 1.3 on 7/30     Paroxysmal Atrial Fibrillation  Presented to ED for dizziness, tachycardia  Had not taken medications d/t decreased appetite  Given IV Lopressor, Restarted on PTA medications,  "Toprol  mg BID and Diltiazem 240 mg once daily  No complaints of CP, dizziness,lightheadedness, palpitations upon exam today  Continue to monitor by primary care team    Subjective:     Jennifer Patel is an 89 year old female who is seen today for a geriatric follow up. She is laying in bed upon exam today. She is calm and pleasant. She states she is eating, has improved appetite from yesterday. Denies nausea today.       Review of Systems   Constitutional:  Negative for chills, fatigue and fever.   HENT:  Negative for congestion and sore throat.    Eyes:  Negative for pain and visual disturbance.   Respiratory:  Negative for cough, chest tightness and shortness of breath.    Cardiovascular:  Negative for chest pain, palpitations and leg swelling.   Gastrointestinal:  Positive for nausea (Nauseous this morning, but now relieved). Negative for abdominal pain and vomiting.   Genitourinary:  Negative for dysuria and hematuria.   Musculoskeletal:  Negative for arthralgias and back pain.   Skin:  Negative for color change and rash.   Neurological:  Negative for seizures and syncope.   All other systems reviewed and are negative.        Objective:     Vitals: Blood pressure 135/76, pulse 78, temperature 97.9 °F (36.6 °C), temperature source Oral, resp. rate 16, height 5' 3\" (1.6 m), weight 43.4 kg (95 lb 9.6 oz), SpO2 98%.,Body mass index is 16.93 kg/m².      Intake/Output Summary (Last 24 hours) at 8/1/2024 1459  Last data filed at 8/1/2024 1248  Gross per 24 hour   Intake 1070 ml   Output 75 ml   Net 995 ml       Current Medications: Reviewed    Physical Exam:   Physical Exam  Vitals and nursing note reviewed.   Constitutional:       General: She is not in acute distress.     Appearance: Normal appearance. She is well-developed.      Comments: Frail appearance   HENT:      Head: Normocephalic and atraumatic.      Right Ear: External ear normal.      Left Ear: External ear normal.      Nose: Nose normal.      " Mouth/Throat:      Mouth: Mucous membranes are dry.      Pharynx: Oropharynx is clear.   Eyes:      Conjunctiva/sclera: Conjunctivae normal.      Pupils: Pupils are equal, round, and reactive to light.   Cardiovascular:      Rate and Rhythm: Normal rate and regular rhythm.      Pulses: Normal pulses.      Heart sounds: Normal heart sounds. No murmur heard.  Pulmonary:      Effort: Pulmonary effort is normal. No respiratory distress.      Breath sounds: Normal breath sounds.   Abdominal:      General: Abdomen is flat. Bowel sounds are normal.      Palpations: Abdomen is soft.      Tenderness: There is no abdominal tenderness.      Comments: Cholecystostomy intact, no signs of infection, +draining   Musculoskeletal:         General: No swelling. Normal range of motion.      Cervical back: Normal range of motion and neck supple.   Skin:     General: Skin is warm and dry.      Capillary Refill: Capillary refill takes less than 2 seconds.   Neurological:      General: No focal deficit present.      Mental Status: She is alert. Mental status is at baseline.      Motor: Weakness (generalized) present.      Gait: Gait abnormal (use of RW).   Psychiatric:         Mood and Affect: Mood normal.         Behavior: Behavior normal.         Thought Content: Thought content normal.         Judgment: Judgment normal.          Invasive Devices       Peripheral Intravenous Line  Duration             Peripheral IV 07/30/24 Right Antecubital 1 day              Drain  Duration             Open Drain RLQ 2 days                    Lab, Imaging and other studies:    Lab Results:   I have personally reviewed pertinent lab results including the following:  - BMP    - I have personally reviewed pertinent reports.   and I have personally reviewed pertinent films in PACS

## 2024-08-01 NOTE — PROGRESS NOTES
[facility]  Progress Note  Name: Jennifer Patel I  MRN: [id number]  Unit/Bed#: [facility] 520-01 I Date of Admission: 7/29/2024   Date of Service: 8/1/2024 I Hospital Day: 3    Assessment & Plan   Unsteadiness on feet  Assessment & Plan  Unsteadiness on feet POA due to dizziness and weakness in the setting of inadequate PO intake a/e/b family reports patient is not getting out of bed, patient reporting dizziness and generalized weakness treated with geriatric medicine consultation, PT/OT evaluation/treatment, fall precautions, and assistance with transfers and ambulation.     Severe protein-calorie malnutrition (HCC)  Assessment & Plan  Malnutrition Findings:   Adult Malnutrition type: Chronic illness  Adult Degree of Malnutrition: Other severe protein calorie malnutrition  Malnutrition Characteristics: Fat loss, Muscle loss, Inadequate energy, Weight loss                  360 Statement: Chronic severe pro, ruiz malnutrition d/t condition, reported no appetite as evidence by <75% energy intake > 1 month, significant weight loss,  11/6/23 110lbs, 4/7/24 107lbs, 6/1/24 119lbs, 7/30/24 95lbs 12lbs/11.2% wt. loss x 3 months, 24lbs/20% wt. loss x 1 month, moderate to severe signs of muscle and fat loss at temples, orbitals, buccal region, BMI 16.9, currently on regular diet, on IVF, consider appetite stimulant, will add oral nutrition supplements (Ensure Plus BID, magic cup daily) to help maximize pro, ruiz intake, monitor for food preferences. If PO doesn't improve will need to consider nutrition support depending on plan of care.    BMI Findings:  Adult BMI Classifications: Underweight < 18.5        Body mass index is 16.93 kg/m².       Abnormal urinalysis  Assessment & Plan  Unclear if could be contributing to current picture  Try to add on urine cx if able  Observe off abx for now    Electrolyte abnormality  Assessment & Plan  Replete K and Mg  Monitor labs      Cholecystitis  Assessment & Plan  Discovered during hospitalization 5/2024 at [facility], s/p percutaneous cholecystostomy tube placement; appears patient was felt too high risk that time for surgical intervention.   CT abdomen/pelvis shows tube, no obstruction  Currently on amoxicillin following hospitalization earlier this month at [facility] for gram-negative bacteremia, continue through treatment course  Otherwise continue routine cholecystostomy care    Paroxysmal atrial fibrillation with RVR (HCC)  Assessment & Plan  ED arrival patient noted in rapid atrial fibrillation -160.  Patient complaining of dizziness/lightheadedness, admits that she had not taken medication at least for the past 1 day due to markedly decreased appetite  S/p initial 5 mg IV Lopressor with improvement in heart rates however with recurrent RVR on my evaluation.  Given additional 5 mg IV Lopressor, restart metoprolol succinate 100 mg twice daily and diltiazem 240 mg daily.    If recurrent RVR low threshold to obtain cardiology consultation  TSH slightly elevated, check FT4  Continue anticoagulation with Eliquis    Depressive disorder  Assessment & Plan  Mood appears slightly depressed per family, continue home dose sertraline  Geriatrics eval    Benign essential hypertension  Assessment & Plan  Blood pressure elevated on presentation, possibly due to missed medications as well as presentation and hospital  Continue diltiazem 240 mg daily, lisinopril 20 mg daily, and metoprolol succinate 100 mg twice daily with strict hold parameters and monitor blood pressure per protocol    * Failure to thrive in adult  Assessment & Plan  Patient presented with tachycardia/lightheadedness, along with ongoing low oral intake/progressive weight loss over the past 2 months of at least 15 pounds  Several recent hospitalizations at [facility] 5/2024 with similar picture found with acute cholecystitis now s/p percutaneous  "cholecystostomy as patient appears to have been felt to be too high surgical risk, additionally underwent EGD at that time reportedly without concerning findings.  Had subsequent hospitalization earlier this month at same facility with abdominal pain with nausea/vomiting found with leukocytosis and blood cultures with gram-negative bacteremia initially requiring IV antibiotics and transitioned to oral amoxicillin.  Unfortunately following discharge patient with ongoing poor appetite with minimal oral intake and worsening weakness/dizziness which prompted presentation now  With atrial fibrillation with RVR on arrival, management of this as per associated problem.  Labs otherwise without marked abnormalities compared to prior.  Family expressing concern with ongoing decline/weakness  CT abdomen/pelvis with collapsed gallbladder, alicja tube adjacent to the tip of the fundus, no obstruction, small bilateral pleural effusions, diverticulosis without colitis or diverticulitis   TSH/B12/folate acceptable  Nutrition services consult pending   Replete Mg and K, gentle IVF   Consult geriatrics, family feels she has \"given up\" and interested in possible medication to help improve appetite  Further workup as appropriate dependent on clinical course  Family expressed concern as above given ongoing decline/weakness, would like consideration for at least temporary SNF placement.  PT/OT evaluations and CM consult to assist with disposition               VTE Pharmacologic Prophylaxis: VTE Score: 5 Moderate Risk (Score 3-4) - Pharmacological DVT Prophylaxis Ordered: apixaban (Eliquis).    Mobility:   Basic Mobility Inpatient Raw Score: 8  JH-HLM Goal: 3: Sit at edge of bed  JH-HLM Achieved: 1: Laying in bed  JH-HLM Goal achieved. Continue to encourage appropriate mobility.    Patient Centered Rounds: I performed bedside rounds with nursing staff today.   Discussions with Specialists or Other Care Team Provider:     Education and " Discussions with Family / Patient: Attempted to update  (son and significant other) via phone. Unable to contact.    Total Time Spent on Date of Encounter in care of patient:  mins. This time was spent on one or more of the following: performing physical exam; counseling and coordination of care; obtaining or reviewing history; documenting in the medical record; reviewing/ordering tests, medications or procedures; communicating with other healthcare professionals and discussing with patient's family/caregivers.    Current Length of Stay: 3 day(s)  Current Patient Status: Inpatient   Certification Statement: The patient will continue to require additional inpatient hospital stay due to placement  Discharge Plan: Anticipate discharge in 24-48 hrs to snf    Code Status: Level 3 - DNAR and DNI    Subjective:   Patient denies billy cute complaints    Objective:     Vitals:   Temp (24hrs), Av.9 °F (36.6 °C), Min:97.4 °F (36.3 °C), Max:98.5 °F (36.9 °C)    Temp:  [97.4 °F (36.3 °C)-98.5 °F (36.9 °C)] 97.9 °F (36.6 °C)  HR:  [70-78] 78  Resp:  [14-18] 16  BP: (100-144)/(49-81) 135/76  SpO2:  [95 %-98 %] 98 %  Body mass index is 16.93 kg/m².     Input and Output Summary (last 24 hours):     Intake/Output Summary (Last 24 hours) at 2024 1345  Last data filed at 2024 1248  Gross per 24 hour   Intake 1070 ml   Output 75 ml   Net 995 ml       Physical Exam:   Physical Exam  Vitals and nursing note reviewed.   Constitutional:       General: She is not in acute distress.     Appearance: She is well-developed. She is not toxic-appearing or diaphoretic.   HENT:      Head: Normocephalic and atraumatic.   Eyes:      General: No scleral icterus.     Conjunctiva/sclera: Conjunctivae normal.   Cardiovascular:      Rate and Rhythm: Normal rate and regular rhythm.      Heart sounds: No murmur heard.     No friction rub. No gallop.   Pulmonary:      Effort: Pulmonary effort is normal. No respiratory distress.       Breath sounds: Normal breath sounds. No stridor. No wheezing, rhonchi or rales.   Chest:      Chest wall: No tenderness.   Abdominal:      General: There is no distension.      Palpations: Abdomen is soft. There is no mass.      Tenderness: There is no abdominal tenderness. There is no guarding or rebound.      Hernia: No hernia is present.      Comments: cholecystostomy   Musculoskeletal:         General: No swelling or tenderness.      Cervical back: Neck supple.   Skin:     General: Skin is warm and dry.      Capillary Refill: Capillary refill takes less than 2 seconds.   Neurological:      Mental Status: She is alert.      Motor: Weakness present.   Psychiatric:         Mood and Affect: Mood normal.          Additional Data:     Labs:  Results from last 7 days   Lab Units 07/31/24  0520   WBC Thousand/uL 8.06   HEMOGLOBIN g/dL 12.0   HEMATOCRIT % 36.6   PLATELETS Thousands/uL 232   SEGS PCT % 69   LYMPHO PCT % 20   MONO PCT % 6   EOS PCT % 3     Results from last 7 days   Lab Units 08/01/24  0253 07/31/24  0520   SODIUM mmol/L 135 139   POTASSIUM mmol/L 4.1 4.0   CHLORIDE mmol/L 100 103   CO2 mmol/L 29 29   BUN mg/dL 17 12   CREATININE mg/dL 0.54* 0.46*   ANION GAP mmol/L 6 7   CALCIUM mg/dL 8.5 8.2*   ALBUMIN g/dL  --  2.7*   TOTAL BILIRUBIN mg/dL  --  0.37   ALK PHOS U/L  --  37   ALT U/L  --  7   AST U/L  --  8*   GLUCOSE RANDOM mg/dL 106 123                 Results from last 7 days   Lab Units 07/29/24  1758   LACTIC ACID mmol/L 0.9       Lines/Drains:  Invasive Devices       Peripheral Intravenous Line  Duration             Peripheral IV 07/30/24 Right Antecubital 1 day              Drain  Duration             Open Drain RLQ 2 days                      Telemetry:  Telemetry Orders (From admission, onward)               24 Hour Telemetry Monitoring  Continuous x 24 Hours (Telem)        Question:  Reason for 24 Hour Telemetry  Answer:  Arrhythmias requiring acute medical intervention / PPM or ICD malfunction                               Imaging: No pertinent imaging reviewed.    Recent Cultures (last 7 days):   Results from last 7 days   Lab Units 07/30/24  1359   URINE CULTURE  50,000-59,000 cfu/ml Yeast*       Last 24 Hours Medication List:   Current Facility-Administered Medications   Medication Dose Route Frequency Provider Last Rate    acetaminophen  650 mg Oral Q6H PRN Abraham Holley, DO      amoxicillin  1,000 mg Oral Q8H GIANNA Abraham Holley, DO      apixaban  2.5 mg Oral BID Abraham Holley, DO      atorvastatin  40 mg Oral Daily With Dinner Abraham Holley, DO      calcium carbonate-vitamin D  1 tablet Oral BID With Meals Abraham Holley, DO      diltiazem  240 mg Oral Daily Abraham Holley, DO      fluticasone  2 spray Each Nare Daily Abraham Holley, DO      levothyroxine  88 mcg Oral Early Morning Abraham Holley, DO      lisinopril  20 mg Oral Daily Abraham Holley, DO      magnesium Oxide  400 mg Oral BID Geoffrey Alex,       metoprolol  5 mg Intravenous Q6H PRN Luly Dyer PA-C      metoprolol succinate  100 mg Oral BID Abraham Holley, DO      mirtazapine  15 mg Oral HS Geoffrey Alex,       ondansetron  4 mg Intravenous Q6H PRN Abraham Holley, DO      sertraline  50 mg Oral Daily Abraham Holley,           Today, Patient Was Seen By: Geoffrey Alex DO    **Please Note: This note may have been constructed using a voice recognition system.**

## 2024-08-01 NOTE — ASSESSMENT & PLAN NOTE
"Patient presented with tachycardia/lightheadedness, along with ongoing low oral intake/progressive weight loss over the past 2 months of at least 15 pounds  Several recent hospitalizations at [facility] 5/2024 with similar picture found with acute cholecystitis now s/p percutaneous cholecystostomy as patient appears to have been felt to be too high surgical risk, additionally underwent EGD at that time reportedly without concerning findings.  Had subsequent hospitalization earlier this month at same facility with abdominal pain with nausea/vomiting found with leukocytosis and blood cultures with gram-negative bacteremia initially requiring IV antibiotics and transitioned to oral amoxicillin.  Unfortunately following discharge patient with ongoing poor appetite with minimal oral intake and worsening weakness/dizziness which prompted presentation now  With atrial fibrillation with RVR on arrival, management of this as per associated problem.  Labs otherwise without marked abnormalities compared to prior.  Family expressing concern with ongoing decline/weakness  CT abdomen/pelvis with collapsed gallbladder, alicja tube adjacent to the tip of the fundus, no obstruction, small bilateral pleural effusions, diverticulosis without colitis or diverticulitis   TSH/B12/folate acceptable  Nutrition services consult pending   Replete Mg and K, gentle IVF   Consult geriatrics, family feels she has \"given up\" and interested in possible medication to help improve appetite  Further workup as appropriate dependent on clinical course  Family expressed concern as above given ongoing decline/weakness, would like consideration for at least temporary SNF placement.  PT/OT evaluations and CM consult to assist with disposition  "

## 2024-08-01 NOTE — PHYSICAL THERAPY NOTE
PT Treatment       08/01/24 1515   PT Last Visit   PT Visit Date 08/01/24   Note Type   Note Type Treatment   Pain Assessment   Pain Assessment Tool 0-10   Pain Score No Pain   Restrictions/Precautions   Other Precautions Bed Alarm;Cognitive;Hard of hearing;Fall Risk   General   Chart Reviewed Yes   Response to Previous Treatment Patient with no complaints from previous session.   Family/Caregiver Present No   Cognition   Arousal/Participation Alert;Cooperative   Attention Attends with cues to redirect   Following Commands Follows one step commands without difficulty   Subjective   Subjective patient reporting lightheadedness upon sitting EOB. subsided as seated.   Bed Mobility   Supine to Sit 3  Moderate assistance   Additional items Assist x 1;HOB elevated;Increased time required;Verbal cues;LE management   Sit to Supine 3  Moderate assistance   Additional items Assist x 1;Increased time required;Verbal cues;Other   Additional Comments increased time for performance of bed mobiltiy given patient's fraility and gross weakness. posterior lean seated EOB requiring mod-AX1 to maintain sitting x 10 minutes. post treatment patient supine in bed with assist from RN to boost and reposition in bed.   Balance   Static Sitting Poor   Endurance Deficit   Endurance Deficit Yes   Endurance Deficit Description gross weakness, O2 sats low 90s on RA   Activity Tolerance   Activity Tolerance Patient tolerated treatment well;Patient limited by fatigue   Nurse Made Aware dilip to see per RN Brittany   Exercises   Hip Abduction Sitting;10 reps;Bilateral;AROM   Knee AROM Long Arc Quad Sitting;10 reps;Bilateral;AROM   Ankle Pumps Sitting;20 reps;Bilateral;AROM   Marching Sitting;10 reps;Bilateral;AROM   Balance training  sitting EOB x 10 minutes with mod-AX1. VC to correct for posterior lean   Assessment   Prognosis Fair   Problem List Decreased strength;Decreased endurance;Impaired balance;Decreased mobility;Impaired hearing   Assessment  PT initiated treatment session in order to assist patient in achieving goals to improve sitting balance, LE strength, and overall activity tolerance. Patient able to participate in bed mobility with mod-AX1 and required assist to maintain sitting EOB x 10 minutes. She demonstrated good tolerance for performance of LE there-ex as demonstrated by performing reps to fatigue and without pain. Patient will continue to benefit from continued skilled PT this admission to achieve maximal function and safety.   Goals   Patient Goals agreeable to sit EOB   STG Expiration Date 08/14/24   Plan   Treatment/Interventions Functional transfer training;LE strengthening/ROM;Therapeutic exercise;Endurance training;Patient/family training;Bed mobility;Gait training;Equipment eval/education;OT;Spoke to nursing   Progress Slow progress, medical status limitations   PT Frequency 3-5x/wk   Discharge Recommendation   Rehab Resource Intensity Level, PT II (Moderate Resource Intensity)   AM-PAC Basic Mobility Inpatient   Turning in Flat Bed Without Bedrails 2   Lying on Back to Sitting on Edge of Flat Bed Without Bedrails 2   Moving Bed to Chair 2   Standing Up From Chair Using Arms 2   Walk in Room 2   Climb 3-5 Stairs With Railing 1   Basic Mobility Inpatient Raw Score 11   Basic Mobility Standardized Score 30.25   [facility] Highest Level Of Mobility   -HLM Goal 4: Move to chair/commode   -HLM Achieved 3: Sit at edge of bed     FABBY HENRIQUEZ PT, DPT

## 2024-08-01 NOTE — PLAN OF CARE
Problem: PHYSICAL THERAPY ADULT  Goal: Performs mobility at highest level of function for planned discharge setting.  See evaluation for individualized goals.  Description: Treatment/Interventions: Functional transfer training, LE strengthening/ROM, Therapeutic exercise, Endurance training, Patient/family training, Equipment eval/education, Bed mobility, Gait training, Spoke to nursing          See flowsheet documentation for full assessment, interventions and recommendations.  Outcome: Progressing  Note: Prognosis: Fair  Problem List: Decreased strength, Decreased endurance, Impaired balance, Decreased mobility, Impaired hearing  Assessment: PT initiated treatment session in order to assist patient in achieving goals to improve sitting balance, LE strength, and overall activity tolerance. Patient able to participate in bed mobility with mod-AX1 and required assist to maintain sitting EOB x 10 minutes. She demonstrated good tolerance for performance of LE there-ex as demonstrated by performing reps to fatigue and without pain. Patient will continue to benefit from continued skilled PT this admission to achieve maximal function and safety.  Barriers to Discharge: Inaccessible home environment, Decreased caregiver support     Rehab Resource Intensity Level, PT: II (Moderate Resource Intensity)    See flowsheet documentation for full assessment.

## 2024-08-02 VITALS
HEART RATE: 77 BPM | WEIGHT: 95.6 LBS | TEMPERATURE: 97.2 F | OXYGEN SATURATION: 97 % | SYSTOLIC BLOOD PRESSURE: 130 MMHG | BODY MASS INDEX: 16.94 KG/M2 | HEIGHT: 63 IN | RESPIRATION RATE: 14 BRPM | DIASTOLIC BLOOD PRESSURE: 78 MMHG

## 2024-08-02 LAB — BACTERIA UR CULT: ABNORMAL

## 2024-08-02 PROCEDURE — 97535 SELF CARE MNGMENT TRAINING: CPT

## 2024-08-02 PROCEDURE — 99239 HOSP IP/OBS DSCHRG MGMT >30: CPT | Performed by: INTERNAL MEDICINE

## 2024-08-02 RX ORDER — MIRTAZAPINE 15 MG/1
15 TABLET, FILM COATED ORAL
Qty: 90 TABLET | Refills: 0
Start: 2024-08-02

## 2024-08-02 RX ORDER — LISINOPRIL 20 MG/1
20 TABLET ORAL DAILY
Qty: 90 TABLET | Refills: 0
Start: 2024-08-03

## 2024-08-02 RX ADMIN — APIXABAN 2.5 MG: 2.5 TABLET, FILM COATED ORAL at 08:44

## 2024-08-02 RX ADMIN — SERTRALINE HYDROCHLORIDE 50 MG: 50 TABLET ORAL at 08:44

## 2024-08-02 RX ADMIN — AMOXICILLIN 1000 MG: 500 CAPSULE ORAL at 05:08

## 2024-08-02 RX ADMIN — Medication 1 TABLET: at 08:44

## 2024-08-02 RX ADMIN — METOPROLOL SUCCINATE 100 MG: 100 TABLET, EXTENDED RELEASE ORAL at 08:44

## 2024-08-02 RX ADMIN — LISINOPRIL 20 MG: 20 TABLET ORAL at 08:44

## 2024-08-02 RX ADMIN — MAGNESIUM OXIDE TAB 400 MG (241.3 MG ELEMENTAL MG) 400 MG: 400 (241.3 MG) TAB at 08:44

## 2024-08-02 RX ADMIN — LEVOTHYROXINE SODIUM 88 MCG: 88 TABLET ORAL at 05:08

## 2024-08-02 RX ADMIN — DILTIAZEM HYDROCHLORIDE 240 MG: 240 CAPSULE, COATED, EXTENDED RELEASE ORAL at 08:44

## 2024-08-02 NOTE — CASE MANAGEMENT
Case Management Discharge Planning Note    Patient name Jennifer Patel  Location [facility]-01 MRN [id number]  : 1935 Date 2024       Current Admission Date: 2024  Current Admission Diagnosis:Failure to thrive in adult   Patient Active Problem List    Diagnosis Date Noted Date Diagnosed    Electrolyte abnormality 2024     Abnormal urinalysis 2024     Severe protein-calorie malnutrition (HCC) 2024     Unsteadiness on feet 2024     Stage 3 chronic kidney disease (HCC) 2024     Failure to thrive in adult 2024     Cholecystitis 2024     Paroxysmal atrial fibrillation with RVR (HCC) 2023     Coronary artery disease involving native coronary artery of native heart without angina pectoris 10/19/2018     Depressive disorder 2008     Benign essential hypertension 2007       LOS (days): 4  Geometric Mean LOS (GMLOS) (days): 4.5  Days to GMLOS:0.8     OBJECTIVE:  Risk of Unplanned Readmission Score: 11.67         Current admission status: Inpatient   Preferred Pharmacy:   [facility] [place], [place] - [address]  [address]  [facility] [address]  Phone: [phone] Fax: [phone]    Primary Care Provider: Curtis Jackson MD    Primary Insurance: MEDICARE  Secondary Insurance: [employer]    DISCHARGE DETAILS:    Discharge planning discussed with:: Raymundo Zambrano  Freedom of Choice: Yes  Comments - Freedom of Choice: Agreeable to [facility] contacted family/caregiver?: Yes  Were Treatment Team discharge recommendations reviewed with patient/caregiver?: Yes  Did patient/caregiver verbalize understanding of patient care needs?: N/A- going to facility  Were patient/caregiver advised of the risks associated with not following Treatment Team discharge recommendations?: Yes    Contacts  Patient Contacts: Juan Manuel  Relationship to Patient:: Family  Contact Method: Phone  Phone Number:  [phone]  Reason/Outcome: Discharge Planning, Referral    Other Referral/Resources/Interventions Provided:  Interventions: Short Term Rehab, Transportation  Referral Comments: CM updated family that pt was accepted to [facility] and can go today per provider.  Juan Manuel Taylor agreeable.  CM arranged Stretcher Van transport for pt at 1400 with [facility].         Treatment Team Recommendation: Short Term Rehab  Discharge Destination Plan:: Short Term Rehab  Transport at Discharge : Stretcher van      IMM Given (Date):: 08/02/24        Additional Comments: CM spoke to Juan Manuel taylor confirmed he is agreeable to pt d/c to [facility].  CM called Juan Manuel back with trasnsport time of 1400.  CM reviewed IMM with Juan Manuel on the phone. CM updated RN and provider to  time.    Accepting Facility Name, City & State : [facility]  Receiving Facility/Agency Phone Number: report#[phone]  Facility/Agency Fax Number: fax#[phone]

## 2024-08-02 NOTE — PLAN OF CARE
Problem: OCCUPATIONAL THERAPY ADULT  Goal: Performs self-care activities at highest level of function for planned discharge setting.  See evaluation for individualized goals.  Description: Treatment Interventions: ADL retraining, Functional transfer training, UE strengthening/ROM, Endurance training, Patient/family training, Equipment evaluation/education, Compensatory technique education, Continued evaluation, Energy conservation, Activityengagement          See flowsheet documentation for full assessment, interventions and recommendations.   Note: Limitation: Decreased ADL status, Decreased Safe judgement during ADL, Decreased endurance, Decreased high-level ADLs, Decreased self-care trans  Prognosis: Fair  Assessment: Pt is a 88 yo female who participated in skilled OT session on 8/2/2024. Treatment focused to improve overall increased activity tolerance in ADL/IADL/leisure tasks. Upon arrival, pt found lying supine in bed and was agreeable to OT session. Pt performed rolling R <> L in supine position for proper positioning/comfort with Max A x1 and performed grooming tasks in supine position with Max A. Pt was very lethargic and minimally responsive throughout session with eyes remaining closed majority of session despite increased encouragement. At the end of the session, pt was left lying supine in bed with all functional needs in reach. Pt demonstrates gradual functional improvements towards OT goals however continues to be functioning below occupational baseline and is still limited by the following limitations/impairments which were addressed through skilled instruction: cognition, generalized weakness, balance, endurance/activity tolerance, postural/trunk control, strength, pain, and safety awareness. At this time, recommend discharge to post-acute rehab when medically stable.   The patient's raw score on the -PAC Daily Activity Inpatient Short Form is 9. A raw score of less than 19 suggests the  patient may benefit from discharge to post-acute rehabilitation services. Please refer to the recommendation of the Occupational Therapist for safe discharge planning.  Established OT goals will be continued 1-2x/wk to address acute care needs and underlying performance skills to maximize occupational performance and safety to return to PLOF.     Rehab Resource Intensity Level, OT: II (Moderate Resource Intensity)

## 2024-08-02 NOTE — PLAN OF CARE
Problem: Nutrition/Hydration-ADULT  Goal: Nutrient/Hydration intake appropriate for improving, restoring or maintaining nutritional needs  Description: Monitor and assess patient's nutrition/hydration status for malnutrition. Collaborate with interdisciplinary team and initiate plan and interventions as ordered.  Monitor patient's weight and dietary intake as ordered or per policy. Utilize nutrition screening tool and intervene as necessary. Determine patient's food preferences and provide high-protein, high-caloric foods as appropriate.     INTERVENTIONS:  - Monitor oral intake, urinary output, labs, and treatment plans  - Assess nutrition and hydration status and recommend course of action  - Evaluate amount of meals eaten  - Assist patient with eating if necessary   - Allow adequate time for meals  - Recommend/ encourage appropriate diets, oral nutritional supplements, and vitamin/mineral supplements  - Order, calculate, and assess calorie counts as needed  - Recommend, monitor, and adjust tube feedings and TPN/PPN based on assessed needs  - Assess need for intravenous fluids  - Provide specific nutrition/hydration education as appropriate  - Include patient/family/caregiver in decisions related to nutrition  Outcome: Progressing     Problem: Prexisting or High Potential for Compromised Skin Integrity  Goal: Skin integrity is maintained or improved  Description: INTERVENTIONS:  - Identify patients at risk for skin breakdown  - Assess and monitor skin integrity  - Assess and monitor nutrition and hydration status  - Monitor labs   - Assess for incontinence   - Turn and reposition patient  - Assist with mobility/ambulation  - Relieve pressure over bony prominences  - Avoid friction and shearing  - Provide appropriate hygiene as needed including keeping skin clean and dry  - Evaluate need for skin moisturizer/barrier cream  - Collaborate with interdisciplinary team   - Patient/family teaching  - Consider wound  care consult   Outcome: Progressing     Problem: PAIN - ADULT  Goal: Verbalizes/displays adequate comfort level or baseline comfort level  Description: Interventions:  - Encourage patient to monitor pain and request assistance  - Assess pain using appropriate pain scale  - Administer analgesics based on type and severity of pain and evaluate response  - Implement non-pharmacological measures as appropriate and evaluate response  - Consider cultural and social influences on pain and pain management  - Notify physician/advanced practitioner if interventions unsuccessful or patient reports new pain  Outcome: Progressing     Problem: INFECTION - ADULT  Goal: Absence or prevention of progression during hospitalization  Description: INTERVENTIONS:  - Assess and monitor for signs and symptoms of infection  - Monitor lab/diagnostic results  - Monitor all insertion sites, i.e. indwelling lines, tubes, and drains  - Monitor endotracheal if appropriate and nasal secretions for changes in amount and color  - [place] appropriate cooling/warming therapies per order  - Administer medications as ordered  - Instruct and encourage patient and family to use good hand hygiene technique  - Identify and instruct in appropriate isolation precautions for identified infection/condition  Outcome: Progressing  Goal: Absence of fever/infection during neutropenic period  Description: INTERVENTIONS:  - Monitor WBC    Outcome: Progressing     Problem: SAFETY ADULT  Goal: Patient will remain free of falls  Description: INTERVENTIONS:  - Educate patient/family on patient safety including physical limitations  - Instruct patient to call for assistance with activity   - Consult OT/PT to assist with strengthening/mobility   - Keep Call bell within reach  - Keep bed low and locked with side rails adjusted as appropriate  - Keep care items and personal belongings within reach  - Initiate and maintain comfort rounds  - Make Fall Risk Sign visible to  staff  - Offer Toileting every 2 Hours, in advance of need  - Initiate/Maintain \alarm  - Obtain necessary fall risk management equipment: \  - Apply yellow socks and bracelet for high fall risk patients  - Consider moving patient to room near nurses station  Outcome: Progressing  Goal: Maintain or return to baseline ADL function  Description: INTERVENTIONS:  -  Assess patient's ability to carry out ADLs; assess patient's baseline for ADL function and identify physical deficits which impact ability to perform ADLs (bathing, care of mouth/teeth, toileting, grooming, dressing, etc.)  - Assess/evaluate cause of self-care deficits   - Assess range of motion  - Assess patient's mobility; develop plan if impaired  - Assess patient's need for assistive devices and provide as appropriate  - Encourage maximum independence but intervene and supervise when necessary  - Involve family in performance of ADLs  - Assess for home care needs following discharge   - Consider OT consult to assist with ADL evaluation and planning for discharge  - Provide patient education as appropriate  Outcome: Progressing  Goal: Maintains/Returns to pre admission functional level  Description: INTERVENTIONS:  - Perform AM-PAC 6 Click Basic Mobility/ Daily Activity assessment daily.  - Set and communicate daily mobility goal to care team and patient/family/caregiver.   - Collaborate with rehabilitation services on mobility goals if consulted  - Perform Range of Motion 3 times a day.  - Reposition patient every 2 hours.  - Dangle patient 3 times a day  - Stand patient 3 times a day  - Ambulate patient 3 times a day  - Out of bed to chair 3 times a day   - Out of bed for meals 3 times a day  - Out of bed for toileting  - Record patient progress and toleration of activity level   Outcome: Progressing     Problem: DISCHARGE PLANNING  Goal: Discharge to home or other facility with appropriate resources  Description: INTERVENTIONS:  - Identify barriers to  discharge w/patient and caregiver  - Arrange for needed discharge resources and transportation as appropriate  - Identify discharge learning needs (meds, wound care, etc.)  - Arrange for interpretive services to assist at discharge as needed  - Refer to Case Management Department for coordinating discharge planning if the patient needs post-hospital services based on physician/advanced practitioner order or complex needs related to functional status, cognitive ability, or social support system  Outcome: Progressing     Problem: Knowledge Deficit  Goal: Patient/family/caregiver demonstrates understanding of disease process, treatment plan, medications, and discharge instructions  Description: Complete learning assessment and assess knowledge base.  Interventions:  - Provide teaching at level of understanding  - Provide teaching via preferred learning methods  Outcome: Progressing

## 2024-08-02 NOTE — OCCUPATIONAL THERAPY NOTE
Occupational Therapy Progress Note     Patient Name: Jennifer Patel  Today's Date: 8/2/2024  Problem List  Principal Problem:    Failure to thrive in adult  Active Problems:    Benign essential hypertension    Depressive disorder    Paroxysmal atrial fibrillation with RVR (HCC)    Cholecystitis    Electrolyte abnormality    Abnormal urinalysis    Severe protein-calorie malnutrition (HCC)    Unsteadiness on feet        08/02/24 1018   OT Last Visit   OT Visit Date 08/02/24   Note Type   Note Type Treatment   Pain Assessment   Pain Assessment Tool [facility] Pain Intervention(s) Repositioned;Ambulation/increased activity;Emotional support   Pain Rating: FLACC (Rest) - Face 0   Pain Rating: FLACC (Rest) - Legs 0   Pain Rating: FLACC (Rest) - Activity 0   Pain Rating: FLACC (Rest) - Cry 0   Pain Rating: FLACC (Rest) - Consolability 0   Score: FLACC (Rest) 0   Pain Rating: FLACC (Activity) - Face 0   Pain Rating: FLACC (Activity) - Legs 0   Pain Rating: FLACC (Activity) - Activity 0   Pain Rating: FLACC (Activity) - Cry 0   Pain Rating: FLACC (Activity) - Consolability 0   Score: FLACC (Activity) 0   Restrictions/Precautions   Weight Bearing Precautions Per Order No   Other Precautions Bed Alarm;Cognitive;Multiple lines;Fall Risk   Lifestyle   Autonomy PTA, pt has assistance with ADLs/IADLs and uses w/c mainly for functional mobility w/ occasional use of RW; (-) driving   Reciprocal Relationships Lives with her  and son   Service to Others Retired   Intrinsic Gratification Enjoys reading   ADL   Where Assessed Supine, bed   Grooming Assistance 2  Maximal Assistance   Grooming Deficit Setup;Verbal cueing;Supervision/safety;Increased time to complete;Brushing hair   Bed Mobility   Rolling R 2  Maximal assistance   Additional items Assist x 1;HOB elevated;Bedrails;Increased time required;Verbal cues;LE management   Rolling L 2  Maximal assistance   Additional items Assist x 1;HOB  "elevated;Bedrails;Increased time required;Verbal cues;LE management   Supine to Sit Unable to assess   Sit to Supine Unable to assess   Additional Comments Upon arrival, pt presenting very lethargic, minimally responsive, requiring max verbal/tactile cues to open eyes as pt's eyes remained closed majority of session; performed rolling R <> L in supine for proper positioning and comfort with Max A x1; @ end of session, pt left lying supine in bed with all functional needs in reach with bed alarm activated   Transfers   Sit to Stand Unable to assess   Stand to Sit Unable to assess   Additional Comments Not appropriate @ this time 2* safety; pt is very lethargic and minimally responsive   Subjective   Subjective \"I want to sleep.\"   Cognition   Overall Cognitive Status Impaired   Arousal/Participation Lethargic;Persistent stimuli required;Poorly responsive   Following Commands Follows one step commands with increased time or repetition   Comments Pt lethargic, flat affect; eyes remained closed majority of session, minimally responsive, reporting that she just wants to sleep   Activity Tolerance   Activity Tolerance Patient limited by fatigue   Medical Staff Made Aware RN cleared   Assessment   Assessment Pt is a 89 yo female who participated in skilled OT session on 8/2/2024. Treatment focused to improve overall increased activity tolerance in ADL/IADL/leisure tasks. Upon arrival, pt found lying supine in bed and was agreeable to OT session. Pt performed rolling R <> L in supine position for proper positioning/comfort with Max A x1 and performed grooming tasks in supine position with Max A. Pt was very lethargic and minimally responsive throughout session with eyes remaining closed majority of session despite increased encouragement. At the end of the session, pt was left lying supine in bed with all functional needs in reach. Pt demonstrates gradual functional improvements towards OT goals however continues to be " functioning below occupational baseline and is still limited by the following limitations/impairments which were addressed through skilled instruction: cognition, generalized weakness, balance, endurance/activity tolerance, postural/trunk control, strength, pain, and safety awareness. At this time, recommend discharge to post-acute rehab when medically stable.   The patient's raw score on the AM-PAC Daily Activity Inpatient Short Form is 9. A raw score of less than 19 suggests the patient may benefit from discharge to post-acute rehabilitation services. Please refer to the recommendation of the Occupational Therapist for safe discharge planning.  Established OT goals will be continued 1-2x/wk to address acute care needs and underlying performance skills to maximize occupational performance and safety to return to [facility].   Plan   Treatment Interventions ADL retraining;Functional transfer training;UE strengthening/ROM;Endurance training;Cognitive reorientation;Patient/family training;Equipment evaluation/education;Compensatory technique education;Energy conservation;Activityengagement;Continued evaluation   Goal Expiration Date 08/14/24   OT Treatment Day 1   OT Frequency 1-2x/wk  (Decreasing frequency 2* low activity tolerance)   Discharge Recommendation   Rehab Resource Intensity Level, OT II (Moderate Resource Intensity)   AM-PAC Daily Activity Inpatient   Lower Body Dressing 1   Bathing 1   Toileting 1   Upper Body Dressing 2   Grooming 2   Eating 2   Daily Activity Raw Score 9   Turning Head Towards Sound 2   Follow Simple Instructions 2   Low Function Daily Activity Raw Score 13   Low Function Daily Activity Standardized Score  23.16   AM-PAC Applied Cognition Inpatient   Following a Speech/Presentation 1   Understanding Ordinary Conversation 2   Taking Medications 1   Remembering Where Things Are Placed or Put Away 1   Remembering List of 4-5 Errands 1   Taking Care of Complicated Tasks 1   Applied Cognition Raw  Score 7   Applied Cognition Standardized Score 15.17   End of Consult   Education Provided Yes   Patient Position at End of Consult Supine;All needs within reach;Bed/Chair alarm activated   Nurse Communication Nurse aware of consult       Nat Reeder MS, OTR/L

## 2024-08-10 NOTE — ASSESSMENT & PLAN NOTE
"Patient presented with tachycardia/lightheadedness, along with ongoing low oral intake/progressive weight loss over the past 2 months of at least 15 pounds  Several recent hospitalizations at [facility] 5/2024 with similar picture found with acute cholecystitis now s/p percutaneous cholecystostomy as patient appears to have been felt to be too high surgical risk, additionally underwent EGD at that time reportedly without concerning findings.  Had subsequent hospitalization earlier this month at same facility with abdominal pain with nausea/vomiting found with leukocytosis and blood cultures with gram-negative bacteremia initially requiring IV antibiotics and transitioned to oral amoxicillin.  Unfortunately following discharge patient with ongoing poor appetite with minimal oral intake and worsening weakness/dizziness which prompted presentation now  With atrial fibrillation with RVR on arrival, management of this as per associated problem.  Labs otherwise without marked abnormalities compared to prior.  Family expressing concern with ongoing decline/weakness  CT abdomen/pelvis with collapsed gallbladder, alicja tube adjacent to the tip of the fundus, no obstruction, small bilateral pleural effusions, diverticulosis without colitis or diverticulitis   TSH/B12/folate acceptable  Nutrition services consult pending   Replete Mg and K, gentle IVF   Consult geriatrics, family feels she has \"given up\" and interested in possible medication to help improve appetite  Further workup as appropriate dependent on clinical course  Family expressed concern as above given ongoing decline/weakness, would like consideration for at least temporary SNF placement.  PT/OT evaluations and CM consult to assist with disposition  "

## 2024-08-10 NOTE — DISCHARGE SUMMARY
[facility]  Discharge- Jennifer Patel 1935, 89 y.o. female MRN: [id number]  Unit/Bed#: [facility] 520-01 Encounter: [id number]  Primary Care Provider: Curtis Jackson MD   Date and time admitted to hospital: 7/29/2024  4:34 PM    Unsteadiness on feet  Assessment & Plan  Unsteadiness on feet POA due to dizziness and weakness in the setting of inadequate PO intake a/e/b family reports patient is not getting out of bed, patient reporting dizziness and generalized weakness treated with geriatric medicine consultation, PT/OT evaluation/treatment, fall precautions, and assistance with transfers and ambulation.     Severe protein-calorie malnutrition (HCC)  Assessment & Plan  Malnutrition Findings:   Adult Malnutrition type: Chronic illness  Adult Degree of Malnutrition: Other severe protein calorie malnutrition  Malnutrition Characteristics: Fat loss, Muscle loss, Inadequate energy, Weight loss                  360 Statement: Chronic severe pro, ruiz malnutrition d/t condition, reported no appetite as evidence by <75% energy intake > 1 month, significant weight loss,  11/6/23 110lbs, 4/7/24 107lbs, 6/1/24 119lbs, 7/30/24 95lbs 12lbs/11.2% wt. loss x 3 months, 24lbs/20% wt. loss x 1 month, moderate to severe signs of muscle and fat loss at temples, orbitals, buccal region, BMI 16.9, currently on regular diet, on IVF, consider appetite stimulant, will add oral nutrition supplements (Ensure Plus BID, magic cup daily) to help maximize pro, ruiz intake, monitor for food preferences. If PO doesn't improve will need to consider nutrition support depending on plan of care.    BMI Findings:  Adult BMI Classifications: Underweight < 18.5        Body mass index is 16.93 kg/m².       Abnormal urinalysis  Assessment & Plan  Unclear if could be contributing to current picture  Try to add on urine cx if able  Observe off abx for now    Electrolyte abnormality  Assessment & Plan  Replete K and  Mg  Monitor labs     Cholecystitis  Assessment & Plan  Discovered during hospitalization 5/2024 at [facility], s/p percutaneous cholecystostomy tube placement; appears patient was felt too high risk that time for surgical intervention.   CT abdomen/pelvis shows tube, no obstruction  Currently on amoxicillin following hospitalization earlier this month at [facility] for gram-negative bacteremia, continue through treatment course  Otherwise continue routine cholecystostomy care    Paroxysmal atrial fibrillation with RVR (HCC)  Assessment & Plan  ED arrival patient noted in rapid atrial fibrillation -160.  Patient complaining of dizziness/lightheadedness, admits that she had not taken medication at least for the past 1 day due to markedly decreased appetite  S/p initial 5 mg IV Lopressor with improvement in heart rates however with recurrent RVR on my evaluation.  Given additional 5 mg IV Lopressor, restart metoprolol succinate 100 mg twice daily and diltiazem 240 mg daily.    If recurrent RVR low threshold to obtain cardiology consultation  TSH slightly elevated, check FT4  Continue anticoagulation with Eliquis    Depressive disorder  Assessment & Plan  Mood appears slightly depressed per family, continue home dose sertraline  Geriatrics eval    Benign essential hypertension  Assessment & Plan  Blood pressure elevated on presentation, possibly due to missed medications as well as presentation and hospital  Continue diltiazem 240 mg daily, lisinopril 20 mg daily, and metoprolol succinate 100 mg twice daily with strict hold parameters and monitor blood pressure per protocol    * Failure to thrive in adult  Assessment & Plan  Patient presented with tachycardia/lightheadedness, along with ongoing low oral intake/progressive weight loss over the past 2 months of at least 15 pounds  Several recent hospitalizations at [facility] 5/2024 with similar picture found with acute cholecystitis now s/p  "percutaneous cholecystostomy as patient appears to have been felt to be too high surgical risk, additionally underwent EGD at that time reportedly without concerning findings.  Had subsequent hospitalization earlier this month at same facility with abdominal pain with nausea/vomiting found with leukocytosis and blood cultures with gram-negative bacteremia initially requiring IV antibiotics and transitioned to oral amoxicillin.  Unfortunately following discharge patient with ongoing poor appetite with minimal oral intake and worsening weakness/dizziness which prompted presentation now  With atrial fibrillation with RVR on arrival, management of this as per associated problem.  Labs otherwise without marked abnormalities compared to prior.  Family expressing concern with ongoing decline/weakness  CT abdomen/pelvis with collapsed gallbladder, alicja tube adjacent to the tip of the fundus, no obstruction, small bilateral pleural effusions, diverticulosis without colitis or diverticulitis   TSH/B12/folate acceptable  Nutrition services consult pending   Replete Mg and K, gentle IVF   Consult geriatrics, family feels she has \"given up\" and interested in possible medication to help improve appetite  Further workup as appropriate dependent on clinical course  Family expressed concern as above given ongoing decline/weakness, would like consideration for at least temporary SNF placement.  PT/OT evaluations and CM consult to assist with disposition        Medical Problems       Resolved Problems  Date Reviewed: 8/10/2024   None       Discharging Physician / Practitioner: Geoffrey Alex DO  PCP: Curtis Jackson MD  Admission Date:   Admission Orders (From admission, onward)       Ordered        07/29/24 2034  INPATIENT ADMISSION  Once                          Discharge Date: 08/2/24        Reason for Admission:     Hospital Course:   Jennifer Patel is a 89 y.o. female patient who originally presented to the hospital on " "7/29/2024 due to failure to thive. She was started on remeron with some improvement in her diet. She was noted to be in afib rvr initially in the er with impoved with fluids and metoprolol.              Please see above list of diagnoses and related plan for additional information.     Condition at Discharge: stable    Discharge Day Visit / Exam:   Subjective:  patient denies any acute complaints  Vitals: Blood Pressure: 130/78 (08/02/24 1037)  Pulse: 77 (08/02/24 1037)  Temperature: (!) 97.2 °F (36.2 °C) (08/02/24 1037)  Temp Source: Axillary (08/02/24 0230)  Respirations: 14 (08/02/24 1037)  Height: 5' 3\" (160 cm) (07/30/24 1045)  Weight - Scale: 43.4 kg (95 lb 9.6 oz) (07/30/24 1045)  SpO2: 97 % (08/02/24 1037)  Exam:   Physical Exam  Vitals and nursing note reviewed.   Constitutional:       General: She is not in acute distress.     Appearance: She is well-developed. She is not toxic-appearing or diaphoretic.   HENT:      Head: Normocephalic and atraumatic.   Eyes:      General: No scleral icterus.     Conjunctiva/sclera: Conjunctivae normal.   Cardiovascular:      Rate and Rhythm: Normal rate and regular rhythm.      Heart sounds: No murmur heard.     No friction rub. No gallop.   Pulmonary:      Effort: Pulmonary effort is normal. No respiratory distress.      Breath sounds: Normal breath sounds. No stridor. No wheezing, rhonchi or rales.   Chest:      Chest wall: No tenderness.   Abdominal:      General: There is no distension.      Palpations: Abdomen is soft. There is no mass.      Tenderness: There is no abdominal tenderness. There is no guarding or rebound.      Hernia: No hernia is present.      Comments: cholecystostomy   Musculoskeletal:         General: No swelling or tenderness.      Cervical back: Neck supple.   Skin:     General: Skin is warm and dry.      Capillary Refill: Capillary refill takes less than 2 seconds.   Neurological:      Mental Status: She is alert.      Motor: Weakness present. "   Psychiatric:         Mood and Affect: Mood normal.          Discussion with Family: Updated  (son) via phone.    Discharge instructions/Information to patient and family:   See after visit summary for information provided to patient and family.      Provisions for Follow-Up Care:  See after visit summary for information related to follow-up care and any pertinent home health orders.      Mobility at time of Discharge:   Basic Mobility Inpatient Raw Score: 11  JH-HLM Goal: 4: Move to chair/commode  JH-HLM Achieved: 3: Sit at edge of bed  HLM Goal achieved. Continue to encourage appropriate mobility.     Disposition:   Home    Planned Readmission: no     Discharge Statement:  I spent  minutes discharging the patient. This time was spent on the day of discharge. I had direct contact with the patient on the day of discharge. Greater than 50% of the total time was spent examining patient, answering all patient questions, arranging and discussing plan of care with patient as well as directly providing post-discharge instructions.  Additional time then spent on discharge activities.    Discharge Medications:  See after visit summary for reconciled discharge medications provided to patient and/or family.      **Please Note: This note may have been constructed using a voice recognition system**

## 2024-08-10 NOTE — ASSESSMENT & PLAN NOTE
[facility] s/p percutaneous cholecystostomy tube placement; appears patient was felt too high risk that time for surgical intervention.   CT abdomen/pelvis shows tube, no obstruction  Currently on amoxicillin following hospitalization earlier this month at [facility] for gram-negative bacteremia, continue through treatment course  Otherwise continue routine cholecystostomy care

## 2024-08-12 ENCOUNTER — APPOINTMENT (EMERGENCY)
Dept: RADIOLOGY | Facility: HOSPITAL | Age: 89
DRG: 177 | End: 2024-08-12
Payer: MEDICARE

## 2024-08-12 ENCOUNTER — HOSPITAL ENCOUNTER (INPATIENT)
Facility: HOSPITAL | Age: 89
LOS: 5 days | Discharge: NURSING, REHAB OR LONG-TERM CARE | DRG: 177 | End: 2024-08-17
Attending: EMERGENCY MEDICINE | Admitting: INTERNAL MEDICINE
Payer: MEDICARE

## 2024-08-12 DIAGNOSIS — E03.9 HYPOTHYROIDISM: ICD-10-CM

## 2024-08-12 DIAGNOSIS — J81.1 PULMONARY EDEMA: ICD-10-CM

## 2024-08-12 DIAGNOSIS — E78.5 HYPERLIPIDEMIA: ICD-10-CM

## 2024-08-12 DIAGNOSIS — U07.1 COVID-19: Primary | ICD-10-CM

## 2024-08-12 DIAGNOSIS — I48.91 ATRIAL FIBRILLATION, UNSPECIFIED TYPE (HCC): ICD-10-CM

## 2024-08-12 DIAGNOSIS — R19.5 GUAIAC POSITIVE STOOLS: ICD-10-CM

## 2024-08-12 DIAGNOSIS — I10 HYPERTENSION: ICD-10-CM

## 2024-08-12 DIAGNOSIS — R91.8 PULMONARY INFILTRATE: ICD-10-CM

## 2024-08-12 DIAGNOSIS — R11.2 NAUSEA AND VOMITING: ICD-10-CM

## 2024-08-12 PROBLEM — D69.6 THROMBOCYTOPENIA (HCC): Status: ACTIVE | Noted: 2024-08-12

## 2024-08-12 PROBLEM — R79.89 ELEVATED TROPONIN: Status: ACTIVE | Noted: 2024-08-12

## 2024-08-12 PROBLEM — N30.90 CYSTITIS: Status: ACTIVE | Noted: 2024-08-12

## 2024-08-12 PROBLEM — K59.00 CONSTIPATION: Status: ACTIVE | Noted: 2024-08-12

## 2024-08-12 PROBLEM — D64.9 ANEMIA: Status: ACTIVE | Noted: 2024-08-12

## 2024-08-12 LAB
2HR DELTA HS TROPONIN: -35 NG/L
4HR DELTA HS TROPONIN: -40 NG/L
ABO GROUP BLD: NORMAL
ABO GROUP BLD: NORMAL
ALBUMIN SERPL BCG-MCNC: 2.7 G/DL (ref 3.5–5)
ALP SERPL-CCNC: 58 U/L (ref 34–104)
ALT SERPL W P-5'-P-CCNC: 20 U/L (ref 7–52)
ANION GAP SERPL CALCULATED.3IONS-SCNC: 4 MMOL/L (ref 4–13)
APTT PPP: 36 SECONDS (ref 23–34)
AST SERPL W P-5'-P-CCNC: 13 U/L (ref 13–39)
ATRIAL RATE: 70 BPM
BACTERIA UR QL AUTO: ABNORMAL /HPF
BASOPHILS # BLD AUTO: 0.02 THOUSANDS/ÂΜL (ref 0–0.1)
BASOPHILS NFR BLD AUTO: 0 % (ref 0–1)
BILIRUB SERPL-MCNC: 0.38 MG/DL (ref 0.2–1)
BILIRUB UR QL STRIP: NEGATIVE
BLD GP AB SCN SERPL QL: NEGATIVE
BUDDING YEAST: PRESENT
BUN SERPL-MCNC: 19 MG/DL (ref 5–25)
CALCIUM ALBUM COR SERPL-MCNC: 9.2 MG/DL (ref 8.3–10.1)
CALCIUM SERPL-MCNC: 8.2 MG/DL (ref 8.4–10.2)
CARDIAC TROPONIN I PNL SERPL HS: 109 NG/L
CARDIAC TROPONIN I PNL SERPL HS: 69 NG/L
CARDIAC TROPONIN I PNL SERPL HS: 74 NG/L
CHLORIDE SERPL-SCNC: 101 MMOL/L (ref 96–108)
CLARITY UR: ABNORMAL
CO2 SERPL-SCNC: 29 MMOL/L (ref 21–32)
COLOR UR: ABNORMAL
CREAT SERPL-MCNC: 0.52 MG/DL (ref 0.6–1.3)
EOSINOPHIL # BLD AUTO: 0 THOUSAND/ÂΜL (ref 0–0.61)
EOSINOPHIL NFR BLD AUTO: 0 % (ref 0–6)
ERYTHROCYTE [DISTWIDTH] IN BLOOD BY AUTOMATED COUNT: 15.8 % (ref 11.6–15.1)
FLUAV RNA RESP QL NAA+PROBE: NEGATIVE
FLUBV RNA RESP QL NAA+PROBE: NEGATIVE
GFR SERPL CREATININE-BSD FRML MDRD: 85 ML/MIN/1.73SQ M
GLUCOSE SERPL-MCNC: 110 MG/DL (ref 65–140)
GLUCOSE SERPL-MCNC: 112 MG/DL (ref 65–140)
GLUCOSE UR STRIP-MCNC: NEGATIVE MG/DL
HCT VFR BLD AUTO: 26.8 % (ref 34.8–46.1)
HGB BLD-MCNC: 8.7 G/DL (ref 11.5–15.4)
HGB UR QL STRIP.AUTO: ABNORMAL
IMM GRANULOCYTES # BLD AUTO: 0.02 THOUSAND/UL (ref 0–0.2)
IMM GRANULOCYTES NFR BLD AUTO: 0 % (ref 0–2)
INR PPP: 1.6 (ref 0.85–1.19)
KETONES UR STRIP-MCNC: NEGATIVE MG/DL
LACTATE SERPL-SCNC: 1 MMOL/L (ref 0.5–2)
LEUKOCYTE ESTERASE UR QL STRIP: ABNORMAL
LYMPHOCYTES # BLD AUTO: 0.79 THOUSANDS/ÂΜL (ref 0.6–4.47)
LYMPHOCYTES NFR BLD AUTO: 13 % (ref 14–44)
MCH RBC QN AUTO: 31.4 PG (ref 26.8–34.3)
MCHC RBC AUTO-ENTMCNC: 32.5 G/DL (ref 31.4–37.4)
MCV RBC AUTO: 97 FL (ref 82–98)
MONOCYTES # BLD AUTO: 0.33 THOUSAND/ÂΜL (ref 0.17–1.22)
MONOCYTES NFR BLD AUTO: 5 % (ref 4–12)
MUCOUS THREADS UR QL AUTO: ABNORMAL
NEUTROPHILS # BLD AUTO: 4.99 THOUSANDS/ÂΜL (ref 1.85–7.62)
NEUTS SEG NFR BLD AUTO: 82 % (ref 43–75)
NITRITE UR QL STRIP: NEGATIVE
NON-SQ EPI CELLS URNS QL MICRO: ABNORMAL /HPF
NRBC BLD AUTO-RTO: 0 /100 WBCS
P AXIS: 73 DEGREES
PH UR STRIP.AUTO: 6 [PH]
PLATELET # BLD AUTO: 117 THOUSANDS/UL (ref 149–390)
PMV BLD AUTO: 10.9 FL (ref 8.9–12.7)
POTASSIUM SERPL-SCNC: 4.2 MMOL/L (ref 3.5–5.3)
PR INTERVAL: 176 MS
PROCALCITONIN SERPL-MCNC: 0.16 NG/ML
PROT SERPL-MCNC: 5.2 G/DL (ref 6.4–8.4)
PROT UR STRIP-MCNC: ABNORMAL MG/DL
PROTHROMBIN TIME: 19.3 SECONDS (ref 12.3–15)
QRS AXIS: 67 DEGREES
QRSD INTERVAL: 68 MS
QT INTERVAL: 414 MS
QTC INTERVAL: 447 MS
RBC # BLD AUTO: 2.77 MILLION/UL (ref 3.81–5.12)
RBC #/AREA URNS AUTO: ABNORMAL /HPF
RH BLD: POSITIVE
RH BLD: POSITIVE
RSV RNA RESP QL NAA+PROBE: NEGATIVE
SARS-COV-2 RNA RESP QL NAA+PROBE: POSITIVE
SODIUM SERPL-SCNC: 134 MMOL/L (ref 135–147)
SP GR UR STRIP.AUTO: 1.02 (ref 1–1.03)
SPECIMEN EXPIRATION DATE: NORMAL
T WAVE AXIS: 52 DEGREES
TSH SERPL DL<=0.05 MIU/L-ACNC: 2.28 UIU/ML (ref 0.45–4.5)
UROBILINOGEN UR STRIP-ACNC: <2 MG/DL
VENTRICULAR RATE: 70 BPM
WBC # BLD AUTO: 6.15 THOUSAND/UL (ref 4.31–10.16)
WBC #/AREA URNS AUTO: ABNORMAL /HPF

## 2024-08-12 PROCEDURE — 81001 URINALYSIS AUTO W/SCOPE: CPT

## 2024-08-12 PROCEDURE — 80053 COMPREHEN METABOLIC PANEL: CPT

## 2024-08-12 PROCEDURE — 87086 URINE CULTURE/COLONY COUNT: CPT

## 2024-08-12 PROCEDURE — 87040 BLOOD CULTURE FOR BACTERIA: CPT

## 2024-08-12 PROCEDURE — 84484 ASSAY OF TROPONIN QUANT: CPT

## 2024-08-12 PROCEDURE — 99285 EMERGENCY DEPT VISIT HI MDM: CPT

## 2024-08-12 PROCEDURE — 0241U HB NFCT DS VIR RESP RNA 4 TRGT: CPT

## 2024-08-12 PROCEDURE — 85730 THROMBOPLASTIN TIME PARTIAL: CPT

## 2024-08-12 PROCEDURE — 82948 REAGENT STRIP/BLOOD GLUCOSE: CPT

## 2024-08-12 PROCEDURE — 36415 COLL VENOUS BLD VENIPUNCTURE: CPT

## 2024-08-12 PROCEDURE — 86900 BLOOD TYPING SEROLOGIC ABO: CPT

## 2024-08-12 PROCEDURE — 93010 ELECTROCARDIOGRAM REPORT: CPT | Performed by: INTERNAL MEDICINE

## 2024-08-12 PROCEDURE — 86901 BLOOD TYPING SEROLOGIC RH(D): CPT

## 2024-08-12 PROCEDURE — 84443 ASSAY THYROID STIM HORMONE: CPT

## 2024-08-12 PROCEDURE — 74177 CT ABD & PELVIS W/CONTRAST: CPT

## 2024-08-12 PROCEDURE — 87106 FUNGI IDENTIFICATION YEAST: CPT

## 2024-08-12 PROCEDURE — 86850 RBC ANTIBODY SCREEN: CPT

## 2024-08-12 PROCEDURE — 83605 ASSAY OF LACTIC ACID: CPT

## 2024-08-12 PROCEDURE — 99223 1ST HOSP IP/OBS HIGH 75: CPT | Performed by: STUDENT IN AN ORGANIZED HEALTH CARE EDUCATION/TRAINING PROGRAM

## 2024-08-12 PROCEDURE — 99285 EMERGENCY DEPT VISIT HI MDM: CPT | Performed by: EMERGENCY MEDICINE

## 2024-08-12 PROCEDURE — 84145 PROCALCITONIN (PCT): CPT

## 2024-08-12 PROCEDURE — 93005 ELECTROCARDIOGRAM TRACING: CPT

## 2024-08-12 PROCEDURE — 71045 X-RAY EXAM CHEST 1 VIEW: CPT

## 2024-08-12 PROCEDURE — 96360 HYDRATION IV INFUSION INIT: CPT

## 2024-08-12 PROCEDURE — 85027 COMPLETE CBC AUTOMATED: CPT | Performed by: STUDENT IN AN ORGANIZED HEALTH CARE EDUCATION/TRAINING PROGRAM

## 2024-08-12 PROCEDURE — 85610 PROTHROMBIN TIME: CPT

## 2024-08-12 PROCEDURE — 70450 CT HEAD/BRAIN W/O DYE: CPT

## 2024-08-12 PROCEDURE — 85025 COMPLETE CBC W/AUTO DIFF WBC: CPT

## 2024-08-12 RX ORDER — FLUTICASONE PROPIONATE 50 MCG
1 SPRAY, SUSPENSION (ML) NASAL 2 TIMES DAILY
Status: DISCONTINUED | OUTPATIENT
Start: 2024-08-12 | End: 2024-08-17 | Stop reason: HOSPADM

## 2024-08-12 RX ORDER — POLYETHYLENE GLYCOL 3350 17 G/17G
17 POWDER, FOR SOLUTION ORAL DAILY PRN
Status: DISCONTINUED | OUTPATIENT
Start: 2024-08-12 | End: 2024-08-17 | Stop reason: HOSPADM

## 2024-08-12 RX ORDER — ATORVASTATIN CALCIUM 40 MG/1
40 TABLET, FILM COATED ORAL DAILY
Status: DISCONTINUED | OUTPATIENT
Start: 2024-08-13 | End: 2024-08-17 | Stop reason: HOSPADM

## 2024-08-12 RX ORDER — METOPROLOL SUCCINATE 100 MG/1
100 TABLET, EXTENDED RELEASE ORAL 2 TIMES DAILY
Status: DISCONTINUED | OUTPATIENT
Start: 2024-08-12 | End: 2024-08-17 | Stop reason: HOSPADM

## 2024-08-12 RX ORDER — LEVOTHYROXINE SODIUM 88 UG/1
88 TABLET ORAL
Status: DISCONTINUED | OUTPATIENT
Start: 2024-08-13 | End: 2024-08-17 | Stop reason: HOSPADM

## 2024-08-12 RX ORDER — DOCUSATE SODIUM 100 MG/1
100 CAPSULE, LIQUID FILLED ORAL 2 TIMES DAILY
Status: DISCONTINUED | OUTPATIENT
Start: 2024-08-12 | End: 2024-08-17 | Stop reason: HOSPADM

## 2024-08-12 RX ORDER — MIRTAZAPINE 15 MG/1
15 TABLET, FILM COATED ORAL
Status: DISCONTINUED | OUTPATIENT
Start: 2024-08-12 | End: 2024-08-17 | Stop reason: HOSPADM

## 2024-08-12 RX ORDER — LANOLIN ALCOHOL/MO/W.PET/CERES
1 CREAM (GRAM) TOPICAL EVERY OTHER DAY
Status: DISCONTINUED | OUTPATIENT
Start: 2024-08-12 | End: 2024-08-17 | Stop reason: HOSPADM

## 2024-08-12 RX ORDER — LISINOPRIL 20 MG/1
20 TABLET ORAL DAILY
Status: DISCONTINUED | OUTPATIENT
Start: 2024-08-13 | End: 2024-08-17 | Stop reason: HOSPADM

## 2024-08-12 RX ORDER — SODIUM CHLORIDE, SODIUM GLUCONATE, SODIUM ACETATE, POTASSIUM CHLORIDE, MAGNESIUM CHLORIDE, SODIUM PHOSPHATE, DIBASIC, AND POTASSIUM PHOSPHATE .53; .5; .37; .037; .03; .012; .00082 G/100ML; G/100ML; G/100ML; G/100ML; G/100ML; G/100ML; G/100ML
75 INJECTION, SOLUTION INTRAVENOUS CONTINUOUS
Status: DISCONTINUED | OUTPATIENT
Start: 2024-08-12 | End: 2024-08-13

## 2024-08-12 RX ORDER — ONDANSETRON 2 MG/ML
4 INJECTION INTRAMUSCULAR; INTRAVENOUS ONCE
Status: DISCONTINUED | OUTPATIENT
Start: 2024-08-12 | End: 2024-08-17 | Stop reason: HOSPADM

## 2024-08-12 RX ORDER — DILTIAZEM HYDROCHLORIDE 240 MG/1
240 CAPSULE, COATED, EXTENDED RELEASE ORAL DAILY
Status: DISCONTINUED | OUTPATIENT
Start: 2024-08-13 | End: 2024-08-17 | Stop reason: HOSPADM

## 2024-08-12 RX ADMIN — METOPROLOL SUCCINATE 100 MG: 100 TABLET, EXTENDED RELEASE ORAL at 22:47

## 2024-08-12 RX ADMIN — CEFTRIAXONE SODIUM 1000 MG: 10 INJECTION, POWDER, FOR SOLUTION INTRAVENOUS at 18:45

## 2024-08-12 RX ADMIN — REMDESIVIR 200 MG: 100 INJECTION, POWDER, LYOPHILIZED, FOR SOLUTION INTRAVENOUS at 20:30

## 2024-08-12 RX ADMIN — SODIUM CHLORIDE 500 ML: 0.9 INJECTION, SOLUTION INTRAVENOUS at 15:56

## 2024-08-12 RX ADMIN — MIRTAZAPINE 15 MG: 15 TABLET, FILM COATED ORAL at 22:46

## 2024-08-12 RX ADMIN — SODIUM CHLORIDE, SODIUM GLUCONATE, SODIUM ACETATE, POTASSIUM CHLORIDE, MAGNESIUM CHLORIDE, SODIUM PHOSPHATE, DIBASIC, AND POTASSIUM PHOSPHATE 75 ML/HR: .53; .5; .37; .037; .03; .012; .00082 INJECTION, SOLUTION INTRAVENOUS at 19:56

## 2024-08-12 RX ADMIN — Medication 1 TABLET: at 20:20

## 2024-08-12 RX ADMIN — IOHEXOL 85 ML: 350 INJECTION, SOLUTION INTRAVENOUS at 16:16

## 2024-08-12 RX ADMIN — DOCUSATE SODIUM 100 MG: 100 CAPSULE, LIQUID FILLED ORAL at 20:20

## 2024-08-12 NOTE — ASSESSMENT & PLAN NOTE
Troponin in the ED elevated to 109->74  Patient without chest pain and EKG does not appear to show any acute ischemic appearing changes  Suspect this is some demand ischemia in the setting of COVID infection  Monitor on telemetry for 24 hours  Holding eliquis due to anemia, continue to monitor

## 2024-08-12 NOTE — ASSESSMENT & PLAN NOTE
Rate controlled on diltiazem and toprol XL  Holding eliquis for anemia and stool hemoccult positive

## 2024-08-12 NOTE — ASSESSMENT & PLAN NOTE
Patient noted to have hemoglobin 8.7 on admission, this is noted to be reduced from recent blood work from around 2 days ago with hemoglobins ranging from 10-12.  Care everywhere has a CBC ordered from [facility] showing hgb 8.6 on 8/9  Fortunately likely less concerning for acute bleed  Patient noted to be Hemoccult positive in the ED, CT showing constipation with large stool burden  Hold Eliquis in the setting of anemia, at present suspect that she may have some GI blood loss possibly in the form hemorrhoid, fissure, or stercoral ulcer in the setting of large stool burden noted on CT  Urinalysis and Urine micro does note large blood,, however after Akins was placed, urine does not appear to be grossly bloody.  Suspect that anemia secondary to gross hematuria would be more clinically apparent, and patient seems like a reasonable enough historian to notice this  Noted mildly elevated coag times and thrombocytopenia, patient appears clinically stable and no bleeding from IV sites or oral mucosa which otherwise may be concerning for TMA.  Continue to monitor

## 2024-08-12 NOTE — ASSESSMENT & PLAN NOTE
Platelet 117, appears lower than previously recorded plt counts from last hospitalization which was in the range of 200s-230s   CBC 3 days before admission in [facility] network noted plt count 121  Patient was recently hospitalized at our facility from 7/29-8 2, however it does not appear as though she received heparin products because she is on apixaban  Suspect this is consumptive in the setting of of COVID and possibly cystitis.  Low concern for TMA/DIC, however will continue to monitor with repeat coags and CBC in the morning

## 2024-08-12 NOTE — ASSESSMENT & PLAN NOTE
CT demonstrating constipation with large stool burden  Ordered bowel regimen with MiraLAX, soapsuds enema, and docusate for medical therapy

## 2024-08-12 NOTE — ASSESSMENT & PLAN NOTE
Likely secondary to COVID-19 infection   Zofran as needed, gentle hydration will be ordered  Cholecystostomy tube appears to be functioning properly, no abdominal tenderness

## 2024-08-12 NOTE — ASSESSMENT & PLAN NOTE
Urinalysis was concern for possible infection and CT showing thickened bladder wall concerning for cystitis  Akins placed in ED  Cover with Rocephin and monitor blood and urine cultures

## 2024-08-12 NOTE — ASSESSMENT & PLAN NOTE
Lab Results   Component Value Date    EGFR 85 08/12/2024    EGFR 86 08/09/2024    EGFR 84 08/01/2024    CREATININE 0.52 (L) 08/12/2024    CREATININE 0.60 08/09/2024    CREATININE 0.54 (L) 08/01/2024   Renal function appears baseline, continue to monitor  Repeat BMP in the morning

## 2024-08-12 NOTE — QUICK NOTE
Attempted to contact son Juan Manuel who is listed in chart under the emergency contact.  The gentleman who picked up the phone informed me that he is not this patient's son and in fact has never met the patient before.  The wrong phone number must have been entered into the emergency contact list.  I will delete the phone number so as to avoid any further miscommunication.  Interestingly, Misty Ames apparently also has this gentleman listed as the patient's emergency contact and he states he gets phone calls from them as well.    I then tried to update  John at number provided in chart and received voicemail twice.  Thus I have been unable to contact family for update, perhaps case management may be able to assist in contacting patient's son in the morning.

## 2024-08-12 NOTE — H&P
[facility]  H&P  Name: Jennifer Patel 89 y.o. female I MRN: [id number]  Unit/Bed#: [facility] I Date of Admission: 8/12/2024   Date of Service: 8/12/2024 I Hospital Day: 0      Assessment & Plan   Elevated troponin  Assessment & Plan  Troponin in the ED elevated to 109->74  Patient without chest pain and EKG does not appear to show any acute ischemic appearing changes  Suspect this is some demand ischemia in the setting of COVID infection  Monitor on telemetry for 24 hours  Holding eliquis due to anemia, continue to monitor    Cystitis  Assessment & Plan  Urinalysis was concern for possible infection and CT showing thickened bladder wall concerning for cystitis  Akins placed in ED  Cover with Rocephin and monitor blood and urine cultures    Nausea and vomiting  Assessment & Plan  Likely secondary to COVID-19 infection   Zofran as needed, gentle hydration will be ordered  Cholecystostomy tube appears to be functioning properly, no abdominal tenderness    Thrombocytopenia (HCC)  Assessment & Plan  Platelet 117, appears lower than previously recorded plt counts from last hospitalization which was in the range of 200s-230s   CBC 3 days before admission in [facility] network noted plt count 121  Patient was recently hospitalized at our facility from 7/29-8 2, however it does not appear as though she received heparin products because she is on apixaban  Suspect this is consumptive in the setting of of COVID and possibly cystitis.  Low concern for TMA/DIC, however will continue to monitor with repeat coags and CBC in the morning    Anemia  Assessment & Plan  Patient noted to have hemoglobin 8.7 on admission, this is noted to be reduced from recent blood work from around 2 days ago with hemoglobins ranging from 10-12.  Care everywhere has a CBC ordered from [facility] showing hgb 8.6 on 8/9  Fortunately likely less concerning for acute bleed  Patient noted to be Hemoccult positive in the ED, CT  showing constipation with large stool burden  Hold Eliquis in the setting of anemia, at present suspect that she may have some GI blood loss possibly in the form hemorrhoid, fissure, or stercoral ulcer in the setting of large stool burden noted on CT  Urinalysis and Urine micro does note large blood,, however after Akins was placed, urine does not appear to be grossly bloody.  Suspect that anemia secondary to gross hematuria would be more clinically apparent, and patient seems like a reasonable enough historian to notice this  Noted mildly elevated coag times and thrombocytopenia, patient appears clinically stable and no bleeding from IV sites or oral mucosa which otherwise may be concerning for TMA.  Continue to monitor    Constipation  Assessment & Plan  CT demonstrating constipation with large stool burden  Ordered bowel regimen with MiraLAX, soapsuds enema, and docusate for medical therapy    Cholecystitis  Assessment & Plan  Diagnosed during 5/2024 hospitalization at [facility], she is s/p percutaneous cholecystostomy tube placement  CT abd and pelvis in the ed demonstrating Cholelithiasis with pericholecystic inflammation suggestive of cholecystitis, possibly chronic noting similar findings on prior study. The gallbladder is again relatively decompressed with cholecystostomy tube terminating external to the gallbladder   lumen.  Recently completed course of amoxicillin  Continue routine cholecystostomy care and plan for outpatient surgery followup    Paroxysmal atrial fibrillation with RVR (HCC)  Assessment & Plan  Rate controlled on diltiazem and toprol XL  Holding eliquis for anemia and stool hemoccult positive    Stage 3 chronic kidney disease (HCC)  Assessment & Plan  Lab Results   Component Value Date    EGFR 85 08/12/2024    EGFR 86 08/09/2024    EGFR 84 08/01/2024    CREATININE 0.52 (L) 08/12/2024    CREATININE 0.60 08/09/2024    CREATININE 0.54 (L) 08/01/2024   Renal function appears baseline,  continue to monitor  Repeat BMP in the morning    Benign essential hypertension  Assessment & Plan  Resume home meds diltiazem 240 mg daily, lisinopril 20 mg daily, Toprol- mg p.o. twice daily    * COVID  Assessment & Plan  Patient sent to ED from [facility] noting with lethargy, fever, and vomiting  Reported temperature of 101.2 today  Patient also noted to have new oxygen need  She was brought to the ED where she was found to have infiltrate on x-ray and COVID-19 positive.  CT chest showing bilateral pleural effusion with some groundglass appearing infiltrates  Currently requiring 2 L O2 via nasal cannula  At present would classify this as mild disease.  Cover with remdesivir to prevent progression  I am unsure the utility of checking D-dimer and inflammatory markers inpatient with active cystitis and chronic cholecystitis.  Will hold off for now  Repeat Procal in AM         VTE Pharmacologic Prophylaxis:   Moderate Risk (Score 3-4) - Pharmacological DVT Prophylaxis Contraindicated. Sequential Compression Devices Ordered.  Code Status: Level 3 - DNAR and DNI   Discussion with family:  Attempted to call family, unable to reach.     Anticipated Length of Stay: Patient will be admitted on an inpatient basis with an anticipated length of stay of greater than 2 midnights secondary to Covid and anemia.    Total Time Spent on Date of Encounter in care of patient: 45 mins. This time was spent on one or more of the following: performing physical exam; counseling and coordination of care; obtaining or reviewing history; documenting in the medical record; reviewing/ordering tests, medications or procedures; communicating with other healthcare professionals and discussing with patient's family/caregivers.    Chief Complaint: Nausea and vomiting    History of Present Illness:  Jennifer Patel is a 89 y.o. female with a PMH of recently diagnosed cholecystitis A-fib on Eliquis, hypertension, hypothyroid among  others who presents with confusion, fever, and hypoxia.  Jennifer is a resident at [facility] and reportedly was noted to be more lethargic over the past few days.  They also noted new O2 needs up to 2 L O2 via nasal cannula as well as development of nausea and vomiting over the past few days.  Today she was noted to have a temperature of 101.2 and so was brought to the ED where she was found to be COVID-19 positive.  Jennifer herself seems a little somnolent but after orienting herself recalls that the symptoms that have been troubling her most recently has been nausea and vomiting.  She denies abdominal pain and states that her cholecystostomy tube has been functioning properly.  She seems like a reasonable historian and denies noting any blood in her stool or urine.    Review of Systems:  Review of Systems   Constitutional:  Positive for fatigue and fever. Negative for chills.   HENT:  Negative for sore throat.    Eyes:  Negative for pain and visual disturbance.   Respiratory:  Negative for cough and shortness of breath.    Cardiovascular:  Negative for chest pain and palpitations.   Gastrointestinal:  Positive for nausea and vomiting. Negative for abdominal pain.   Genitourinary:  Negative for dysuria and hematuria.   Musculoskeletal:  Negative for arthralgias and back pain.   Skin:  Negative for rash.   Neurological:  Negative for syncope.   All other systems reviewed and are negative.      Past Medical and Surgical History:   Past Medical History:   Diagnosis Date    Disease of thyroid gland     Hyperlipidemia     Hypertension     MI (myocardial infarction) (HCC) 04/2018    Tubal pregnancy        Past Surgical History:   Procedure Laterality Date    HYSTERECTOMY         Meds/Allergies:  Prior to Admission medications    Medication Sig Start Date End Date Taking? Authorizing Provider   atorvastatin (LIPITOR) 40 mg tablet Take 40 mg by mouth daily 9/11/18   Historical Provider, MD   Calcium  Carb-Cholecalciferol 600-10 MG-MCG TABS Take by mouth every other day 6/13/24   Historical Provider, MD   diltiazem (CARDIZEM CD) 240 mg 24 hr capsule Take 240 mg by mouth daily 7/27/24   Historical Provider, MD   Eliquis 2.5 MG Take 2.5 mg by mouth 2 (two) times a day    Historical Provider, MD   ibandronate (BONIVA) 150 MG tablet Take 150 mg by mouth Every month 6/20/24   Historical Provider, MD   levothyroxine 88 mcg tablet Take 88 mcg by mouth daily 7/24/18   Historical Provider, MD   lisinopril (ZESTRIL) 20 mg tablet Take 1 tablet (20 mg total) by mouth daily 8/3/24   Geoffrey Alex DO   metoprolol succinate (TOPROL-XL) 100 mg 24 hr tablet Take 100 mg by mouth 2 (two) times a day 10/18/18   Historical Provider, MD   mirtazapine (REMERON) 15 mg tablet Take 1 tablet (15 mg total) by mouth daily at bedtime 8/2/24   Geoffrey Alex DO   mometasone (NASONEX) 50 mcg/act nasal spray 2 sprays into each nostril daily 6/13/24   Historical Provider, MD   sertraline (ZOLOFT) 50 mg tablet Take 50 mg by mouth daily 7/12/24   Historical Provider, MD   sodium chloride 0.9 % SOLN 10 mL by Intracatheter route 7/11/24   Historical Provider, MD IRVING have reviewed home medications using recent Epic encounter.    Allergies: No Known Allergies    Social History:  Marital Status: /Civil Union   Occupation: Not on file  Patient Pre-hospital Living Situation: Assisted Living  Patient Pre-hospital Level of Mobility: walks  Patient Pre-hospital Diet Restrictions: N/A  Substance Use History:   Social History     Substance and Sexual Activity   Alcohol Use No     Social History     Tobacco Use   Smoking Status Never   Smokeless Tobacco Never     Social History     Substance and Sexual Activity   Drug Use No       Family History:  History reviewed. No pertinent family history.    Physical Exam:     Vitals:   Blood Pressure: 116/55 (08/12/24 1800)  Pulse: 58 (08/12/24 1800)  Temperature: 97.7 °F (36.5 °C) (08/12/24 1317)  Temp Source:  Oral (08/12/24 1317)  Respirations: 13 (08/12/24 1800)  SpO2: 99 % (08/12/24 1800)    Physical Exam  Vitals reviewed.   Constitutional:       General: She is not in acute distress.     Appearance: She is not ill-appearing.      Comments: elderly   HENT:      Head: Normocephalic.      Mouth/Throat:      Mouth: Mucous membranes are moist.   Eyes:      General: No scleral icterus.     Extraocular Movements: Extraocular movements intact.   Cardiovascular:      Rate and Rhythm: Normal rate and regular rhythm.      Pulses: Normal pulses.      Heart sounds: No murmur heard.  Pulmonary:      Effort: Pulmonary effort is normal. No respiratory distress.      Breath sounds: No wheezing or rhonchi.   Abdominal:      General: Abdomen is flat. Bowel sounds are normal. There is no distension.      Palpations: Abdomen is soft.      Tenderness: There is no abdominal tenderness. There is no guarding or rebound.   Musculoskeletal:      Right lower leg: No edema.      Left lower leg: No edema.   Skin:     General: Skin is warm.      Capillary Refill: Capillary refill takes less than 2 seconds.      Coloration: Skin is not jaundiced.      Findings: No rash.   Neurological:      General: No focal deficit present.      Mental Status: She is alert and oriented to person, place, and time.      Sensory: No sensory deficit.      Motor: No weakness.   Psychiatric:         Mood and Affect: Mood normal.         Behavior: Behavior normal.          Additional Data:     Lab Results:  Results from last 7 days   Lab Units 08/12/24  1405   WBC Thousand/uL 6.15   HEMOGLOBIN g/dL 8.7*   HEMATOCRIT % 26.8*   PLATELETS Thousands/uL 117*   SEGS PCT % 82*   LYMPHO PCT % 13*   MONO PCT % 5   EOS PCT % 0     Results from last 7 days   Lab Units 08/12/24  1405   SODIUM mmol/L 134*   POTASSIUM mmol/L 4.2   CHLORIDE mmol/L 101   CO2 mmol/L 29   BUN mg/dL 19   CREATININE mg/dL 0.52*   ANION GAP mmol/L 4   CALCIUM mg/dL 8.2*   ALBUMIN g/dL 2.7*   TOTAL BILIRUBIN  mg/dL 0.38   ALK PHOS U/L 58   ALT U/L 20   AST U/L 13   GLUCOSE RANDOM mg/dL 112     Results from last 7 days   Lab Units 08/12/24  1405   INR  1.60*     Results from last 7 days   Lab Units 08/12/24  1401   POC GLUCOSE mg/dl 110     Lab Results   Component Value Date    HGBA1C 5.5 10/09/2023    HGBA1C 5.5 04/25/2018     Results from last 7 days   Lab Units 08/12/24  1405   LACTIC ACID mmol/L 1.0   PROCALCITONIN ng/ml 0.16       Lines/Drains:  Invasive Devices       Peripheral Intravenous Line  Duration             Peripheral IV 08/12/24 Left Antecubital <1 day    Peripheral IV 08/12/24 Right Antecubital <1 day              Drain  Duration             Open Drain RLQ 13 days    Urethral Catheter 16 Fr. 10 days                  Urinary Catheter:  Goal for removal: Voiding trial when ambulation improves             Imaging: Personally reviewed the following imaging: chest CT scan  CT abdomen pelvis with contrast   Final Result by Mir Chaney MD (08/12 0649)      1.  Cholelithiasis with pericholecystic inflammation suggestive of cholecystitis, possibly chronic noting similar findings on prior study. The gallbladder is again relatively decompressed with cholecystostomy tube terminating external to the gallbladder    lumen.   2.  Bilateral pleural effusions. Bibasilar groundglass opacities suggestive of pulmonary edema versus infection.   3.  Akins catheter in place. Circumferential bladder wall thickening, possibly on the basis of cystitis versus under distention.   4.  Findings of constipation with large stool burden in the rectal vault.      The study was marked in EPIC for immediate notification.      Workstation performed: [id number]         XR chest portable   ED Interpretation by Marcial Ramirez DO (08/12 144)   Infiltrate noted in right lung      Final Result by Efren Guillaume MD (08/12 4871)      Interval development of mild, groundglass right-sided airspace opacity suspicious for pneumonia given the  history of fever.      This report is in agreement with the preliminary interpretation.               Workstation performed: [id number]         CT head wo contrast   Final Result by Zachary Solorio MD (08/12 1436)      No acute intracranial abnormality.                  Workstation performed: [id number]             EKG and Other Studies Reviewed on Admission:   EKG: NSR. HR 70.    ** Please Note: This note has been constructed using a voice recognition system. **

## 2024-08-12 NOTE — ASSESSMENT & PLAN NOTE
[facility] she is s/p percutaneous cholecystostomy tube placement  CT abd and pelvis in the ed demonstrating Cholelithiasis with pericholecystic inflammation suggestive of cholecystitis, possibly chronic noting similar findings on prior study. The gallbladder is again relatively decompressed with cholecystostomy tube terminating external to the gallbladder   lumen.  Recently completed course of amoxicillin  Continue routine cholecystostomy care and plan for outpatient surgery followup

## 2024-08-12 NOTE — ED ATTENDING ATTESTATION
8/12/2024  I, Tamika Perdomo MD, saw and evaluated the patient. I have discussed the patient with the resident/non-physician practitioner and agree with the resident's/non-physician practitioner's findings, Plan of Care, and MDM as documented in the resident's/non-physician practitioner's note, except where noted. All available labs and Radiology studies were reviewed.  I was present for key portions of any procedure(s) performed by the resident/non-physician practitioner and I was immediately available to provide assistance.       At this point I agree with the current assessment done in the Emergency Department.  I have conducted an independent evaluation of this patient a history and physical is as follows:    This is an evaluation of an 89-year-old female with multiple medical problems who was sent to the emergency department from her nursing facility with concern for fevers as well as nausea vomiting.  Of note patient had a recent hospitalization within the last few months for a cholecystitis status post percutaneous cholecystectomy with recent admission to [facility] from 7/29 - 8/2 for failure to thrive.  Patient denies any chest pain shortness of breath abdominal or back pain.  No headaches.  Concern from her facility for fevers and nausea vomiting.  Also noted to have a new oxygen requirement.  On exam patient is a frail-appearing elderly female.  Vital signs currently stable she did receive Tylenol prior to coming to the emergency department.  HEENT is normocephalic and atraumatic.  Pale and mildly dry mucous membranes.  Neck is supple full range of motion.  Heart is regular rate.  Lungs decreased at bilateral bases slightly worse on the right compared to left but no wheezing or rhonchi and poor effort overall.  Abdomen is soft nontender nondistended.  Positive bowel sounds.  Intact Akins catheter.  No edema.  No calf ttp.  Intact pulses.  Awake alert oriented.  Follows commands.  Assessment and plan  89-year-old female with fevers new oxygen requirement and nausea vomiting.  Will do evaluation including septic workup cardiac labs EKG and imaging.  Will give gentle IV fluid hydration.  Will treat accordingly.    ED Course         Critical Care Time  Procedures

## 2024-08-12 NOTE — ED PROVIDER NOTES
History  Chief Complaint   Patient presents with    Vomiting     N/V for a day now. Fever at [facility] however on arrival no fever present. Pt has no complaints at this time.      Patient is an 89-year-old female with past medical history of hypothyroidism, hyperlipidemia, hypertension, ACS, A-fib, cholecystitis status post cholecystostomy tube placed on 5/28/2024 who presents today with weakness, and fevers for the past couple of days.  Patient presented via EMS from [facility].  [facility] was contacted and they noted that the patient has been more tired the past couple of days and had several episodes of fever and vomiting.  The patient has been given Tylenol with mild relief of the fevers.  Most recent temperature was this morning with temperature at 101.2 F.  Culture pending also noted that the patient has been requiring oxygen in the past couple of days.  She is not on oxygen chronically.  Patient does not have any particular complaints at this time.  Patient has not vomited since being here.  Patient denies any constipation, diarrhea, chest pain, shortness of breath, abdominal pain, headaches, vision changes.         Prior to Admission Medications   Prescriptions Last Dose Informant Patient Reported? Taking?   Calcium Carb-Cholecalciferol 600-10 MG-MCG TABS   Yes No   Sig: Take by mouth every other day   Eliquis 2.5 MG   Yes No   Sig: Take 2.5 mg by mouth 2 (two) times a day   atorvastatin (LIPITOR) 40 mg tablet  Self Yes No   Sig: Take 40 mg by mouth daily   diltiazem (CARDIZEM CD) 240 mg 24 hr capsule   Yes No   Sig: Take 240 mg by mouth daily   ibandronate (BONIVA) 150 MG tablet   Yes No   Sig: Take 150 mg by mouth Every month   levothyroxine 88 mcg tablet  Self Yes No   Sig: Take 88 mcg by mouth daily   lisinopril (ZESTRIL) 20 mg tablet   No No   Sig: Take 1 tablet (20 mg total) by mouth daily   metoprolol succinate (TOPROL-XL) 100 mg 24 hr tablet  Self Yes No   Sig: Take 100 mg by  mouth 2 (two) times a day   mirtazapine (REMERON) 15 mg tablet   No No   Sig: Take 1 tablet (15 mg total) by mouth daily at bedtime   mometasone (NASONEX) 50 mcg/act nasal spray   Yes No   Si sprays into each nostril daily   sertraline (ZOLOFT) 50 mg tablet   Yes No   Sig: Take 50 mg by mouth daily   sodium chloride 0.9 % SOLN   Yes No   Sig: 10 mL by Intracatheter route      Facility-Administered Medications: None       Past Medical History:   Diagnosis Date    Disease of thyroid gland     Hyperlipidemia     Hypertension     MI (myocardial infarction) (HCC) 2018    Tubal pregnancy        Past Surgical History:   Procedure Laterality Date    HYSTERECTOMY         History reviewed. No pertinent family history.  I have reviewed and agree with the history as documented.    E-Cigarette/Vaping     E-Cigarette/Vaping Substances     Social History     Tobacco Use    Smoking status: Never    Smokeless tobacco: Never   Substance Use Topics    Alcohol use: No    Drug use: No        Review of Systems   Constitutional:  Positive for activity change, chills and fever.   HENT:  Negative for congestion, postnasal drip, rhinorrhea and sore throat.    Eyes:  Negative for pain.   Respiratory:  Negative for cough, choking, chest tightness, shortness of breath and wheezing.    Cardiovascular:  Negative for chest pain, palpitations and leg swelling.   Gastrointestinal:  Positive for nausea and vomiting. Negative for abdominal pain, blood in stool and diarrhea.   Genitourinary:  Negative for dysuria, flank pain, hematuria and urgency.   Skin:  Negative for pallor and wound.   Neurological:  Positive for weakness. Negative for dizziness, syncope, light-headedness and headaches.       Physical Exam  ED Triage Vitals [24 1317]   Temperature Pulse Respirations Blood Pressure SpO2   97.7 °F (36.5 °C) 70 20 136/62 97 %      Temp Source Heart Rate Source Patient Position - Orthostatic VS BP Location FiO2 (%)   Oral Monitor Sitting  Right arm --      Pain Score       --             Orthostatic Vital Signs  Vitals:    08/12/24 1500 08/12/24 1600 08/12/24 1700 08/12/24 1800   BP: 114/57 119/55 120/56 116/55   Pulse: 60 64 64 58   Patient Position - Orthostatic VS: Sitting Sitting Lying Lying       Physical Exam  Constitutional:       Appearance: She is ill-appearing. She is not toxic-appearing.   HENT:      Head: Normocephalic and atraumatic.      Mouth/Throat:      Mouth: Mucous membranes are moist.      Pharynx: Oropharynx is clear.   Eyes:      Extraocular Movements: Extraocular movements intact.      Conjunctiva/sclera: Conjunctivae normal.      Pupils: Pupils are equal, round, and reactive to light.   Cardiovascular:      Rate and Rhythm: Normal rate and regular rhythm.      Pulses: Normal pulses.      Heart sounds: Normal heart sounds.   Pulmonary:      Effort: Pulmonary effort is normal.      Breath sounds: Decreased air movement present. Examination of the right-middle field reveals rales. Examination of the right-lower field reveals rales. Rales present. No wheezing or rhonchi.   Abdominal:      General: Abdomen is flat. Bowel sounds are normal. There is no distension.      Palpations: Abdomen is soft. There is no mass.      Tenderness: There is no abdominal tenderness. There is no guarding.   Genitourinary:     Rectum: Normal. Guaiac result positive.   Musculoskeletal:         General: Normal range of motion.      Cervical back: Normal range of motion.   Skin:     General: Skin is warm and dry.   Neurological:      General: No focal deficit present.      Mental Status: She is alert and oriented to person, place, and time. Mental status is at baseline.   Psychiatric:         Mood and Affect: Mood normal.         ED Medications  Medications   ondansetron (ZOFRAN) injection 4 mg (0 mg Intravenous Hold 8/12/24 1618)   ceftriaxone (ROCEPHIN) 1 g/50 mL in dextrose IVPB (1,000 mg Intravenous New Bag 8/12/24 1845)   atorvastatin (LIPITOR)  tablet 40 mg (has no administration in time range)   calcium carbonate-vitamin D 500 mg-5 mcg tablet 1 tablet (0 tablets Oral Hold 8/12/24 1849)   diltiazem (CARDIZEM CD) 24 hr capsule 240 mg (has no administration in time range)   levothyroxine tablet 88 mcg (has no administration in time range)   lisinopril (ZESTRIL) tablet 20 mg (has no administration in time range)   metoprolol succinate (TOPROL-XL) 24 hr tablet 100 mg (has no administration in time range)   mirtazapine (REMERON) tablet 15 mg (has no administration in time range)   fluticasone (FLONASE) 50 mcg/act nasal spray 1 spray (0 sprays Each Nare Hold 8/12/24 1849)   sertraline (ZOLOFT) tablet 50 mg (has no administration in time range)   remdesivir (Veklury) 200 mg in sodium chloride 0.9 % 290 mL IVPB (has no administration in time range)     Followed by   remdesivir (Veklury) 100 mg in sodium chloride 0.9 % 270 mL IVPB (has no administration in time range)   multi-electrolyte (PLASMALYTE-A/ISOLYTE-S PH 7.4) IV solution (has no administration in time range)   polyethylene glycol (MIRALAX) packet 17 g (has no administration in time range)   docusate sodium (COLACE) capsule 100 mg (has no administration in time range)   sodium chloride 0.9 % bolus 500 mL (0 mL Intravenous Stopped 8/12/24 1656)   iohexol (OMNIPAQUE) 350 MG/ML injection (MULTI-DOSE) 85 mL (85 mL Intravenous Given 8/12/24 1616)       Diagnostic Studies  Results Reviewed       Procedure Component Value Units Date/Time    HS Troponin I 2hr [[id number]]  (Abnormal) Collected: 08/12/24 1745    Lab Status: Final result Specimen: Blood from Arm, Left Updated: 08/12/24 1826     hs TnI 2hr 74 ng/L      Delta 2hr hsTnI -35 ng/L     HS Troponin 0hr (reflex protocol) [[id number]]  (Abnormal) Collected: 08/12/24 1554    Lab Status: Final result Specimen: Blood from Arm, Right Updated: 08/12/24 1631     hs TnI 0hr 109 ng/L     HS Troponin I 4hr [[id number]]     Lab Status: No result Specimen: Blood      Procalcitonin [[id number]]  (Normal) Collected: 08/12/24 1405    Lab Status: Final result Specimen: Blood from Arm, Right Updated: 08/12/24 1556     Procalcitonin 0.16 ng/ml     UA w Reflex to Microscopic w Reflex to Culture [[id number]]  (Abnormal) Collected: 08/12/24 1406    Lab Status: Final result Specimen: Urine, Indwelling Akins Catheter Updated: 08/12/24 1529     Color, UA Light Orange     Clarity, UA Extra Turbid     Specific Gravity, UA 1.023     pH, UA 6.0     Leukocytes, UA Large     Nitrite, UA Negative     Protein, UA 70 (1+) mg/dl      Glucose, UA Negative mg/dl      Ketones, UA Negative mg/dl      Urobilinogen, UA <2.0 mg/dl      Bilirubin, UA Negative     Occult Blood, UA Large    Urine Microscopic [[id number]]  (Abnormal) Collected: 08/12/24 1406    Lab Status: Final result Specimen: Urine, Indwelling Akins Catheter Updated: 08/12/24 1529     RBC, UA Innumerable /hpf      WBC, UA Innumerable /hpf      Epithelial Cells None Seen /hpf      Bacteria, UA Occasional /hpf      MUCUS THREADS Occasional     Budding Yeast Present    Urine culture [[id number]] Collected: 08/12/24 1406    Lab Status: In process Specimen: Urine, Indwelling Akins Catheter Updated: 08/12/24 1529    FLU/RSV/COVID - if FLU/RSV clinically relevant [[id number]]  (Abnormal) Collected: 08/12/24 1405    Lab Status: Final result Specimen: Nares from Nose Updated: 08/12/24 1501     SARS-CoV-2 Positive     INFLUENZA A PCR Negative     INFLUENZA B PCR Negative     RSV PCR Negative    Narrative:      FOR PEDIATRIC PATIENTS - copy/paste COVID Guidelines URL to browser: [email or web address]    SARS-CoV-2 assay is a Nucleic Acid Amplification assay intended for the  qualitative detection of nucleic acid from SARS-CoV-2 in nasopharyngeal  swabs. Results are for the presumptive identification of SARS-CoV-2 RNA.    Positive results are indicative of infection with SARS-CoV-2, the virus  causing  COVID-19, but do not rule out bacterial infection or co-infection  with other viruses. Laboratories within the [place] and its  territories are required to report all positive results to the appropriate  public health authorities. Negative results do not preclude SARS-CoV-2  infection and should not be used as the sole basis for treatment or other  patient management decisions. Negative results must be combined with  clinical observations, patient history, and epidemiological information.  This test has not been FDA cleared or approved.    This test has been authorized by FDA under an Emergency Use Authorization  (EUA). This test is only authorized for the duration of time the  declaration that circumstances exist justifying the authorization of the  emergency use of an in vitro diagnostic tests for detection of SARS-CoV-2  virus and/or diagnosis of COVID-19 infection under section 564(b)(1) of  the Act, 21 U.S.C. 360bbb-3(b)(1), unless the authorization is terminated  or revoked sooner. The test has been validated but independent review by FDA  and CLIA is pending.    Test performed using [employer] GeneXpert: This RT-PCR assay targets N2,  a region unique to SARS-CoV-2. A conserved region in the E-gene was chosen  for pan-Sarbecovirus detection which includes SARS-CoV-2.    According to CMS-2020-01-R, this platform meets the definition of high-throughput technology.    TSH, 3rd generation with Free T4 reflex [[id number]]  (Normal) Collected: 08/12/24 1405    Lab Status: Final result Specimen: Blood from Arm, Right Updated: 08/12/24 1457     TSH 3RD GENERATON 2.280 uIU/mL     Protime-INR [[id number]]  (Abnormal) Collected: 08/12/24 1405    Lab Status: Final result Specimen: Blood from Arm, Right Updated: 08/12/24 1444     Protime 19.3 seconds      INR 1.60    Narrative:      INR Therapeutic Range    Indication                                             INR Range      Atrial Fibrillation                                                2.0-3.0  Hypercoagulable State                                    2.0.2.3  Left Ventricular Asist Device                            2.0-3.0  Mechanical Heart Valve                                  -    Aortic(with afib, MI, embolism, HF, LA enlargement,    and/or coagulopathy)                                     2.0-3.0 (2.5-3.5)     Mitral                                                             2.5-3.5  Prosthetic/Bioprosthetic Heart Valve               2.0-3.0  Venous thromboembolism (VTE: VT, PE        2.0-3.0    APTT [[id number]]  (Abnormal) Collected: 08/12/24 1405    Lab Status: Final result Specimen: Blood from Arm, Right Updated: 08/12/24 1444     PTT 36 seconds     Lactic acid [[id number]]  (Normal) Collected: 08/12/24 1405    Lab Status: Final result Specimen: Blood from Arm, Right Updated: 08/12/24 1442     LACTIC ACID 1.0 mmol/L     Narrative:      Result may be elevated if tourniquet was used during collection.    Comprehensive metabolic panel [[id number]]  (Abnormal) Collected: 08/12/24 1405    Lab Status: Final result Specimen: Blood from Arm, Right Updated: 08/12/24 1442     Sodium 134 mmol/L      Potassium 4.2 mmol/L      Chloride 101 mmol/L      CO2 29 mmol/L      ANION GAP 4 mmol/L      BUN 19 mg/dL      Creatinine 0.52 mg/dL      Glucose 112 mg/dL      Calcium 8.2 mg/dL      Corrected Calcium 9.2 mg/dL      AST 13 U/L      ALT 20 U/L      Alkaline Phosphatase 58 U/L      Total Protein 5.2 g/dL      Albumin 2.7 g/dL      Total Bilirubin 0.38 mg/dL      eGFR 85 ml/min/1.73sq m     Narrative:      National Kidney Disease Foundation guidelines for Chronic Kidney Disease (CKD):     Stage 1 with normal or high GFR (GFR > 90 mL/min/1.73 square meters)    Stage 2 Mild CKD (GFR = 60-89 mL/min/1.73 square meters)    Stage 3A Moderate CKD (GFR = 45-59 mL/min/1.73 square meters)    Stage 3B Moderate CKD (GFR = 30-44 mL/min/1.73 square meters)    Stage 4 Severe CKD (GFR = 15-29 mL/min/1.73  square meters)    Stage 5 End Stage CKD (GFR <15 mL/min/1.73 square meters)  Note: GFR calculation is accurate only with a steady state creatinine    CBC and differential [[id number]]  (Abnormal) Collected: 08/12/24 1405    Lab Status: Final result Specimen: Blood from Arm, Right Updated: 08/12/24 1425     WBC 6.15 Thousand/uL      RBC 2.77 Million/uL      Hemoglobin 8.7 g/dL      Hematocrit 26.8 %      MCV 97 fL      MCH 31.4 pg      MCHC 32.5 g/dL      RDW 15.8 %      MPV 10.9 fL      Platelets 117 Thousands/uL      nRBC 0 /100 WBCs      Segmented % 82 %      Immature Grans % 0 %      Lymphocytes % 13 %      Monocytes % 5 %      Eosinophils Relative 0 %      Basophils Relative 0 %      Absolute Neutrophils 4.99 Thousands/µL      Absolute Immature Grans 0.02 Thousand/uL      Absolute Lymphocytes 0.79 Thousands/µL      Absolute Monocytes 0.33 Thousand/µL      Eosinophils Absolute 0.00 Thousand/µL      Basophils Absolute 0.02 Thousands/µL     Blood culture #2 [[id number]] Collected: 08/12/24 1405    Lab Status: In process Specimen: Blood from Arm, Right Updated: 08/12/24 1417    Blood culture #1 [[id number]] Collected: 08/12/24 1405    Lab Status: In process Specimen: Blood from Arm, Left Updated: 08/12/24 1417    Fingerstick Glucose (POCT) [[id number]]  (Normal) Collected: 08/12/24 1401    Lab Status: Final result Specimen: Blood Updated: 08/12/24 1405     POC Glucose 110 mg/dl                    CT abdomen pelvis with contrast   Final Result by Mir Chaney MD (08/12 1655)      1.  Cholelithiasis with pericholecystic inflammation suggestive of cholecystitis, possibly chronic noting similar findings on prior study. The gallbladder is again relatively decompressed with cholecystostomy tube terminating external to the gallbladder    lumen.   2.  Bilateral pleural effusions. Bibasilar groundglass opacities suggestive of pulmonary edema versus infection.   3.  Akins catheter in place. Circumferential bladder wall  thickening, possibly on the basis of cystitis versus under distention.   4.  Findings of constipation with large stool burden in the rectal vault.      The study was marked in EPIC for immediate notification.      Workstation performed: [id number]         XR chest portable   ED Interpretation by Marcial Ramirez DO (08/12 1448)   Infiltrate noted in right lung      Final Result by Efren Guillaume MD (08/12 1634)      Interval development of mild, groundglass right-sided airspace opacity suspicious for pneumonia given the history of fever.      This report is in agreement with the preliminary interpretation.               Workstation performed: [id number]         CT head wo contrast   Final Result by Zachary Solorio MD (08/12 1436)      No acute intracranial abnormality.                  Workstation performed: [id number]               Procedures  Procedures      ED Course  ED Course as of 08/12/24 1929   Mon Aug 12, 2024   1338 Patient's nursing home contacted for additional information.  They noted the patient was having fevers and nausea and vomiting over the weekend.  They note multiple temperatures above 100.4.  They have been giving the patient Tylenol and Zofran for fevers and vomiting without relief.   1431 CBC and differential(!)  New anemia compared to previous just over a week ago.  Rectal exam performed, Hemoccult positive.  Type and screen added.   1432 Fingerstick Glucose (POCT)  Reassuring   1447 Protime-INR(!)  Patient on anticoagulation   1447 APTT(!)  Patient on anticoagulation   1447 Comprehensive metabolic panel(!)  Reassuring   1447 CT head wo contrast  Normal CT head   1502 FLU/RSV/COVID - if FLU/RSV clinically relevant(!)  COVID-positive, likely the reason for her right-sided infiltrate.   1503 TSH, 3rd generation with Free T4 reflex  Reassuring   1559 Procalcitonin  Reassuring   1638 HS Troponin 0hr (reflex protocol)(!)  Likely related to COVID-positive.  Will trend troponins.   1638 XR  chest portable  Interval development of mild, groundglass right-sided airspace opacity suspicious for pneumonia given the history of fever.     This report is in agreement with the preliminary interpretation.   1800 CT abdomen pelvis with contrast  1.  Cholelithiasis with pericholecystic inflammation suggestive of cholecystitis, possibly chronic noting similar findings on prior study. The gallbladder is again relatively decompressed with cholecystostomy tube terminating external to the gallbladder   lumen.  2.  Bilateral pleural effusions. Bibasilar groundglass opacities suggestive of pulmonary edema versus infection.  3.  Akins catheter in place. Circumferential bladder wall thickening, possibly on the basis of cystitis versus under distention.  4.  Findings of constipation with large stool burden in the rectal vault.    Chronic findings along with new pulmonary edema.                             SBIRT 22yo+      Flowsheet Row Most Recent Value   Initial Alcohol Screen: US AUDIT-C     1. How often do you have a drink containing alcohol? 0 Filed at: 08/12/2024 1320   2. How many drinks containing alcohol do you have on a typical day you are drinking?  0 Filed at: 08/12/2024 1320   3b. FEMALE Any Age, or MALE 65+: How often do you have 4 or more drinks on one occassion? 0 Filed at: 08/12/2024 1320   Audit-C Score 0 Filed at: 08/12/2024 1320   [place]: How many times in the past year have you...    Used an illegal drug or used a prescription medication for non-medical reasons? Never Filed at: 08/12/2024 1320                  Medical Decision Making  Patient is a 89 y.o. female with PMH of hypothyroidism, hyperlipidemia, hypertension, ACS, A-fib, cholecystitis status postcholecystectomy tube placed on 5/28/2024 who presents to the ED with weakness and fevers.    Vital signs stable, afebrile, normotensive. Physical exam as above.    History and physical exam most consistent with gastroenteritis. However, differential  "diagnosis included but not limited to COVID, cancer, Head injury, sepsis, anemia, ACS, UTI, hypothyroidism, electrolyte abnormality.     Plan: CBC, CMP, troponin, lactic acid, Pro-Gabriele, PT/INR, APTT, TSH, UA, blood cultures, EKG, chest x-ray, CT head, CT abdomen and pelvis, Zofran, fluid.    View ED course above for further discussion on patient workup.     On review of previous records, patient admitted on 7/29/2024 to 8/2/2024 for malnutrition, unsteadiness on feet, and failure to thrive.  Patient discharged to her current nursing home..    All labs reviewed and utilized in the medical decision making process  All radiology studies independently viewed by me and interpreted by the radiologist.  I reviewed all testing with the patient.     Upon re-evaluation patient continued stable with normal vital signs.    Disposition: Patient admitted for treatment of COVID in the setting of oxygen requirement and severe comorbidities, as well as guaiac positive stool.    Portions of the record may have been created with voice recognition software. Occasional wrong word or \"sound a like\" substitutions may have occurred due to the inherent limitations of voice recognition software. Read the chart carefully and recognize, using context, where substitutions have occurred.     Amount and/or Complexity of Data Reviewed  Labs: ordered. Decision-making details documented in ED Course.  Radiology: ordered and independent interpretation performed. Decision-making details documented in ED Course.    Risk  Prescription drug management.  Decision regarding hospitalization.        Disposition  Final diagnoses:   COVID-19   Pulmonary infiltrate   Pulmonary edema   Nausea and vomiting   Hypothyroidism   Hyperlipidemia   Hypertension   Atrial fibrillation, unspecified type (HCC)   Guaiac positive stools     Time reflects when diagnosis was documented in both MDM as applicable and the Disposition within this note       Time User Action Codes " Description Comment    8/12/2024  6:02 PM Marcial Ramirez Add [U07.1] COVID-19     8/12/2024  6:02 PM JamesMarcial Add [R91.8] Pulmonary infiltrate     8/12/2024  6:03 PM James, Marcial Add [J81.1] Pulmonary edema     8/12/2024  6:03 PM James, Marcial Add [R11.2] Nausea and vomiting     8/12/2024  6:03 PM James, Marcial Add [E03.9] Hypothyroidism     8/12/2024  6:03 PM James, Marcial Add [E78.5] Hyperlipidemia     8/12/2024  6:03 PM James, Marcial Add [I10] Hypertension     8/12/2024  6:03 PM James, Marcial Add [I48.91] Atrial fibrillation, unspecified type (HCC)     8/12/2024  7:29 PM James, Marcial Add [R19.5] Guaiac positive stools           ED Disposition       ED Disposition   Admit    Condition   Stable    Date/Time   Mon Aug 12, 2024 1802    Comment   Case was discussed with Dr. Lindsey and the patient's admission status was agreed to be Admission Status: inpatient status to the service of Dr. Lindsey.               Follow-up Information    None         Current Discharge Medication List        CONTINUE these medications which have NOT CHANGED    Details   atorvastatin (LIPITOR) 40 mg tablet Take 40 mg by mouth daily      Calcium Carb-Cholecalciferol 600-10 MG-MCG TABS Take by mouth every other day      diltiazem (CARDIZEM CD) 240 mg 24 hr capsule Take 240 mg by mouth daily      Eliquis 2.5 MG Take 2.5 mg by mouth 2 (two) times a day      ibandronate (BONIVA) 150 MG tablet Take 150 mg by mouth Every month      levothyroxine 88 mcg tablet Take 88 mcg by mouth daily      lisinopril (ZESTRIL) 20 mg tablet Take 1 tablet (20 mg total) by mouth daily  Qty: 90 tablet, Refills: 0    Associated Diagnoses: Failure to thrive in adult      metoprolol succinate (TOPROL-XL) 100 mg 24 hr tablet Take 100 mg by mouth 2 (two) times a day      mirtazapine (REMERON) 15 mg tablet Take 1 tablet (15 mg total) by mouth daily at bedtime  Qty: 90 tablet, Refills: 0    Associated Diagnoses: Failure to thrive in adult      mometasone  (NASONEX) 50 mcg/act nasal spray 2 sprays into each nostril daily      sertraline (ZOLOFT) 50 mg tablet Take 50 mg by mouth daily      sodium chloride 0.9 % SOLN 10 mL by Intracatheter route           No discharge procedures on file.    PDMP Review         Value Time User    PDMP Reviewed  Yes 7/29/2024  9:16 PM Abraham Holley DO             ED Provider  Attending physically available and evaluated Jennifer Patel. I managed the patient along with the ED Attending.    Electronically Signed by           Marcial Ramirez DO  08/12/24 4638

## 2024-08-12 NOTE — ASSESSMENT & PLAN NOTE
Patient sent to ED from [facility] noting with lethargy, fever, and vomiting  Reported temperature of 101.2 today  Patient also noted to have new oxygen need  She was brought to the ED where she was found to have infiltrate on x-ray and COVID-19 positive.  CT chest showing bilateral pleural effusion with some groundglass appearing infiltrates  Currently requiring 2 L O2 via nasal cannula  At present would classify this as mild disease.  Cover with remdesivir to prevent progression  I am unsure the utility of checking D-dimer and inflammatory markers inpatient with active cystitis and chronic cholecystitis.  Will hold off for now  Repeat Procal in AM

## 2024-08-13 LAB
ANION GAP SERPL CALCULATED.3IONS-SCNC: 8 MMOL/L (ref 4–13)
APTT PPP: 36 SECONDS (ref 23–34)
BUN SERPL-MCNC: 16 MG/DL (ref 5–25)
CALCIUM SERPL-MCNC: 7.8 MG/DL (ref 8.4–10.2)
CHLORIDE SERPL-SCNC: 102 MMOL/L (ref 96–108)
CO2 SERPL-SCNC: 27 MMOL/L (ref 21–32)
CREAT SERPL-MCNC: 0.4 MG/DL (ref 0.6–1.3)
ERYTHROCYTE [DISTWIDTH] IN BLOOD BY AUTOMATED COUNT: 15.7 % (ref 11.6–15.1)
ERYTHROCYTE [DISTWIDTH] IN BLOOD BY AUTOMATED COUNT: 15.8 % (ref 11.6–15.1)
GFR SERPL CREATININE-BSD FRML MDRD: 93 ML/MIN/1.73SQ M
GLUCOSE SERPL-MCNC: 69 MG/DL (ref 65–140)
HCT VFR BLD AUTO: 26.9 % (ref 34.8–46.1)
HCT VFR BLD AUTO: 27.3 % (ref 34.8–46.1)
HGB BLD-MCNC: 8.7 G/DL (ref 11.5–15.4)
HGB BLD-MCNC: 8.8 G/DL (ref 11.5–15.4)
INR PPP: 1.37 (ref 0.85–1.19)
MCH RBC QN AUTO: 31.5 PG (ref 26.8–34.3)
MCH RBC QN AUTO: 31.7 PG (ref 26.8–34.3)
MCHC RBC AUTO-ENTMCNC: 32.2 G/DL (ref 31.4–37.4)
MCHC RBC AUTO-ENTMCNC: 32.3 G/DL (ref 31.4–37.4)
MCV RBC AUTO: 98 FL (ref 82–98)
MCV RBC AUTO: 98 FL (ref 82–98)
PLATELET # BLD AUTO: 111 THOUSANDS/UL (ref 149–390)
PLATELET # BLD AUTO: 97 THOUSANDS/UL (ref 149–390)
PMV BLD AUTO: 10.5 FL (ref 8.9–12.7)
PMV BLD AUTO: 11.3 FL (ref 8.9–12.7)
POTASSIUM SERPL-SCNC: 3.8 MMOL/L (ref 3.5–5.3)
PROCALCITONIN SERPL-MCNC: 0.21 NG/ML
PROTHROMBIN TIME: 17.1 SECONDS (ref 12.3–15)
RBC # BLD AUTO: 2.76 MILLION/UL (ref 3.81–5.12)
RBC # BLD AUTO: 2.78 MILLION/UL (ref 3.81–5.12)
SODIUM SERPL-SCNC: 137 MMOL/L (ref 135–147)
WBC # BLD AUTO: 5.45 THOUSAND/UL (ref 4.31–10.16)
WBC # BLD AUTO: 6.28 THOUSAND/UL (ref 4.31–10.16)

## 2024-08-13 PROCEDURE — 80048 BASIC METABOLIC PNL TOTAL CA: CPT | Performed by: STUDENT IN AN ORGANIZED HEALTH CARE EDUCATION/TRAINING PROGRAM

## 2024-08-13 PROCEDURE — 85027 COMPLETE CBC AUTOMATED: CPT | Performed by: STUDENT IN AN ORGANIZED HEALTH CARE EDUCATION/TRAINING PROGRAM

## 2024-08-13 PROCEDURE — 99232 SBSQ HOSP IP/OBS MODERATE 35: CPT | Performed by: INTERNAL MEDICINE

## 2024-08-13 PROCEDURE — 85730 THROMBOPLASTIN TIME PARTIAL: CPT | Performed by: STUDENT IN AN ORGANIZED HEALTH CARE EDUCATION/TRAINING PROGRAM

## 2024-08-13 PROCEDURE — 84145 PROCALCITONIN (PCT): CPT | Performed by: STUDENT IN AN ORGANIZED HEALTH CARE EDUCATION/TRAINING PROGRAM

## 2024-08-13 PROCEDURE — 85610 PROTHROMBIN TIME: CPT | Performed by: STUDENT IN AN ORGANIZED HEALTH CARE EDUCATION/TRAINING PROGRAM

## 2024-08-13 RX ORDER — MAGNESIUM HYDROXIDE/ALUMINUM HYDROXICE/SIMETHICONE 120; 1200; 1200 MG/30ML; MG/30ML; MG/30ML
30 SUSPENSION ORAL EVERY 4 HOURS PRN
Status: DISCONTINUED | OUTPATIENT
Start: 2024-08-13 | End: 2024-08-17 | Stop reason: HOSPADM

## 2024-08-13 RX ORDER — ACETAMINOPHEN 325 MG/1
650 TABLET ORAL EVERY 6 HOURS PRN
Status: DISCONTINUED | OUTPATIENT
Start: 2024-08-13 | End: 2024-08-17 | Stop reason: HOSPADM

## 2024-08-13 RX ORDER — DEXAMETHASONE SODIUM PHOSPHATE 10 MG/ML
6 INJECTION, SOLUTION INTRAMUSCULAR; INTRAVENOUS DAILY
Status: DISCONTINUED | OUTPATIENT
Start: 2024-08-13 | End: 2024-08-17 | Stop reason: HOSPADM

## 2024-08-13 RX ADMIN — DILTIAZEM HYDROCHLORIDE 240 MG: 240 CAPSULE, EXTENDED RELEASE ORAL at 09:29

## 2024-08-13 RX ADMIN — MIRTAZAPINE 15 MG: 15 TABLET, FILM COATED ORAL at 22:33

## 2024-08-13 RX ADMIN — DEXAMETHASONE SODIUM PHOSPHATE 6 MG: 10 INJECTION INTRAMUSCULAR; INTRAVENOUS at 14:00

## 2024-08-13 RX ADMIN — ACETAMINOPHEN 650 MG: 325 TABLET ORAL at 18:39

## 2024-08-13 RX ADMIN — FLUTICASONE PROPIONATE 1 SPRAY: 50 SPRAY, METERED NASAL at 17:30

## 2024-08-13 RX ADMIN — LISINOPRIL 20 MG: 20 TABLET ORAL at 09:29

## 2024-08-13 RX ADMIN — FLUTICASONE PROPIONATE 1 SPRAY: 50 SPRAY, METERED NASAL at 09:30

## 2024-08-13 RX ADMIN — CEFTRIAXONE SODIUM 1000 MG: 10 INJECTION, POWDER, FOR SOLUTION INTRAVENOUS at 18:29

## 2024-08-13 RX ADMIN — SERTRALINE HYDROCHLORIDE 50 MG: 50 TABLET ORAL at 09:29

## 2024-08-13 RX ADMIN — METOPROLOL SUCCINATE 100 MG: 100 TABLET, EXTENDED RELEASE ORAL at 09:29

## 2024-08-13 RX ADMIN — REMDESIVIR 100 MG: 100 INJECTION, POWDER, LYOPHILIZED, FOR SOLUTION INTRAVENOUS at 20:00

## 2024-08-13 RX ADMIN — METOPROLOL SUCCINATE 100 MG: 100 TABLET, EXTENDED RELEASE ORAL at 22:33

## 2024-08-13 RX ADMIN — DOCUSATE SODIUM 100 MG: 100 CAPSULE, LIQUID FILLED ORAL at 09:29

## 2024-08-13 RX ADMIN — ATORVASTATIN CALCIUM 40 MG: 40 TABLET, FILM COATED ORAL at 09:29

## 2024-08-13 RX ADMIN — SODIUM CHLORIDE, SODIUM GLUCONATE, SODIUM ACETATE, POTASSIUM CHLORIDE, MAGNESIUM CHLORIDE, SODIUM PHOSPHATE, DIBASIC, AND POTASSIUM PHOSPHATE 75 ML/HR: .53; .5; .37; .037; .03; .012; .00082 INJECTION, SOLUTION INTRAVENOUS at 05:06

## 2024-08-13 RX ADMIN — DOCUSATE SODIUM 100 MG: 100 CAPSULE, LIQUID FILLED ORAL at 17:30

## 2024-08-13 RX ADMIN — LEVOTHYROXINE SODIUM 88 MCG: 88 TABLET ORAL at 05:15

## 2024-08-13 RX ADMIN — ALUMINUM HYDROXIDE, MAGNESIUM HYDROXIDE, AND DIMETHICONE 30 ML: 200; 20; 200 SUSPENSION ORAL at 18:39

## 2024-08-13 NOTE — ASSESSMENT & PLAN NOTE
Platelet 117, appears lower than previously recorded plt counts from last hospitalization which was in the range of 200s-230s   CBC 3 days before admission in [facility] network noted plt count 121  Patient was recently hospitalized at our facility from 7/29-8 2, however it does not appear as though she received heparin products because she is on apixaban  Suspect this is consumptive in the setting of of COVID and possibly cystitis.  Low concern for TMA/DIC, however will continue to monitor with repeat coags and CBC in the morning

## 2024-08-13 NOTE — PROGRESS NOTES
[facility], [place]  Progress Note  Name: Jennifer Patel I  MRN: [id number]  Unit/Bed#: [facility] 633-01 I Date of Admission: 8/12/2024   Date of Service: 8/13/2024 I Hospital Day: 1    Assessment & Plan   Elevated troponin  Assessment & Plan  Troponin in the ED elevated to 109->74  Patient without chest pain and EKG does not appear to show any acute ischemic appearing changes  Suspect this is some demand ischemia in the setting of COVID infection  Monitor on telemetry for 24 hours  Holding eliquis due to anemia, continue to monitor    Cystitis  Assessment & Plan  Urinalysis was concern for possible infection and CT showing thickened bladder wall concerning for cystitis  Akins placed in ED  Cover with Rocephin and monitor blood and urine cultures    Nausea and vomiting  Assessment & Plan  Likely secondary to COVID-19 infection   Zofran as needed, gentle hydration will be ordered  Cholecystostomy tube appears to be functioning properly, no abdominal tenderness    Thrombocytopenia (HCC)  Assessment & Plan  Platelet 117, appears lower than previously recorded plt counts from last hospitalization which was in the range of 200s-230s   CBC 3 days before admission in [facility] network noted plt count 121  Patient was recently hospitalized at our facility from 7/29-8 2, however it does not appear as though she received heparin products because she is on apixaban  Suspect this is consumptive in the setting of of COVID and possibly cystitis.  Low concern for TMA/DIC, however will continue to monitor with repeat coags and CBC in the morning    Anemia  Assessment & Plan  Patient noted to have hemoglobin 8.7 on admission, this is noted to be reduced from recent blood work from around 2 days ago with hemoglobins ranging from 10-12.  Care everywhere has a CBC ordered from [facility] showing hgb 8.6 on 8/9  Fortunately likely less concerning for acute bleed  Patient noted to be Hemoccult positive in the ED, CT  showing constipation with large stool burden  Hold Eliquis in the setting of anemia, at present suspect that she may have some GI blood loss possibly in the form hemorrhoid, fissure, or stercoral ulcer in the setting of large stool burden noted on CT  Urinalysis and Urine micro does note large blood,, however after Akins was placed, urine does not appear to be grossly bloody.  Suspect that anemia secondary to gross hematuria would be more clinically apparent, and patient seems like a reasonable enough historian to notice this      Constipation  Assessment & Plan  CT demonstrating constipation with large stool burden  Ordered bowel regimen with MiraLAX, soapsuds enema, and docusate for medical therapy    Cholecystitis  Assessment & Plan  Diagnosed during 5/2024 hospitalization at [facility], she is s/p percutaneous cholecystostomy tube placement  CT abd and pelvis in the ed demonstrating Cholelithiasis with pericholecystic inflammation suggestive of cholecystitis, possibly chronic noting similar findings on prior study. The gallbladder is again relatively decompressed with cholecystostomy tube terminating external to the gallbladder   lumen.  Recently completed course of amoxicillin  Continue routine cholecystostomy care and plan for outpatient surgery followup    Paroxysmal atrial fibrillation with RVR (HCC)  Assessment & Plan  Rate controlled on diltiazem and toprol XL  Holding eliquis for anemia and stool hemoccult positive    Stage 3 chronic kidney disease (HCC)  Assessment & Plan  Lab Results   Component Value Date    EGFR 93 08/13/2024    EGFR 85 08/12/2024    EGFR 86 08/09/2024    CREATININE 0.40 (L) 08/13/2024    CREATININE 0.52 (L) 08/12/2024    CREATININE 0.60 08/09/2024   Renal function appears baseline, continue to monitor  Repeat BMP in the morning    Benign essential hypertension  Assessment & Plan  Resume home meds diltiazem 240 mg daily, lisinopril 20 mg daily, Toprol- mg p.o. twice  daily    * COVID  Assessment & Plan  Patient sent to ED from [facility] noting with lethargy, fever, and vomiting  Reported temperature of 101.2 today  Patient also noted to have new oxygen need  She was brought to the ED where she was found to have infiltrate on x-ray and COVID-19 positive.  CT chest showing bilateral pleural effusion with some groundglass appearing infiltrates  Currently requiring 2 L O2 via nasal cannula  At present would classify this as mild disease.  Cover with remdesivir to prevent progression  I am unsure the utility of checking D-dimer and inflammatory markers inpatient with active cystitis and chronic cholecystitis.  Will hold off for now                 VTE Pharmacologic Prophylaxis:   Low Risk (Score 0-2) - Encourage Ambulation.    Mobility:   Basic Mobility Inpatient Raw Score: 11  JH-HLM Goal: 4: Move to chair/commode  JH-HLM Achieved: 1: Laying in bed  JH-HLM Goal achieved. Continue to encourage appropriate mobility.    Patient Centered Rounds: I performed bedside rounds with nursing staff today.   Discussions with Specialists or Other Care Team Provider:     Education and Discussions with Family / Patient: Attempted to update  (significant other) via phone. Left voicemail.     Total Time Spent on Date of Encounter in care of patient:  mins. This time was spent on one or more of the following: performing physical exam; counseling and coordination of care; obtaining or reviewing history; documenting in the medical record; reviewing/ordering tests, medications or procedures; communicating with other healthcare professionals and discussing with patient's family/caregivers.    Current Length of Stay: 1 day(s)  Current Patient Status: Inpatient   Certification Statement: The patient will continue to require additional inpatient hospital stay due to covid tx  Discharge Plan: Anticipate discharge in 48 hrs to facility    Code Status: Level 3 - DNAR and DNI    Subjective:    Patient reports poor appetiite and fatigue but denies any other acute complaints    Objective:     Vitals:   Temp (24hrs), Av °F (36.7 °C), Min:97.5 °F (36.4 °C), Max:98.4 °F (36.9 °C)    Temp:  [97.5 °F (36.4 °C)-98.4 °F (36.9 °C)] 98.4 °F (36.9 °C)  HR:  [58-85] 84  Resp:  [13-18] 18  BP: (114-162)/(55-88) 145/77  SpO2:  [95 %-100 %] 98 %  Body mass index is 20.12 kg/m².     Input and Output Summary (last 24 hours):     Intake/Output Summary (Last 24 hours) at 2024 1401  Last data filed at 2024 1100  Gross per 24 hour   Intake 652 ml   Output 620 ml   Net 32 ml       Physical Exam:   Physical Exam  Vitals and nursing note reviewed.   Constitutional:       General: She is not in acute distress.     Appearance: She is well-developed. She is ill-appearing. She is not toxic-appearing or diaphoretic.      Comments: Chronically ill appearing   HENT:      Head: Normocephalic and atraumatic.   Eyes:      General: No scleral icterus.     Conjunctiva/sclera: Conjunctivae normal.   Cardiovascular:      Rate and Rhythm: Normal rate and regular rhythm.      Heart sounds: No murmur heard.     No friction rub. No gallop.   Pulmonary:      Effort: Pulmonary effort is normal. No respiratory distress.      Breath sounds: Normal breath sounds. No stridor. No wheezing, rhonchi or rales.      Comments: 2l nc decreased breath sounds bilaterally  Chest:      Chest wall: No tenderness.   Abdominal:      General: There is no distension.      Palpations: Abdomen is soft. There is no mass.      Tenderness: There is no abdominal tenderness. There is no guarding or rebound.      Hernia: No hernia is present.   Musculoskeletal:         General: No swelling or tenderness.      Cervical back: Neck supple.   Skin:     General: Skin is warm and dry.      Capillary Refill: Capillary refill takes less than 2 seconds.   Neurological:      Mental Status: She is alert.   Psychiatric:         Mood and Affect: Mood normal.           Additional Data:     Labs:  Results from last 7 days   Lab Units 08/13/24  0521 08/12/24  2324 08/12/24  1405   WBC Thousand/uL 5.45   < > 6.15   HEMOGLOBIN g/dL 8.8*   < > 8.7*   HEMATOCRIT % 27.3*   < > 26.8*   PLATELETS Thousands/uL 111*   < > 117*   SEGS PCT %  --   --  82*   LYMPHO PCT %  --   --  13*   MONO PCT %  --   --  5   EOS PCT %  --   --  0    < > = values in this interval not displayed.     Results from last 7 days   Lab Units 08/13/24  0521 08/12/24  1405   SODIUM mmol/L 137 134*   POTASSIUM mmol/L 3.8 4.2   CHLORIDE mmol/L 102 101   CO2 mmol/L 27 29   BUN mg/dL 16 19   CREATININE mg/dL 0.40* 0.52*   ANION GAP mmol/L 8 4   CALCIUM mg/dL 7.8* 8.2*   ALBUMIN g/dL  --  2.7*   TOTAL BILIRUBIN mg/dL  --  0.38   ALK PHOS U/L  --  58   ALT U/L  --  20   AST U/L  --  13   GLUCOSE RANDOM mg/dL 69 112     Results from last 7 days   Lab Units 08/13/24  0521   INR  1.37*     Results from last 7 days   Lab Units 08/12/24  1401   POC GLUCOSE mg/dl 110         Results from last 7 days   Lab Units 08/13/24  0521 08/12/24  1405   LACTIC ACID mmol/L  --  1.0   PROCALCITONIN ng/ml 0.21 0.16       Lines/Drains:  Invasive Devices       Peripheral Intravenous Line  Duration             Peripheral IV 08/12/24 Right Antecubital 1 day    Peripheral IV 08/12/24 Left Antecubital <1 day              Drain  Duration             Open Drain RLQ 14 days    Urethral Catheter 16 Fr. 11 days                  Urinary Catheter:  Goal for removal: N/A- Discharging with Akins           Telemetry:  Telemetry Orders (From admission, onward)               24 Hour Telemetry Monitoring  Continuous x 24 Hours (Telem)        Expiring   Question:  Reason for 24 Hour Telemetry  Answer:  Patients with abrahan/milena/endocarditis; cardiac contusion                                Imaging: No pertinent imaging reviewed.    Recent Cultures (last 7 days):   Results from last 7 days   Lab Units 08/12/24  1406 08/12/24  1405   BLOOD CULTURE   --  Received  in Microbiology Lab. Culture in Progress.  Received in Microbiology Lab. Culture in Progress.   URINE CULTURE  70,000-79,000 cfu/ml Yeast*  --        Last 24 Hours Medication List:   Current Facility-Administered Medications   Medication Dose Route Frequency Provider Last Rate    atorvastatin  40 mg Oral Daily Michael Bowman      calcium carbonate-vitamin D  1 tablet Oral Every Other Day Michael Bowman      cefTRIAXone  1,000 mg Intravenous Q24H Michael Bowman 1,000 mg (08/12/24 1845)    dexamethasone  6 mg Intravenous Daily Geoffrey Alex DO      diltiazem  240 mg Oral Daily Michael Bowman      docusate sodium  100 mg Oral BID Michael Bowman      fluticasone  1 spray Each Nare BID Michael Bowman      levothyroxine  88 mcg Oral Early Morning Michael Bowman      lisinopril  20 mg Oral Daily Michael Bowman      metoprolol succinate  100 mg Oral BID Michael Bowman      mirtazapine  15 mg Oral HS Michael Bowman      ondansetron  4 mg Intravenous Once Marcial Ramirez DO      polyethylene glycol  17 g Oral Daily PRN Michael Bowman      remdesivir  100 mg Intravenous Q24H Michael Bowman      sertraline  50 mg Oral Daily Michael Bowman          Today, Patient Was Seen By: Geoffrey Alex DO    **Please Note: This note may have been constructed using a voice recognition system.**

## 2024-08-13 NOTE — CASE MANAGEMENT
Case Management Progress Note    Patient name Jennifer Patel  Location [facility] 633/[facility] 633-01 MRN [id number]  : 1935 Date 2024       LOS (days): 1  Geometric Mean LOS (GMLOS) (days): 3.3  Days to GMLOS:2.4        OBJECTIVE:        Current admission status: Inpatient  Preferred Pharmacy:   [facility] [place], [place] - [address]  [address]  [facility] [address]  Phone: [phone] Fax: [phone]    Primary Care Provider: Curtis Jackson MD    Primary Insurance: MEDICARE  Secondary Insurance: [employer]    PROGRESS NOTE:    This CM contacted [facility] to obtain background information on level of care. Nurse was unavailable to provide information. CM provided contact information to return call. CM was able to obtain contact for christina(son) Home [phone] cell [phone]. Christina was unavailable CM left voicemail to return call.

## 2024-08-13 NOTE — DISCHARGE INSTR - OTHER ORDERS
Skin care plans:  1-Cleanse sacral/buttocks and B/L heels with soap and water. Apply Silicone bordered foam, rudolph P for prevention, and change every 3 days and PRN soilage/displacement  2-Elevate heels to offload pressure.  3-Ehob cushion in chair when out of bed.  4-Moisturize skin daily with skin nourishing cream.  5-Turn/reposition q2h or when medically stable for pressure re-distribution on skin.

## 2024-08-13 NOTE — ASSESSMENT & PLAN NOTE
Patient sent to ED from [facility] noting with lethargy, fever, and vomiting  Reported temperature of 101.2 today  Patient also noted to have new oxygen need  She was brought to the ED where she was found to have infiltrate on x-ray and COVID-19 positive.  CT chest showing bilateral pleural effusion with some groundglass appearing infiltrates  Currently requiring 2 L O2 via nasal cannula  At present would classify this as mild disease.  Cover with remdesivir to prevent progression  I am unsure the utility of checking D-dimer and inflammatory markers inpatient with active cystitis and chronic cholecystitis.  Will hold off for now

## 2024-08-13 NOTE — PLAN OF CARE
Problem: PAIN - ADULT  Goal: Verbalizes/displays adequate comfort level or baseline comfort level  Description: Interventions:  - Encourage patient to monitor pain and request assistance  - Assess pain using appropriate pain scale  - Administer analgesics based on type and severity of pain and evaluate response  - Implement non-pharmacological measures as appropriate and evaluate response  - Consider cultural and social influences on pain and pain management  - Notify physician/advanced practitioner if interventions unsuccessful or patient reports new pain  Outcome: Progressing     Problem: INFECTION - ADULT  Goal: Absence or prevention of progression during hospitalization  Description: INTERVENTIONS:  - Assess and monitor for signs and symptoms of infection  - Monitor lab/diagnostic results  - Monitor all insertion sites, i.e. indwelling lines, tubes, and drains  - Monitor endotracheal if appropriate and nasal secretions for changes in amount and color  - [place] appropriate cooling/warming therapies per order  - Administer medications as ordered  - Instruct and encourage patient and family to use good hand hygiene technique  - Identify and instruct in appropriate isolation precautions for identified infection/condition  Outcome: Progressing  Goal: Absence of fever/infection during neutropenic period  Description: INTERVENTIONS:  - Monitor WBC    Outcome: Progressing     Problem: SAFETY ADULT  Goal: Patient will remain free of falls  Description: INTERVENTIONS:  - Educate patient/family on patient safety including physical limitations  - Instruct patient to call for assistance with activity   - Consult OT/PT to assist with strengthening/mobility   - Keep Call bell within reach  - Keep bed low and locked with side rails adjusted as appropriate  - Keep care items and personal belongings within reach  - Initiate and maintain comfort rounds  - Make Fall Risk Sign visible to staff  - Offer Toileting every 2 Hours,  in advance of need  - Initiate/Maintain alarm  - Obtain necessary fall risk management equipment:   - Apply yellow socks and bracelet for high fall risk patients  - Consider moving patient to room near nurses station  Outcome: Progressing  Goal: Maintain or return to baseline ADL function  Description: INTERVENTIONS:  -  Assess patient's ability to carry out ADLs; assess patient's baseline for ADL function and identify physical deficits which impact ability to perform ADLs (bathing, care of mouth/teeth, toileting, grooming, dressing, etc.)  - Assess/evaluate cause of self-care deficits   - Assess range of motion  - Assess patient's mobility; develop plan if impaired  - Assess patient's need for assistive devices and provide as appropriate  - Encourage maximum independence but intervene and supervise when necessary  - Involve family in performance of ADLs  - Assess for home care needs following discharge   - Consider OT consult to assist with ADL evaluation and planning for discharge  - Provide patient education as appropriate  Outcome: Progressing  Goal: Maintains/Returns to pre admission functional level  Description: INTERVENTIONS:  - Perform AM-PAC 6 Click Basic Mobility/ Daily Activity assessment daily.  - Set and communicate daily mobility goal to care team and patient/family/caregiver.   - Collaborate with rehabilitation services on mobility goals if consulted  - Perform Range of Motion 3 times a day.  - Reposition patient every 2 hours.  - Dangle patient 3 times a day  - Stand patient 3 times a day  - Ambulate patient 3 times a day  - Out of bed to chair 3 times a day   - Out of bed for meals 3 times a day  - Out of bed for toileting  - Record patient progress and toleration of activity level   Outcome: Progressing     Problem: DISCHARGE PLANNING  Goal: Discharge to home or other facility with appropriate resources  Description: INTERVENTIONS:  - Identify barriers to discharge w/patient and caregiver  -  Arrange for needed discharge resources and transportation as appropriate  - Identify discharge learning needs (meds, wound care, etc.)  - Arrange for interpretive services to assist at discharge as needed  - Refer to Case Management Department for coordinating discharge planning if the patient needs post-hospital services based on physician/advanced practitioner order or complex needs related to functional status, cognitive ability, or social support system  Outcome: Progressing     Problem: Knowledge Deficit  Goal: Patient/family/caregiver demonstrates understanding of disease process, treatment plan, medications, and discharge instructions  Description: Complete learning assessment and assess knowledge base.  Interventions:  - Provide teaching at level of understanding  - Provide teaching via preferred learning methods  Outcome: Progressing

## 2024-08-13 NOTE — ASSESSMENT & PLAN NOTE
[facility] she is s/p percutaneous cholecystostomy tube placement  CT abd and pelvis in the ed demonstrating Cholelithiasis with pericholecystic inflammation suggestive of cholecystitis, possibly chronic noting similar findings on prior study. The gallbladder is again relatively decompressed with cholecystostomy tube terminating external to the gallbladder   lumen.  Recently completed course of amoxicillin  Continue routine cholecystostomy care and plan for outpatient surgery followup

## 2024-08-13 NOTE — ASSESSMENT & PLAN NOTE
Patient noted to have hemoglobin 8.7 on admission, this is noted to be reduced from recent blood work from around 2 days ago with hemoglobins ranging from 10-12.  Care everywhere has a CBC ordered from [facility] showing hgb 8.6 on 8/9  Fortunately likely less concerning for acute bleed  Patient noted to be Hemoccult positive in the ED, CT showing constipation with large stool burden  Hold Eliquis in the setting of anemia, at present suspect that she may have some GI blood loss possibly in the form hemorrhoid, fissure, or stercoral ulcer in the setting of large stool burden noted on CT  Urinalysis and Urine micro does note large blood,, however after Akins was placed, urine does not appear to be grossly bloody.  Suspect that anemia secondary to gross hematuria would be more clinically apparent, and patient seems like a reasonable enough historian to notice this

## 2024-08-13 NOTE — WOUND OSTOMY CARE
Consult Note - Wound   Jennifer J Jorge 89 y.o. female MRN: [id number]  Unit/Bed#: [facility] 633-01 Encounter: [id number]      Assessment Findings:   Patient seen today for wound care follow up assessment. Patient is min assist with turning from side to side and continent.     Sacral/buttocks and B/L heels intact and blanching, preventative skin care orders placed.       Orders listed below and wound care will sign off, call or secure chat with questions. Bedside nurse updated of findings and orders. Photos below and in media.    Skin care plans:  1-Cleanse sacral/buttocks and B/L heels with soap and water. Apply Silicone bordered foam, rudolph P for prevention, and change every 3 days and PRN soilage/displacement  2-Elevate heels to offload pressure.  3-Ehob cushion in chair when out of bed.  4-Moisturize skin daily with skin nourishing cream.  5-Turn/reposition q2h or when medically stable for pressure re-distribution on skin.     Wounds:  Wound 08/12/24 Sacrum (Active)   Wound Image   08/13/24 1125             Bonita CARDOSON, RN, CWOCN

## 2024-08-13 NOTE — ASSESSMENT & PLAN NOTE
Lab Results   Component Value Date    EGFR 93 08/13/2024    EGFR 85 08/12/2024    EGFR 86 08/09/2024    CREATININE 0.40 (L) 08/13/2024    CREATININE 0.52 (L) 08/12/2024    CREATININE 0.60 08/09/2024   Renal function appears baseline, continue to monitor  Repeat BMP in the morning

## 2024-08-13 NOTE — CASE MANAGEMENT
Case Management Assessment & Discharge Planning Note    Patient name Jennifer Patel  Location [facility] 633/[facility] 633-01 MRN [id number]  : 1935 Date 2024       Current Admission Date: 2024  Current Admission Diagnosis:COVID   Patient Active Problem List    Diagnosis Date Noted Date Diagnosed    COVID 2024     Constipation 2024     Anemia 2024     Thrombocytopenia (HCC) 2024     Nausea and vomiting 2024     Cystitis 2024     Elevated troponin 2024     Electrolyte abnormality 2024     Abnormal urinalysis 2024     Severe protein-calorie malnutrition (HCC) 2024     Unsteadiness on feet 2024     Stage 3 chronic kidney disease (HCC) 2024     Failure to thrive in adult 2024     Cholecystitis 2024     Paroxysmal atrial fibrillation with RVR (HCC) 2023     Coronary artery disease involving native coronary artery of native heart without angina pectoris 10/19/2018     Depressive disorder 2008     Benign essential hypertension 2007       LOS (days): 1  Geometric Mean LOS (GMLOS) (days): 3.3  Days to GMLOS:2.4     OBJECTIVE:  PATIENT READMITTED TO HOSPITAL  Risk of Unplanned Readmission Score: 19.62   Current admission status: Inpatient       Preferred Pharmacy:   [facility] [place] [place] - [address]  [address] [facility] [address]  Phone: [phone] Fax: [phone]    Primary Care Provider: Curtis Jackson MD    Primary Insurance: MEDICARE  Secondary Insurance: CONT[employer]AL LIFE    ASSESSMENT:  Active Health Care Proxies       Juan Manuel Patel Alternate Health Care Representative - Son   Primary Phone: [phone] (Mobile)  Home Phone: [phone]                 Advance Directives  Does patient have a Health Care POA?: Yes  Does patient have Advance Directives?: Yes  Advance Directives: Living will, Power of  for health care  Primary Contact:  Juan Manuel Patel    Readmission Root Cause  30 Day Readmission: No, Yes  Who directed you to return to the hospital?: Other (comment) (Facility)  Did you understand whom to contact if you had questions or problems?: Yes  Did you get your prescriptions before you left the hospital?: No  Reason:: Preference for own pharmacy  Were you able to get your prescriptions filled when you left the hospital?: Yes  Did you take your medications as prescribed?: Yes  Were you able to get to your follow-up appointments?: Yes  During previous admission, was a post-acute recommendation made?: Yes  What post-acute resources were offered?: STR  Patient was readmitted due to: COVID-19  Action Plan: medical stability    Patient Information  Admitted from:: Facility  Mental Status: Confused  During Assessment patient was accompanied by: Son  Assessment information provided by:: Son  Primary Caregiver: Child  Caregiver's Name:: Juan Manuel Patel  Caregiver's Telephone Number:: [phone]  Support Systems: Spouse/significant other, Son, Self  County of Residence: [place]  What city do you live in?: [place]  Home entry access options. Select all that apply.: Stairs  Number of steps to enter home.: 1  Do the steps have railings?: No  Type of Current Residence: Merged with [facility]  Living Arrangements: Lives w/ Son, Lives w/ Spouse/significant other  Is patient a ?: No    Activities of Daily Living Prior to Admission  Functional Status: Independent  Completes ADLs independently?: Yes  Ambulates independently?: No  Level of ambulatory dependence: Assistance  Does patient use assisted devices?: Yes  Assisted Devices (DME) used: Wheelchair, Walker, Straight Cane, Shower Chair  Does patient currently own DME?: Yes  Does patient have a history of Outpatient Therapy (PT/OT)?: No  Does the patient have a history of Short-Term Rehab?: Yes ([facility])  Does patient have a history of HHC?: Yes ([facility])  Does patient currently have HHC?: No      Patient  Information Continued  Income Source: SSI/SSD  Does patient have prescription coverage?: Yes  Does patient receive dialysis treatments?: No  Does patient have a history of substance abuse?: No  Does patient have a history of Mental Health Diagnosis?: No    Means of Transportation  Means of Transport to Appts:: Family transport  Social Determinants of Health (SDOH)      Flowsheet Row Most Recent Value   Housing Stability    In the last 12 months, was there a time when you were not able to pay the mortgage or rent on time? N   At any time in the past 12 months, were you homeless or living in a shelter (including now)? N   Transportation Needs    In the past 12 months, has lack of transportation kept you from medical appointments or from getting medications? no   In the past 12 months, has lack of transportation kept you from meetings, work, or from getting things needed for daily living? No   Food Insecurity    Within the past 12 months, you worried that your food would run out before you got the money to buy more. Never true   Within the past 12 months, the food you bought just didn't last and you didn't have money to get more. Never true   Utilities    In the past 12 months has the electric, gas, oil, or water company threatened to shut off services in your home? No        DISCHARGE DETAILS:    Discharge planning discussed with:: Juan Manuel (son)  Freedom of Choice: Yes  Comments - Freedom of Choice: Discussed FOC  CM contacted family/caregiver?: Yes (Juan Manuel (son) via TC)  Were Treatment Team discharge recommendations reviewed with patient/caregiver?: Yes  Did patient/caregiver verbalize understanding of patient care needs?: N/A- going to facility  Were patient/caregiver advised of the risks associated with not following Treatment Team discharge recommendations?: Yes    Other Referral/Resources/Interventions Provided:  Interventions: SNF  Referral Comments: prior to admission pt was receiving STR at [facility].  referral sent for resumption of care awaiting response. Pt lives at home with son and will return when she finishes STR.  CM reviewed d/c planning process including the following: identifying help at home, patient preference for d/c planning needs, Discharge Lounge, [facility] Meds to Bed program, availability of treatment team to discuss questions or concerns patient and/or family may have regarding understanding medications and recognizing signs and symptoms once discharged.  CM also encouraged patient to follow up with all recommended appointments after discharge. Patient advised of importance for patient and family to participate in managing patient’s medical well being.  Pt is a 30 day readmission. Please see open from 7/30/24

## 2024-08-14 LAB
BASOPHILS # BLD AUTO: 0 THOUSANDS/ÂΜL (ref 0–0.1)
BASOPHILS NFR BLD AUTO: 0 % (ref 0–1)
EOSINOPHIL # BLD AUTO: 0 THOUSAND/ÂΜL (ref 0–0.61)
EOSINOPHIL NFR BLD AUTO: 0 % (ref 0–6)
ERYTHROCYTE [DISTWIDTH] IN BLOOD BY AUTOMATED COUNT: 15.2 % (ref 11.6–15.1)
HCT VFR BLD AUTO: 33.4 % (ref 34.8–46.1)
HGB BLD-MCNC: 10 G/DL (ref 11.5–15.4)
IMM GRANULOCYTES # BLD AUTO: 0.01 THOUSAND/UL (ref 0–0.2)
IMM GRANULOCYTES NFR BLD AUTO: 1 % (ref 0–2)
LYMPHOCYTES # BLD AUTO: 0.69 THOUSANDS/ÂΜL (ref 0.6–4.47)
LYMPHOCYTES NFR BLD AUTO: 32 % (ref 14–44)
MCH RBC QN AUTO: 31.5 PG (ref 26.8–34.3)
MCHC RBC AUTO-ENTMCNC: 29.9 G/DL (ref 31.4–37.4)
MCV RBC AUTO: 105 FL (ref 82–98)
MONOCYTES # BLD AUTO: 0.1 THOUSAND/ÂΜL (ref 0.17–1.22)
MONOCYTES NFR BLD AUTO: 5 % (ref 4–12)
NEUTROPHILS # BLD AUTO: 1.36 THOUSANDS/ÂΜL (ref 1.85–7.62)
NEUTS SEG NFR BLD AUTO: 62 % (ref 43–75)
NRBC BLD AUTO-RTO: 0 /100 WBCS
PLATELET # BLD AUTO: 137 THOUSANDS/UL (ref 149–390)
PMV BLD AUTO: 10.5 FL (ref 8.9–12.7)
RBC # BLD AUTO: 3.17 MILLION/UL (ref 3.81–5.12)
WBC # BLD AUTO: 2.16 THOUSAND/UL (ref 4.31–10.16)

## 2024-08-14 PROCEDURE — 85025 COMPLETE CBC W/AUTO DIFF WBC: CPT | Performed by: INTERNAL MEDICINE

## 2024-08-14 PROCEDURE — 99232 SBSQ HOSP IP/OBS MODERATE 35: CPT | Performed by: INTERNAL MEDICINE

## 2024-08-14 PROCEDURE — 97167 OT EVAL HIGH COMPLEX 60 MIN: CPT

## 2024-08-14 PROCEDURE — 97163 PT EVAL HIGH COMPLEX 45 MIN: CPT

## 2024-08-14 RX ADMIN — Medication 1 TABLET: at 09:37

## 2024-08-14 RX ADMIN — DOCUSATE SODIUM 100 MG: 100 CAPSULE, LIQUID FILLED ORAL at 09:37

## 2024-08-14 RX ADMIN — LEVOTHYROXINE SODIUM 88 MCG: 88 TABLET ORAL at 05:26

## 2024-08-14 RX ADMIN — METOPROLOL SUCCINATE 100 MG: 100 TABLET, EXTENDED RELEASE ORAL at 21:11

## 2024-08-14 RX ADMIN — CEFTRIAXONE SODIUM 1000 MG: 10 INJECTION, POWDER, FOR SOLUTION INTRAVENOUS at 18:03

## 2024-08-14 RX ADMIN — ATORVASTATIN CALCIUM 40 MG: 40 TABLET, FILM COATED ORAL at 09:37

## 2024-08-14 RX ADMIN — APIXABAN 2.5 MG: 2.5 TABLET, FILM COATED ORAL at 18:03

## 2024-08-14 RX ADMIN — MIRTAZAPINE 15 MG: 15 TABLET, FILM COATED ORAL at 21:06

## 2024-08-14 RX ADMIN — LISINOPRIL 20 MG: 20 TABLET ORAL at 09:37

## 2024-08-14 RX ADMIN — REMDESIVIR 100 MG: 100 INJECTION, POWDER, LYOPHILIZED, FOR SOLUTION INTRAVENOUS at 19:50

## 2024-08-14 RX ADMIN — METOPROLOL SUCCINATE 100 MG: 100 TABLET, EXTENDED RELEASE ORAL at 09:37

## 2024-08-14 RX ADMIN — APIXABAN 2.5 MG: 2.5 TABLET, FILM COATED ORAL at 13:05

## 2024-08-14 RX ADMIN — DEXAMETHASONE SODIUM PHOSPHATE 6 MG: 10 INJECTION INTRAMUSCULAR; INTRAVENOUS at 09:37

## 2024-08-14 RX ADMIN — SERTRALINE HYDROCHLORIDE 50 MG: 50 TABLET ORAL at 09:37

## 2024-08-14 RX ADMIN — DOCUSATE SODIUM 100 MG: 100 CAPSULE, LIQUID FILLED ORAL at 18:03

## 2024-08-14 RX ADMIN — DILTIAZEM HYDROCHLORIDE 240 MG: 240 CAPSULE, EXTENDED RELEASE ORAL at 09:37

## 2024-08-14 NOTE — ASSESSMENT & PLAN NOTE
Patient sent to ED from [facility] noting with lethargy, fever, and vomiting  Reported temperature of 101.2 today  Patient also noted to have new oxygen need  She was brought to the ED where she was found to have infiltrate on x-ray and COVID-19 positive.  CT chest showing bilateral pleural effusion with some groundglass appearing infiltrates  Patient is comfortable on room air  At present would classify this as mild disease.  Cover with remdesivir to prevent progression  I am unsure the utility of checking D-dimer and inflammatory markers inpatient with active cystitis and chronic cholecystitis.  Will hold off for now

## 2024-08-14 NOTE — ASSESSMENT & PLAN NOTE
Troponin in the ED elevated to 109->74  Patient without chest pain and EKG does not appear to show any acute ischemic appearing changes  Suspect this is some demand ischemia in the setting of COVID infection  Monitor on telemetry for 24 hours  Resume eliquis

## 2024-08-14 NOTE — OCCUPATIONAL THERAPY NOTE
Occupational Therapy Evaluation     Patient Name: Jennifer Patel  Today's Date: 8/14/2024  Problem List  Principal Problem:    COVID  Active Problems:    Benign essential hypertension    Stage 3 chronic kidney disease (HCC)    Paroxysmal atrial fibrillation with RVR (HCC)    Cholecystitis    Constipation    Anemia    Thrombocytopenia (HCC)    Nausea and vomiting    Cystitis    Elevated troponin    Past Medical History  Past Medical History:   Diagnosis Date    Disease of thyroid gland     Hyperlipidemia     Hypertension     MI (myocardial infarction) (HCC) 04/2018    Tubal pregnancy      Past Surgical History  Past Surgical History:   Procedure Laterality Date    HYSTERECTOMY           08/14/24 1040   OT Last Visit   OT Visit Date 08/14/24   Note Type   Note type Evaluation   Pain Assessment   Pain Assessment Tool 0-10   Pain Score No Pain   Patient's Stated Pain Goal No pain   Hospital Pain Intervention(s) Repositioned;Ambulation/increased activity;Emotional support   Restrictions/Precautions   Weight Bearing Precautions Per Order No   Other Precautions (S)  Cognitive;Chair Alarm;Bed Alarm;Multiple lines;Fall Risk;Pain;Contact/isolation;Airborne/isolation  (COVID-19+)   Home Living   Type of Home House   Home Layout One level;Stairs to enter with rails  (1 PREETI)   Bathroom Shower/Tub Tub/shower unit   Bathroom Toilet Standard   Bathroom Equipment Grab bars in shower;Shower chair;Commode   Bathroom Accessibility Accessible   Home Equipment Wheelchair-manual;Walker   Additional Comments PT REPORTS BEING FROM THE ABOVE HOME SET-UP WITH USE OF RW FOR SHORT DISTANCES AND W/C FOR COMMUNITY DISTANCES. HOWEVER, PT ADMITTED FROM [facility] WHERE SHE WAS RECEIVING INPT REHAB SERVICES PTA   Prior Function   Level of [place] Independent with ADLs;Independent with functional mobility   Lives With Spouse;Son   Receives Help From Family;Personal care attendant   IADLs Family/Friend/Other provides  transportation;Family/Friend/Other provides meals;Family/Friend/Other provides medication management   Falls in the last 6 months 1 to 4   Vocational Retired   Lifestyle   Autonomy PT REPORTS BEING I WITH ADLS/LT IADLS AT BASELINE- REQUIRED ADDITIONAL ASSIST WHILE AT REHAB   Reciprocal Relationships LIVES WITH SUPPORTIVE SPOUSE AND SON.   Service to Others RETIRED   Intrinsic Gratification ENJOYS SPENDING TIME WITH FAMILY.   ADL   Eating Assistance 5  Supervision/Setup   Grooming Assistance 5  Supervision/Setup   UB Bathing Assistance 4  Minimal Assistance   LB Bathing Assistance 3  Moderate Assistance   UB Dressing Assistance 4  Minimal Assistance   LB Dressing Assistance 3  Moderate Assistance   Toileting Assistance  3  Moderate Assistance   Functional Assistance 3  Moderate Assistance   Bed Mobility   Supine to Sit 3  Moderate assistance   Additional items Assist x 1;Increased time required;Verbal cues;LE management   Sit to Supine Unable to assess   Additional Comments PT LEFT OOB WITH ALL NEEDS IN REACH + CHAIR ALARM ACTIVATED.   Transfers   Sit to Stand 3  Moderate assistance   Additional items Assist x 1;Increased time required;Verbal cues   Stand to Sit 3  Moderate assistance   Additional items Assist x 1;Increased time required;Verbal cues   Functional Mobility   Functional Mobility 3  Moderate assistance   Additional items Rolling walker   Balance   Static Sitting Fair -   Static Standing Poor   Ambulatory Poor   Activity Tolerance   Activity Tolerance Patient limited by fatigue;Patient limited by pain   Medical Staff Made Aware PT SEEN FOR CO-EVAL WITH SKILLED PHYSICAL THERAPIST 2' NEW PRECAUTIONS/LIMITATIONS, AND LIMITED ACTIVITY TOLERANCE WHICH IMPACT PERFORMANCE AND ARE A REGRESSION FROM PT'S BASELINE.   Nurse Made Aware APPROPRIATE TO SEE PER RN.   RUE Assessment   RUE Assessment WFL   LUE Assessment   LUE Assessment WFL   Hand Function   Gross Motor Coordination Functional   Fine Motor Coordination  Functional   Psychosocial   Psychosocial (WDL) WDL   Cognition   Overall Cognitive Status WFL   Arousal/Participation Alert;Cooperative   Attention Attends with cues to redirect   Orientation Level Oriented X4   Memory Decreased short term memory;Decreased recall of recent events   Following Commands Follows one step commands without difficulty   Comments PT IS PLEASANT AND COOPERATIVE. ALARM ON FOR SAFETY   Assessment   Limitation Decreased ADL status;Decreased Safe judgement during ADL;Decreased cognition;Decreased endurance;Decreased self-care trans;Decreased high-level ADLs   Prognosis Good   Assessment 89 YO Female SEEN FOR INITIAL OCCUPATIONAL THERAPY EVALUATION FOLLOWING ADMISSION TO [facility] WITH CONFUSION, FEVER AND HYPOXIA, FOUND TO BE COVID-19+. PROBLEMS LIST/PMH INCLUDES ELEVATED TROPONIN, CYSTITIS, N/V, THROMBOCYTOPENIA, ANEMIA, CONSTIPATION, CHOLECYSTITIS S/P PERCUTANEOUS CHOLECYSTOSTOMY TUBE PLACEMENT, A-FIB WITH RVR, CKD, Disease of thyroid gland, Hyperlipidemia, Hypertension, MI (myocardial infarction) (HCC), and Tubal pregnancy. PT IS FROM HOME WITH FAMILY WHERE SHE REPORTS BEING INDEPENDENT WITH ADLS/LT IADLS AT BASELINE HOWEVER ADMITTED FROM [facility] WHERE SHE WAS RECEIVING REHAB SERVICES FOLLOWING RECENT ADMISSION FOR FTT. PT CURRENTLY REQUIRES OVERALL MOD A WITH ADLS, TRANSFERS AND FUNCTIONAL MOBILITY WITH USE OF RW. PT IS LIMITED 2' FATIGUE, IMPAIRED BALANCE, FALL RISK , OVERALL WEAKNESS/DECONDITIONING , and OVERALL LIMITED ACTIVITY TOLERANCE. PT EDUCATED ON DEEP BREATHING TECHNIQUES T/O ACTIVITY, SLOWING OF PACE, ENERGY CONSERVATION TECHNIQUES FOR CARRY OVER UPON D/C, INCREASED FAMILY SUPPORT, and CONTINUE PARTICIPATION IN SELF-CARE/MOBILITY WITH STAFF WHILE IN THE HOSPITAL . The patient's raw score on the AM-PAC Daily Activity Inpatient Short Form is 15. A raw score of less than 19 suggests the patient may benefit from discharge to post-acute rehabilitation services.  Please refer to the recommendation of the Occupational Therapist for safe discharge planning. FROM AN OCCUPATIONAL THERAPY PERSPECTIVE, RECOMMEND LEVEL II RESOURCES UPON D/C. WILL CONT TO FOLLOW TO ADDRESS THE BELOW DESCRIBED GOALS.   Goals   Patient Goals TO GET BETTER   LTG Time Frame 10-14   Long Term Goal #1 SEE BELOW   Plan   Treatment Interventions ADL retraining;Functional transfer training;Endurance training;Cognitive reorientation;Patient/family training;Equipment evaluation/education;Compensatory technique education;Energy conservation;Activityengagement   Goal Expiration Date 08/28/24   OT Frequency 2-3x/wk   Discharge Recommendation   Rehab Resource Intensity Level, OT II (Moderate Resource Intensity)   AM-PAC Daily Activity Inpatient   Lower Body Dressing 2   Bathing 2   Toileting 2   Upper Body Dressing 3   Grooming 3   Eating 3   Daily Activity Raw Score 15   Daily Activity Standardized Score (Calc for Raw Score >=11) 34.69   AM-PAC Applied Cognition Inpatient   Following a Speech/Presentation 3   Understanding Ordinary Conversation 4   Taking Medications 3   Remembering Where Things Are Placed or Put Away 3   Remembering List of 4-5 Errands 3   Taking Care of Complicated Tasks 3   Applied Cognition Raw Score 19   Applied Cognition Standardized Score 39.77       OCCUPATIONAL THERAPY GOALS TO BE MET WITHIN 14 DAYS:    -Pt will increase bed mobility to MOD I to participate in functional activities with G tolerance and balance.  -Pt will improve functional mobility and transfers to MOD I on/off all surfaces w/ G balance/safety including toileting.  -Pt will increase independence in all ADLS to MOD I with G balance sitting upright in chair.  -Pt will improve activity tolerance to G for 30 min txment sessions w/ G carry over of learned energy conservation techniques.  -Pt will improve independence in lt homemaking activities to MOD I without requiring cues for safety.  -Pt will demonstrate G carryover of  learned safety techniques and proper body mechanics in functional and leisure activities with use of DME.  -Pt will complete additional cognitive assessment with 100% attention to task in order to assist with safe d/c plan.       Documentation completed by ROSE Del Valle, OTR/L  [facility] Certified ID# [id number]

## 2024-08-14 NOTE — PHYSICAL THERAPY NOTE
Physical Therapy Evaluation     Patient's Name: Jennifer Patel    Admitting Diagnosis  Hyperlipidemia [E78.5]  Pulmonary infiltrate [R91.8]  Pulmonary edema [J81.1]  Hypothyroidism [E03.9]  Hypertension [I10]  Nausea and vomiting [R11.2]  Fever [R50.9]  Atrial fibrillation, unspecified type (HCC) [I48.91]  COVID-19 [U07.1]    Problem List  Patient Active Problem List   Diagnosis    Benign essential hypertension    Stage 3 chronic kidney disease (HCC)    Coronary artery disease involving native coronary artery of native heart without angina pectoris    Depressive disorder    Paroxysmal atrial fibrillation with RVR (HCC)    Cholecystitis    Failure to thrive in adult    Electrolyte abnormality    Abnormal urinalysis    Severe protein-calorie malnutrition (HCC)    Unsteadiness on feet    COVID    Constipation    Anemia    Thrombocytopenia (HCC)    Nausea and vomiting    Cystitis    Elevated troponin       Past Medical History  Past Medical History:   Diagnosis Date    Disease of thyroid gland     Hyperlipidemia     Hypertension     MI (myocardial infarction) (HCC) 04/2018    Tubal pregnancy        Past Surgical History  Past Surgical History:   Procedure Laterality Date    HYSTERECTOMY            08/14/24 1041   PT Last Visit   PT Visit Date 08/14/24   Note Type   Note type Evaluation   Pain Assessment   Pain Assessment Tool 0-10   Pain Score No Pain   Hospital Pain Intervention(s) Repositioned   Restrictions/Precautions   Weight Bearing Precautions Per Order No   Other Precautions Contact/isolation;Airborne/isolation;Chair Alarm;Bed Alarm;Multiple lines;Telemetry;O2;Fall Risk  (COVID-19 Positive.)   Home Living   Type of Home House   Home Layout One level;Performs ADLs on one level;Able to live on main level with bedroom/bathroom;Stairs to enter with rails  (1 PREETI)   Bathroom Shower/Tub Tub/shower unit   Bathroom Toilet Standard   Bathroom Equipment Grab bars in shower;Shower chair;Commode   Bathroom  Accessibility Accessible   Home Equipment Wheelchair-manual;Walker   Additional Comments Pt lives in above set up per chart where she primarily uses WC for functional mobility. She is able to walk short distances with RW to WC. Currently, pt has been at [facility] for rehab.   Prior Function   Level of [place] Needs assistance with ADLs;Needs assistance with functional mobility;Needs assistance with IADLS   Lives With Spouse;Alone   Receives Help From Family;Personal care attendant   IADLs Family/Friend/Other provides transportation;Family/Friend/Other provides meals;Family/Friend/Other provides medication management   Falls in the last 6 months 1 to 4   Vocational Retired   General   Family/Caregiver Present No   Cognition   Overall Cognitive Status WFL   Arousal/Participation Alert   Orientation Level Oriented X4   Memory Within functional limits   Following Commands Follows one step commands without difficulty   Subjective   Subjective Pt is pleasant and agreeable to mobilize.   RLE Assessment   RLE Assessment WFL   Strength RLE   RLE Overall Strength 3+/5  (Through functional assessment.)   LLE Assessment   LLE Assessment WFL   Strength LLE   LLE Overall Strength 3+/5  (Through functional assessment.)   Bed Mobility   Supine to Sit 3  Moderate assistance   Additional items Assist x 1;Increased time required   Transfers   Sit to Stand 3  Moderate assistance   Additional items Increased time required;Verbal cues   Stand to Sit 3  Moderate assistance   Additional items Assist x 1;Increased time required;Verbal cues   Additional Comments Pt required RW for transfers.   Ambulation/Elevation   Gait pattern Forward Flexion;Narrow ARI;Decreased foot clearance;Shuffling;Short stride;Step to;Excessively slow   Gait Assistance 3  Moderate assist   Additional items Assist x 1;Verbal cues   Assistive Device Rolling walker   Distance 5 Steps to bedside chair.   Stair Management Assistance Not tested   Balance    Static Sitting Fair -   Dynamic Sitting Poor +   Static Standing Poor   Dynamic Standing Poor   Ambulatory Poor   Endurance Deficit   Endurance Deficit Yes   Endurance Deficit Description Fatigue and generalized weakness.   Activity Tolerance   Activity Tolerance Patient limited by fatigue;Patient limited by pain   Medical Staff Made Aware PT CAMELIA Burns.   Nurse Made Aware Yes.   Assessment   Prognosis Good   Problem List Decreased strength;Decreased range of motion;Decreased endurance;Impaired balance;Decreased mobility;Pain   Assessment Pt is 89 y.o. female seen for PT evaluation s/p admission to [facility] on 8/12/2024 with primary dx of COVID-19. Other active problems include elevated troponin, cystitis, nausea and vomiting, thrombocytopenia, anemia, constipation, cholecystitis, paroxysmal atrial fibrillation with RVR, and benign essential hypertension. Pt  has a past medical history of Disease of thyroid gland, Hyperlipidemia, Hypertension, MI (myocardial infarction) (HCC), and Tubal pregnancy. Pt seen for high complexity PT evaluation due to ongoing medical management of primary diagnoses, decreased activity level compared to baseline, increased reliance on more restrictive assisted device, continuous telemetry monitoring, continuous pulse ox monitoring, and COVID positive at current time . At baseline, pt lives with spouse and son. However pt [facility]. Upon evaluation, pt is Mod A x1 for bed mobility, Mod A x1 for transfers, Mod A x1 for ambulation with RW. Other impairments include Decreased activity tolerance, Decreased strength, Decreased range of motion, decreased endurance, impaired balance, and pain. These impairments increase pt's fall risk and limit pt's ability to perform self care and navigate the home and community. At the conclusion of PT evaluation, pt was seated in bedside chair, chair alarm activated, and all things within reach. Pt requires  continued acute care PT services to address aforementioned impairments and increase functional mobility. PT d/c recommendation is level II moderate resource intensity.   Goals   Patient Goals To get better.   STG Expiration Date 08/28/24   Short Term Goal #1 In 14 days, pt will complete, S for bed mobility to increase functional mobility and decrease caregiver burden, 2) S for transfers to increase functional mobility and reduce fall risk, 3) increase strength by 1 grade to increase functional mobility and reduce fall risk, 4) increase all balance scores by 1/2 grade to increase functional mobility and reduce fall risk 5) S for ambulation of 300 feet with LRAD to increase functional mobility and reduce fall risk, 6) S for at least 1 stairs to increase functional mobility and reduce fall risk for home environment.   PT Treatment Day 0   Plan   Treatment/Interventions Functional transfer training;LE strengthening/ROM;Elevations;Therapeutic exercise;Endurance training;Bed mobility;Gait training;Spoke to nursing;OT   PT Frequency 3-5x/wk   Discharge Recommendation   Rehab Resource Intensity Level, PT II (Moderate Resource Intensity)   Equipment Recommended Walker;Wheelchair   Wheelchair Package Recommended Standard   AM-PAC Basic Mobility Inpatient   Turning in Flat Bed Without Bedrails 2   Lying on Back to Sitting on Edge of Flat Bed Without Bedrails 2   Moving Bed to Chair 2   Standing Up From Chair Using Arms 2   Walk in Room 2   Climb 3-5 Stairs With Railing 2   Basic Mobility Inpatient Raw Score 12   Basic Mobility Standardized Score 32.23   [facility] Highest Level Of Mobility   -[facility] Goal 4: Move to chair/commode   -[facility] Achieved 4: Move to chair/commode   Modified Rajesh Scale   Modified Rajesh Scale 4   End of Consult   Patient Position at End of Consult Bedside chair;Bed/Chair alarm activated;All needs within reach         KAM Landis

## 2024-08-14 NOTE — PLAN OF CARE
Problem: PAIN - ADULT  Goal: Verbalizes/displays adequate comfort level or baseline comfort level  Description: Interventions:  - Encourage patient to monitor pain and request assistance  - Assess pain using appropriate pain scale  - Administer analgesics based on type and severity of pain and evaluate response  - Implement non-pharmacological measures as appropriate and evaluate response  - Consider cultural and social influences on pain and pain management  - Notify physician/advanced practitioner if interventions unsuccessful or patient reports new pain  Outcome: Progressing     Problem: INFECTION - ADULT  Goal: Absence or prevention of progression during hospitalization  Description: INTERVENTIONS:  - Assess and monitor for signs and symptoms of infection  - Monitor lab/diagnostic results  - Monitor all insertion sites, i.e. indwelling lines, tubes, and drains  - Monitor endotracheal if appropriate and nasal secretions for changes in amount and color  - [place] appropriate cooling/warming therapies per order  - Administer medications as ordered  - Instruct and encourage patient and family to use good hand hygiene technique  - Identify and instruct in appropriate isolation precautions for identified infection/condition  Outcome: Progressing  Goal: Absence of fever/infection during neutropenic period  Description: INTERVENTIONS:  - Monitor WBC    Outcome: Progressing

## 2024-08-14 NOTE — PLAN OF CARE
Problem: PHYSICAL THERAPY ADULT  Goal: Performs mobility at highest level of function for planned discharge setting.  See evaluation for individualized goals.  Description: Treatment/Interventions: Functional transfer training, LE strengthening/ROM, Elevations, Therapeutic exercise, Endurance training, Bed mobility, Gait training, Spoke to nursing, OT  Equipment Recommended: Walker, Wheelchair       See flowsheet documentation for full assessment, interventions and recommendations.  Note: Prognosis: Good  Problem List: Decreased strength, Decreased range of motion, Decreased endurance, Impaired balance, Decreased mobility, Pain  Assessment: Pt is 89 y.o. female seen for PT evaluation s/p admission to [facility] on 8/12/2024 with primary dx of COVID-19. Other active problems include elevated troponin, cystitis, nausea and vomiting, thrombocytopenia, anemia, constipation, cholecystitis, paroxysmal atrial fibrillation with RVR, and benign essential hypertension. Pt  has a past medical history of Disease of thyroid gland, Hyperlipidemia, Hypertension, MI (myocardial infarction) (HCC), and Tubal pregnancy. Pt seen for high complexity PT evaluation due to ongoing medical management of primary diagnoses, decreased activity level compared to baseline, increased reliance on more restrictive assisted device, continuous telemetry monitoring, continuous pulse ox monitoring, and COVID positive at current time . At baseline, pt lives with spouse and son. However pt has been at [facility] for rehab. Upon evaluation, pt is Mod A x1 for bed mobility, Mod A x1 for transfers, Mod A x1 for ambulation with RW. Other impairments include Decreased activity tolerance, Decreased strength, Decreased range of motion, decreased endurance, impaired balance, and pain. These impairments increase pt's fall risk and limit pt's ability to perform self care and navigate the home and community. At the conclusion of PT evaluation,  pt was seated in bedside chair, chair alarm activated, and all things within reach. Pt requires continued acute care PT services to address aforementioned impairments and increase functional mobility. PT d/c recommendation is level II moderate resource intensity.        Rehab Resource Intensity Level, PT: II (Moderate Resource Intensity)    See flowsheet documentation for full assessment.

## 2024-08-14 NOTE — ASSESSMENT & PLAN NOTE
Platelet 117, appears lower than previously recorded plt counts from last hospitalization which was in the range of 200s-230s   CBC 3 days before admission in [facility] network noted plt count 121  Patient was recently hospitalized at our facility from 7/29-8 2, however it does not appear as though she received heparin products because she is on apixaban  Suspect this is consumptive in the setting of of COVID and possibly cystitis.  Low concern for TMA/DIC, however will continue to monitor with repeat coags and CBC in the morning

## 2024-08-14 NOTE — ASSESSMENT & PLAN NOTE
Patient noted to have hemoglobin 8.7 on admission, this is noted to be reduced from recent blood work from around 2 days ago with hemoglobins ranging from 10-12.  Care everywhere has a CBC ordered from [facility] showing hgb 8.6 on 8/9  Fortunately likely less concerning for acute bleed  Patient noted to be Hemoccult positive in the ED, CT showing constipation with large stool burden  Resume eliquis  Urinalysis and Urine micro does note large blood,, however after Akins was placed, urine does not appear to be grossly bloody.  Suspect that anemia secondary to gross hematuria would be more clinically apparent, and patient seems like a reasonable enough historian to notice this

## 2024-08-14 NOTE — PLAN OF CARE
Problem: OCCUPATIONAL THERAPY ADULT  Goal: Performs self-care activities at highest level of function for planned discharge setting.  See evaluation for individualized goals.  Description: Treatment Interventions: ADL retraining, Functional transfer training, Endurance training, Cognitive reorientation, Patient/family training, Equipment evaluation/education, Compensatory technique education, Energy conservation, Activityengagement          See flowsheet documentation for full assessment, interventions and recommendations.   Note: Limitation: Decreased ADL status, Decreased Safe judgement during ADL, Decreased cognition, Decreased endurance, Decreased self-care trans, Decreased high-level ADLs  Prognosis: Good  Assessment: 88 YO Female SEEN FOR INITIAL OCCUPATIONAL THERAPY EVALUATION FOLLOWING ADMISSION TO [facility] WITH CONFUSION, FEVER AND HYPOXIA, FOUND TO BE COVID-19+. PROBLEMS LIST/PMH INCLUDES ELEVATED TROPONIN, CYSTITIS, N/V, THROMBOCYTOPENIA, ANEMIA, CONSTIPATION, CHOLECYSTITIS S/P PERCUTANEOUS CHOLECYSTOSTOMY TUBE PLACEMENT, A-FIB WITH RVR, CKD, Disease of thyroid gland, Hyperlipidemia, Hypertension, MI (myocardial infarction) (HCC), and Tubal pregnancy. PT IS FROM HOME WITH FAMILY WHERE SHE REPORTS BEING INDEPENDENT WITH ADLS/LT IADLS AT BASELINE HOWEVER ADMITTED FROM [facility] WHERE SHE WAS RECEIVING REHAB SERVICES FOLLOWING RECENT ADMISSION FOR FTT. PT CURRENTLY REQUIRES OVERALL MOD A WITH ADLS, TRANSFERS AND FUNCTIONAL MOBILITY WITH USE OF RW. PT IS LIMITED 2' FATIGUE, IMPAIRED BALANCE, FALL RISK , OVERALL WEAKNESS/DECONDITIONING , and OVERALL LIMITED ACTIVITY TOLERANCE. PT EDUCATED ON DEEP BREATHING TECHNIQUES T/O ACTIVITY, SLOWING OF PACE, ENERGY CONSERVATION TECHNIQUES FOR CARRY OVER UPON D/C, INCREASED FAMILY SUPPORT, and CONTINUE PARTICIPATION IN SELF-CARE/MOBILITY WITH STAFF WHILE IN THE HOSPITAL . The patient's raw score on the AM-PAC Daily Activity Inpatient Short Form is 15.  A raw score of less than 19 suggests the patient may benefit from discharge to post-acute rehabilitation services. Please refer to the recommendation of the Occupational Therapist for safe discharge planning. FROM AN OCCUPATIONAL THERAPY PERSPECTIVE, RECOMMEND LEVEL II RESOURCES UPON D/C. WILL CONT TO FOLLOW TO ADDRESS THE BELOW DESCRIBED GOALS.     Rehab Resource Intensity Level, OT: II (Moderate Resource Intensity)

## 2024-08-14 NOTE — ASSESSMENT & PLAN NOTE
[facility] she is s/p percutaneous cholecystostomy tube placement  CT abd and pelvis in the ed demonstrating Cholelithiasis with pericholecystic inflammation suggestive of cholecystitis, possibly chronic noting similar findings on prior study. The gallbladder is again relatively decompressed with cholecystostomy tube terminating external to the gallbladder   lumen.  Recently completed course of amoxicillin  Continue routine cholecystostomy care and plan for outpatient surgery followup

## 2024-08-14 NOTE — PROGRESS NOTES
[facility]  Progress Note  Name: Jennifer Patel I  MRN: [id number]  [facility] Date of Admission: 8/12/2024   Date of Service: 8/14/2024 I Hospital Day: 2    Assessment & Plan   Elevated troponin  Assessment & Plan  Troponin in the ED elevated to 109->74  Patient without chest pain and EKG does not appear to show any acute ischemic appearing changes  Suspect this is some demand ischemia in the setting of COVID infection  Monitor on telemetry for 24 hours  Resume eliquis    Cystitis  Assessment & Plan  Urinalysis was concern for possible infection and CT showing thickened bladder wall concerning for cystitis  Akins placed in ED  Cover with Rocephin and monitor blood and urine cultures    Nausea and vomiting  Assessment & Plan  Likely secondary to COVID-19 infection   Zofran as needed, gentle hydration will be ordered  Cholecystostomy tube appears to be functioning properly, no abdominal tenderness    Thrombocytopenia (HCC)  Assessment & Plan  Platelet 117, appears lower than previously recorded plt counts from last hospitalization which was in the range of 200s-230s   CBC 3 days before admission in [facility] network noted plt count 121  Patient was recently hospitalized at our facility from 7/29-8 2, however it does not appear as though she received heparin products because she is on apixaban  Suspect this is consumptive in the setting of of COVID and possibly cystitis.  Low concern for TMA/DIC, however will continue to monitor with repeat coags and CBC in the morning    Anemia  Assessment & Plan  Patient noted to have hemoglobin 8.7 on admission, this is noted to be reduced from recent blood work from around 2 days ago with hemoglobins ranging from 10-12.  Care everywhere has a CBC ordered from [facility] showing hgb 8.6 on 8/9  Fortunately likely less concerning for acute bleed  Patient noted to be Hemoccult positive in the ED, CT showing constipation with large stool  burden  Resume eliquis  Urinalysis and Urine micro does note large blood,, however after Akins was placed, urine does not appear to be grossly bloody.  Suspect that anemia secondary to gross hematuria would be more clinically apparent, and patient seems like a reasonable enough historian to notice this      Constipation  Assessment & Plan  CT demonstrating constipation with large stool burden  Ordered bowel regimen with MiraLAX, soapsuds enema, and docusate for medical therapy    Cholecystitis  Assessment & Plan  Diagnosed during 5/2024 hospitalization at [facility], she is s/p percutaneous cholecystostomy tube placement  CT abd and pelvis in the ed demonstrating Cholelithiasis with pericholecystic inflammation suggestive of cholecystitis, possibly chronic noting similar findings on prior study. The gallbladder is again relatively decompressed with cholecystostomy tube terminating external to the gallbladder   lumen.  Recently completed course of amoxicillin  Continue routine cholecystostomy care and plan for outpatient surgery followup    Paroxysmal atrial fibrillation with RVR ([facility])  Assessment & Plan  Rate controlled on diltiazem and toprol XL  Resume eliquis, hgb stable    Stage 3 chronic kidney disease (HCC)  Assessment & Plan  Lab Results   Component Value Date    EGFR 93 08/13/2024    EGFR 85 08/12/2024    EGFR 86 08/09/2024    CREATININE 0.40 (L) 08/13/2024    CREATININE 0.52 (L) 08/12/2024    CREATININE 0.60 08/09/2024   Renal function appears baseline, continue to monitor  Repeat BMP in the morning    Benign essential hypertension  Assessment & Plan  Resume home meds diltiazem 240 mg daily, lisinopril 20 mg daily, Toprol- mg p.o. twice daily    * COVID  Assessment & Plan  Patient sent to ED from [facility] noting with lethargy, fever, and vomiting  Reported temperature of 101.2 today  Patient also noted to have new oxygen need  She was brought to the ED where she was found to have  infiltrate on x-ray and COVID-19 positive.  CT chest showing bilateral pleural effusion with some groundglass appearing infiltrates  Patient is comfortable on room air  At present would classify this as mild disease.  Cover with remdesivir to prevent progression  I am unsure the utility of checking D-dimer and inflammatory markers inpatient with active cystitis and chronic cholecystitis.  Will hold off for now                 VTE Pharmacologic Prophylaxis:   Moderate Risk (Score 3-4) - Pharmacological DVT Prophylaxis Ordered: apixaban (Eliquis).    Mobility:   Basic Mobility Inpatient Raw Score: 12  JH-HLM Goal: 4: Move to chair/commode  JH-HLM Achieved: 4: Move to chair/commode  JH-HLM Goal achieved. Continue to encourage appropriate mobility.    Patient Centered Rounds: I performed bedside rounds with nursing staff today.   Discussions with Specialists or Other Care Team Provider:     Education and Discussions with Family / Patient: Updated  (son) via phone.    Total Time Spent on Date of Encounter in care of patient:  mins. This time was spent on one or more of the following: performing physical exam; counseling and coordination of care; obtaining or reviewing history; documenting in the medical record; reviewing/ordering tests, medications or procedures; communicating with other healthcare professionals and discussing with patient's family/caregivers.    Current Length of Stay: 2 day(s)  Current Patient Status: Inpatient   Certification Statement: The patient will continue to require additional inpatient hospital stay due to placement tomorrow  Discharge Plan: Anticipate discharge tomorrow to rehab facility.    Code Status: Level 3 - DNAR and DNI    Subjective:   Patient reports poor appetite but denies any other acute complaints    Objective:     Vitals:   Temp (24hrs), Av.9 °F (36.6 °C), Min:97.5 °F (36.4 °C), Max:98.5 °F (36.9 °C)    Temp:  [97.5 °F (36.4 °C)-98.5 °F (36.9 °C)] 97.5 °F (36.4  °C)  HR:  [60-70] 60  Resp:  [18] 18  BP: (120-147)/(57-74) 147/74  SpO2:  [95 %-99 %] 99 %  Body mass index is 20.12 kg/m².     Input and Output Summary (last 24 hours):     Intake/Output Summary (Last 24 hours) at 8/14/2024 1318  Last data filed at 8/14/2024 0501  Gross per 24 hour   Intake 390 ml   Output 972 ml   Net -582 ml       Physical Exam:   Physical Exam  Vitals and nursing note reviewed.   Constitutional:       General: She is not in acute distress.     Appearance: She is well-developed. She is not toxic-appearing or diaphoretic.      Comments: Frail elderly female   HENT:      Head: Normocephalic and atraumatic.   Eyes:      General: No scleral icterus.     Conjunctiva/sclera: Conjunctivae normal.   Cardiovascular:      Rate and Rhythm: Normal rate and regular rhythm.      Heart sounds: No murmur heard.     No friction rub. No gallop.   Pulmonary:      Effort: Pulmonary effort is normal. No respiratory distress.      Breath sounds: Normal breath sounds. No stridor. No wheezing, rhonchi or rales.   Chest:      Chest wall: No tenderness.   Abdominal:      General: There is no distension.      Palpations: Abdomen is soft. There is no mass.      Tenderness: There is no abdominal tenderness. There is no guarding or rebound.      Hernia: No hernia is present.   Musculoskeletal:         General: No swelling or tenderness.      Cervical back: Neck supple.      Right lower leg: No edema.      Left lower leg: No edema.   Skin:     General: Skin is warm and dry.      Capillary Refill: Capillary refill takes less than 2 seconds.   Neurological:      Mental Status: She is alert and oriented to person, place, and time.   Psychiatric:         Mood and Affect: Mood normal.          Additional Data:     Labs:  Results from last 7 days   Lab Units 08/14/24  0925   WBC Thousand/uL 2.16*   HEMOGLOBIN g/dL 10.0*   HEMATOCRIT % 33.4*   PLATELETS Thousands/uL 137*   SEGS PCT % 62   LYMPHO PCT % 32   MONO PCT % 5   EOS PCT % 0      Results from last 7 days   Lab Units 08/13/24  0521 08/12/24  1405   SODIUM mmol/L 137 134*   POTASSIUM mmol/L 3.8 4.2   CHLORIDE mmol/L 102 101   CO2 mmol/L 27 29   BUN mg/dL 16 19   CREATININE mg/dL 0.40* 0.52*   ANION GAP mmol/L 8 4   CALCIUM mg/dL 7.8* 8.2*   ALBUMIN g/dL  --  2.7*   TOTAL BILIRUBIN mg/dL  --  0.38   ALK PHOS U/L  --  58   ALT U/L  --  20   AST U/L  --  13   GLUCOSE RANDOM mg/dL 69 112     Results from last 7 days   Lab Units 08/13/24  0521   INR  1.37*     Results from last 7 days   Lab Units 08/12/24  1401   POC GLUCOSE mg/dl 110         Results from last 7 days   Lab Units 08/13/24  0521 08/12/24  1405   LACTIC ACID mmol/L  --  1.0   PROCALCITONIN ng/ml 0.21 0.16       Lines/Drains:  Invasive Devices       Peripheral Intravenous Line  Duration             Peripheral IV 08/12/24 Right Antecubital 2 days    Peripheral IV 08/12/24 Left Antecubital 1 day              Drain  Duration             Open Drain RLQ 15 days    Urethral Catheter 16 Fr. 11 days                  Urinary Catheter:  Goal for removal: N/A- Discharging with Akins               Imaging: No pertinent imaging reviewed.    Recent Cultures (last 7 days):   Results from last 7 days   Lab Units 08/12/24  1406 08/12/24  1405   BLOOD CULTURE   --  No Growth at 24 hrs.  No Growth at 24 hrs.   URINE CULTURE  70,000-79,000 cfu/ml Yeast*  --        Last 24 Hours Medication List:   Current Facility-Administered Medications   Medication Dose Route Frequency Provider Last Rate    acetaminophen  650 mg Oral Q6H PRN Nick Lai MD      aluminum-magnesium hydroxide-simethicone  30 mL Oral Q4H PRN Nick Lai MD      apixaban  2.5 mg Oral BID Geoffrey Banai, DO      atorvastatin  40 mg Oral Daily Michael Bowman      calcium carbonate-vitamin D  1 tablet Oral Every Other Day Michael Bowman      cefTRIAXone  1,000 mg Intravenous Q24H Geoffrey Banai, DO 1,000 mg (08/13/24 1137)    dexamethasone  6 mg Intravenous Daily Geoffrey Banai, DO       diltiazem  240 mg Oral Daily Michael Bowman      docusate sodium  100 mg Oral BID Michael Bowman      fluticasone  1 spray Each Nare BID Michael Bowman      levothyroxine  88 mcg Oral Early Morning Michael Bowman      lisinopril  20 mg Oral Daily Michael Bowman      metoprolol succinate  100 mg Oral BID Michael Bowman      mirtazapine  15 mg Oral HS Michael Bowman      ondansetron  4 mg Intravenous Once Marcial Ramirez DO      polyethylene glycol  17 g Oral Daily PRN Michael Bowman      remdesivir  100 mg Intravenous Q24H Michael Bowman Stopped (08/13/24 2030)    sertraline  50 mg Oral Daily Michael Bowman          Today, Patient Was Seen By: Geoffrey Alex DO    **Please Note: This note may have been constructed using a voice recognition system.**

## 2024-08-14 NOTE — PLAN OF CARE
Problem: PAIN - ADULT  Goal: Verbalizes/displays adequate comfort level or baseline comfort level  Description: Interventions:  - Encourage patient to monitor pain and request assistance  - Assess pain using appropriate pain scale  - Administer analgesics based on type and severity of pain and evaluate response  - Implement non-pharmacological measures as appropriate and evaluate response  - Consider cultural and social influences on pain and pain management  - Notify physician/advanced practitioner if interventions unsuccessful or patient reports new pain  Outcome: Progressing     Problem: INFECTION - ADULT  Goal: Absence or prevention of progression during hospitalization  Description: INTERVENTIONS:  - Assess and monitor for signs and symptoms of infection  - Monitor lab/diagnostic results  - Monitor all insertion sites, i.e. indwelling lines, tubes, and drains  - Monitor endotracheal if appropriate and nasal secretions for changes in amount and color  - [place] appropriate cooling/warming therapies per order  - Administer medications as ordered  - Instruct and encourage patient and family to use good hand hygiene technique  - Identify and instruct in appropriate isolation precautions for identified infection/condition  Outcome: Progressing  Goal: Absence of fever/infection during neutropenic period  Description: INTERVENTIONS:  - Monitor WBC    Outcome: Progressing     Problem: SAFETY ADULT  Goal: Patient will remain free of falls  Description: INTERVENTIONS:  - Educate patient/family on patient safety including physical limitations  - Instruct patient to call for assistance with activity   - Consult OT/PT to assist with strengthening/mobility   - Keep Call bell within reach  - Keep bed low and locked with side rails adjusted as appropriate  - Keep care items and personal belongings within reach  - Initiate and maintain comfort rounds  - Make Fall Risk Sign visible to staff  - Offer Toileting every 2 Hours,  in advance of need  - Initiate/Maintain alarm  - Obtain necessary fall risk management equipment:   - Apply yellow socks and bracelet for high fall risk patients  - Consider moving patient to room near nurses station  Outcome: Progressing  Goal: Maintain or return to baseline ADL function  Description: INTERVENTIONS:  -  Assess patient's ability to carry out ADLs; assess patient's baseline for ADL function and identify physical deficits which impact ability to perform ADLs (bathing, care of mouth/teeth, toileting, grooming, dressing, etc.)  - Assess/evaluate cause of self-care deficits   - Assess range of motion  - Assess patient's mobility; develop plan if impaired  - Assess patient's need for assistive devices and provide as appropriate  - Encourage maximum independence but intervene and supervise when necessary  - Involve family in performance of ADLs  - Assess for home care needs following discharge   - Consider OT consult to assist with ADL evaluation and planning for discharge  - Provide patient education as appropriate  Outcome: Progressing  Goal: Maintains/Returns to pre admission functional level  Description: INTERVENTIONS:  - Perform AM-PAC 6 Click Basic Mobility/ Daily Activity assessment daily.  - Set and communicate daily mobility goal to care team and patient/family/caregiver.   - Collaborate with rehabilitation services on mobility goals if consulted  - Perform Range of Motion 3 times a day.  - Reposition patient every 2 hours.  - Dangle patient 3 times a day  - Stand patient 3 times a day  - Ambulate patient 3 times a day  - Out of bed to chair 3 times a day   - Out of bed for meals 3 times a day  - Out of bed for toileting  - Record patient progress and toleration of activity level   Outcome: Progressing     Problem: DISCHARGE PLANNING  Goal: Discharge to home or other facility with appropriate resources  Description: INTERVENTIONS:  - Identify barriers to discharge w/patient and caregiver  -  Arrange for needed discharge resources and transportation as appropriate  - Identify discharge learning needs (meds, wound care, etc.)  - Arrange for interpretive services to assist at discharge as needed  - Refer to Case Management Department for coordinating discharge planning if the patient needs post-hospital services based on physician/advanced practitioner order or complex needs related to functional status, cognitive ability, or social support system  Outcome: Progressing     Problem: Knowledge Deficit  Goal: Patient/family/caregiver demonstrates understanding of disease process, treatment plan, medications, and discharge instructions  Description: Complete learning assessment and assess knowledge base.  Interventions:  - Provide teaching at level of understanding  - Provide teaching via preferred learning methods  Outcome: Progressing     Problem: Prexisting or High Potential for Compromised Skin Integrity  Goal: Skin integrity is maintained or improved  Description: INTERVENTIONS:  - Identify patients at risk for skin breakdown  - Assess and monitor skin integrity  - Assess and monitor nutrition and hydration status  - Monitor labs   - Assess for incontinence   - Turn and reposition patient  - Assist with mobility/ambulation  - Relieve pressure over bony prominences  - Avoid friction and shearing  - Provide appropriate hygiene as needed including keeping skin clean and dry  - Evaluate need for skin moisturizer/barrier cream  - Collaborate with interdisciplinary team   - Patient/family teaching  - Consider wound care consult   Outcome: Progressing

## 2024-08-14 NOTE — PLAN OF CARE
Problem: PAIN - ADULT  Goal: Verbalizes/displays adequate comfort level or baseline comfort level  Description: Interventions:  - Encourage patient to monitor pain and request assistance  - Assess pain using appropriate pain scale  - Administer analgesics based on type and severity of pain and evaluate response  - Implement non-pharmacological measures as appropriate and evaluate response  - Consider cultural and social influences on pain and pain management  - Notify physician/advanced practitioner if interventions unsuccessful or patient reports new pain  Outcome: Progressing     Problem: INFECTION - ADULT  Goal: Absence or prevention of progression during hospitalization  Description: INTERVENTIONS:  - Assess and monitor for signs and symptoms of infection  - Monitor lab/diagnostic results  - Monitor all insertion sites, i.e. indwelling lines, tubes, and drains  - Monitor endotracheal if appropriate and nasal secretions for changes in amount and color  - [place] appropriate cooling/warming therapies per order  - Administer medications as ordered  - Instruct and encourage patient and family to use good hand hygiene technique  - Identify and instruct in appropriate isolation precautions for identified infection/condition  Outcome: Progressing  Goal: Absence of fever/infection during neutropenic period  Description: INTERVENTIONS:  - Monitor WBC    Outcome: Progressing

## 2024-08-15 PROBLEM — J18.9 PNEUMONIA: Status: ACTIVE | Noted: 2024-08-15

## 2024-08-15 LAB
ANION GAP SERPL CALCULATED.3IONS-SCNC: 7 MMOL/L (ref 4–13)
BACTERIA UR CULT: ABNORMAL
BASOPHILS # BLD AUTO: 0 THOUSANDS/ÂΜL (ref 0–0.1)
BASOPHILS NFR BLD AUTO: 0 % (ref 0–1)
BUN SERPL-MCNC: 25 MG/DL (ref 5–25)
CALCIUM SERPL-MCNC: 7.9 MG/DL (ref 8.4–10.2)
CHLORIDE SERPL-SCNC: 101 MMOL/L (ref 96–108)
CO2 SERPL-SCNC: 27 MMOL/L (ref 21–32)
CREAT SERPL-MCNC: 0.42 MG/DL (ref 0.6–1.3)
EOSINOPHIL # BLD AUTO: 0 THOUSAND/ÂΜL (ref 0–0.61)
EOSINOPHIL NFR BLD AUTO: 0 % (ref 0–6)
ERYTHROCYTE [DISTWIDTH] IN BLOOD BY AUTOMATED COUNT: 15.3 % (ref 11.6–15.1)
GFR SERPL CREATININE-BSD FRML MDRD: 91 ML/MIN/1.73SQ M
GLUCOSE SERPL-MCNC: 120 MG/DL (ref 65–140)
HCT VFR BLD AUTO: 30.4 % (ref 34.8–46.1)
HGB BLD-MCNC: 9.8 G/DL (ref 11.5–15.4)
IMM GRANULOCYTES # BLD AUTO: 0.02 THOUSAND/UL (ref 0–0.2)
IMM GRANULOCYTES NFR BLD AUTO: 0 % (ref 0–2)
LYMPHOCYTES # BLD AUTO: 1.37 THOUSANDS/ÂΜL (ref 0.6–4.47)
LYMPHOCYTES NFR BLD AUTO: 23 % (ref 14–44)
MCH RBC QN AUTO: 30.8 PG (ref 26.8–34.3)
MCHC RBC AUTO-ENTMCNC: 32.2 G/DL (ref 31.4–37.4)
MCV RBC AUTO: 96 FL (ref 82–98)
MONOCYTES # BLD AUTO: 0.33 THOUSAND/ÂΜL (ref 0.17–1.22)
MONOCYTES NFR BLD AUTO: 5 % (ref 4–12)
NEUTROPHILS # BLD AUTO: 4.35 THOUSANDS/ÂΜL (ref 1.85–7.62)
NEUTS SEG NFR BLD AUTO: 72 % (ref 43–75)
NRBC BLD AUTO-RTO: 0 /100 WBCS
PLATELET # BLD AUTO: 186 THOUSANDS/UL (ref 149–390)
PMV BLD AUTO: 11 FL (ref 8.9–12.7)
POTASSIUM SERPL-SCNC: 4.1 MMOL/L (ref 3.5–5.3)
RBC # BLD AUTO: 3.18 MILLION/UL (ref 3.81–5.12)
SODIUM SERPL-SCNC: 135 MMOL/L (ref 135–147)
WBC # BLD AUTO: 6.07 THOUSAND/UL (ref 4.31–10.16)

## 2024-08-15 PROCEDURE — 80048 BASIC METABOLIC PNL TOTAL CA: CPT | Performed by: INTERNAL MEDICINE

## 2024-08-15 PROCEDURE — 99232 SBSQ HOSP IP/OBS MODERATE 35: CPT | Performed by: INTERNAL MEDICINE

## 2024-08-15 PROCEDURE — 85025 COMPLETE CBC W/AUTO DIFF WBC: CPT | Performed by: INTERNAL MEDICINE

## 2024-08-15 RX ORDER — DEXAMETHASONE 6 MG/1
6 TABLET ORAL
Qty: 3 TABLET | Refills: 0 | Status: SHIPPED | OUTPATIENT
Start: 2024-08-15 | End: 2024-08-18

## 2024-08-15 RX ORDER — DOCUSATE SODIUM 100 MG/1
100 CAPSULE, LIQUID FILLED ORAL 2 TIMES DAILY
Qty: 30 CAPSULE | Refills: 0 | Status: SHIPPED | OUTPATIENT
Start: 2024-08-15

## 2024-08-15 RX ADMIN — METOPROLOL SUCCINATE 100 MG: 100 TABLET, EXTENDED RELEASE ORAL at 10:20

## 2024-08-15 RX ADMIN — FLUTICASONE PROPIONATE 1 SPRAY: 50 SPRAY, METERED NASAL at 17:35

## 2024-08-15 RX ADMIN — LISINOPRIL 20 MG: 20 TABLET ORAL at 10:19

## 2024-08-15 RX ADMIN — APIXABAN 2.5 MG: 2.5 TABLET, FILM COATED ORAL at 17:34

## 2024-08-15 RX ADMIN — REMDESIVIR 100 MG: 100 INJECTION, POWDER, LYOPHILIZED, FOR SOLUTION INTRAVENOUS at 18:48

## 2024-08-15 RX ADMIN — ATORVASTATIN CALCIUM 40 MG: 40 TABLET, FILM COATED ORAL at 10:20

## 2024-08-15 RX ADMIN — LEVOTHYROXINE SODIUM 88 MCG: 88 TABLET ORAL at 05:13

## 2024-08-15 RX ADMIN — MIRTAZAPINE 15 MG: 15 TABLET, FILM COATED ORAL at 20:56

## 2024-08-15 RX ADMIN — DILTIAZEM HYDROCHLORIDE 240 MG: 240 CAPSULE, EXTENDED RELEASE ORAL at 10:19

## 2024-08-15 RX ADMIN — DOCUSATE SODIUM 100 MG: 100 CAPSULE, LIQUID FILLED ORAL at 17:34

## 2024-08-15 RX ADMIN — APIXABAN 2.5 MG: 2.5 TABLET, FILM COATED ORAL at 10:19

## 2024-08-15 RX ADMIN — METOPROLOL SUCCINATE 100 MG: 100 TABLET, EXTENDED RELEASE ORAL at 20:56

## 2024-08-15 RX ADMIN — DEXAMETHASONE SODIUM PHOSPHATE 6 MG: 10 INJECTION INTRAMUSCULAR; INTRAVENOUS at 10:20

## 2024-08-15 RX ADMIN — SERTRALINE HYDROCHLORIDE 50 MG: 50 TABLET ORAL at 10:20

## 2024-08-15 NOTE — PLAN OF CARE
Problem: INFECTION - ADULT  Goal: Absence or prevention of progression during hospitalization  Description: INTERVENTIONS:  - Assess and monitor for signs and symptoms of infection  - Monitor lab/diagnostic results  - Monitor all insertion sites, i.e. indwelling lines, tubes, and drains  - Monitor endotracheal if appropriate and nasal secretions for changes in amount and color  - [place] appropriate cooling/warming therapies per order  - Administer medications as ordered  - Instruct and encourage patient and family to use good hand hygiene technique  - Identify and instruct in appropriate isolation precautions for identified infection/condition  Outcome: Progressing     Problem: INFECTION - ADULT  Goal: Absence of fever/infection during neutropenic period  Description: INTERVENTIONS:  - Monitor WBC    Outcome: Progressing     Problem: DISCHARGE PLANNING  Goal: Discharge to home or other facility with appropriate resources  Description: INTERVENTIONS:  - Identify barriers to discharge w/patient and caregiver  - Arrange for needed discharge resources and transportation as appropriate  - Identify discharge learning needs (meds, wound care, etc.)  - Arrange for interpretive services to assist at discharge as needed  - Refer to Case Management Department for coordinating discharge planning if the patient needs post-hospital services based on physician/advanced practitioner order or complex needs related to functional status, cognitive ability, or social support system  Outcome: Progressing     Problem: Knowledge Deficit  Goal: Patient/family/caregiver demonstrates understanding of disease process, treatment plan, medications, and discharge instructions  Description: Complete learning assessment and assess knowledge base.  Interventions:  - Provide teaching at level of understanding  - Provide teaching via preferred learning methods  Outcome: Progressing

## 2024-08-15 NOTE — ASSESSMENT & PLAN NOTE
Platelet 117, appears lower than previously recorded plt counts from last hospitalization which was in the range of 200s-230s   CBC 3 days before admission in [facility] network noted plt count 121  Patient was recently hospitalized at our facility from 7/29-8 2, however it does not appear as though she received heparin products because she is on apixaban  Suspect this is consumptive in the setting of of COVID and possibly cystitis.  Low concern for TMA/DIC, however will continue to monitor with repeat coags and CBC in the morning

## 2024-08-15 NOTE — ASSESSMENT & PLAN NOTE
[facility] she is s/p percutaneous cholecystostomy tube placement  CT abd and pelvis in the ed demonstrating Cholelithiasis with pericholecystic inflammation suggestive of cholecystitis, possibly chronic noting similar findings on prior study. The gallbladder is again relatively decompressed with cholecystostomy tube terminating external to the gallbladder   lumen.  Recently completed course of amoxicillin  Continue routine cholecystostomy care and plan for outpatient surgery followup

## 2024-08-15 NOTE — ASSESSMENT & PLAN NOTE
Patient noted to have hemoglobin 8.7 on admission, this is noted to be reduced from recent blood work from around 2 days ago with hemoglobins ranging from 10-12.  Care everywhere has a CBC ordered from [facility] showing hgb 8.6 on 8/9  Fortunately likely less concerning for acute bleed  Patient noted to be Hemoccult positive in the ED, CT showing constipation with large stool burden  Resume eliquis  Urinalysis and Urine micro does note large blood,, however after Akins was placed, urine does not appear to be grossly bloody.  Suspect that anemia secondary to gross hematuria would be more clinically apparent, and patient seems like a reasonable enough historian to notice this

## 2024-08-15 NOTE — ASSESSMENT & PLAN NOTE
Lab Results   Component Value Date    EGFR 91 08/15/2024    EGFR 93 08/13/2024    EGFR 85 08/12/2024    CREATININE 0.42 (L) 08/15/2024    CREATININE 0.40 (L) 08/13/2024    CREATININE 0.52 (L) 08/12/2024   Renal function appears baseline, continue to monitor  Repeat BMP in the morning

## 2024-08-15 NOTE — ASSESSMENT & PLAN NOTE
[place] secondary to covid with cxr showing ground glass opacities, continue ceftriaxone and steroids

## 2024-08-15 NOTE — ASSESSMENT & PLAN NOTE
Patient sent to ED from [facility] noting with lethargy, fever, and vomiting  Reported temperature of 101.2 today  Patient also noted to have new oxygen need  She was brought to the ED where she was found to have infiltrate on x-ray and COVID-19 positive.  CT chest showing bilateral pleural effusion with some groundglass appearing infiltrates  Patient is comfortable on room air  Continue ceftriaxone  At present would classify this as mild disease.  Cover with remdesivir to prevent progression  I am unsure the utility of checking D-dimer and inflammatory markers inpatient with active cystitis and chronic cholecystitis.  Will hold off for now

## 2024-08-15 NOTE — PROGRESS NOTES
[facility]  Progress Note  Name: Jennifer Patel I  MRN: [id number]  Unit/Bed#: [facility] 633-01 I Date of Admission: 8/12/2024   Date of Service: 8/15/2024 I Hospital Day: 3    Assessment & Plan   Pneumonia  Assessment & Plan  [place] secondary to covid with cxr showing ground glass opacities, continue ceftriaxone and steroids    Elevated troponin  Assessment & Plan  Troponin in the ED elevated to 109->74  Patient without chest pain and EKG does not appear to show any acute ischemic appearing changes  Suspect this is some demand ischemia in the setting of COVID infection  Monitor on telemetry for 24 hours  Resume eliquis    Cystitis  Assessment & Plan  Urinalysis was concern for possible infection and CT showing thickened bladder wall concerning for cystitis  Akins placed in ED  Cover with Rocephin and monitor blood and urine cultures    Nausea and vomiting  Assessment & Plan  Likely secondary to COVID-19 infection   Zofran as needed, gentle hydration will be ordered  Cholecystostomy tube appears to be functioning properly, no abdominal tenderness    Thrombocytopenia (HCC)  Assessment & Plan  Platelet 117, appears lower than previously recorded plt counts from last hospitalization which was in the range of 200s-230s   CBC 3 days before admission in [facility] network noted plt count 121  Patient was recently hospitalized at our facility from 7/29-8 2, however it does not appear as though she received heparin products because she is on apixaban  Suspect this is consumptive in the setting of of COVID and possibly cystitis.  Low concern for TMA/DIC, however will continue to monitor with repeat coags and CBC in the morning    Anemia  Assessment & Plan  Patient noted to have hemoglobin 8.7 on admission, this is noted to be reduced from recent blood work from around 2 days ago with hemoglobins ranging from 10-12.  Care everywhere has a CBC ordered from [facility] showing hgb 8.6 on  8/9  Fortunately likely less concerning for acute bleed  Patient noted to be Hemoccult positive in the ED, CT showing constipation with large stool burden  Resume eliquis  Urinalysis and Urine micro does note large blood,, however after Akins was placed, urine does not appear to be grossly bloody.  Suspect that anemia secondary to gross hematuria would be more clinically apparent, and patient seems like a reasonable enough historian to notice this      Constipation  Assessment & Plan  CT demonstrating constipation with large stool burden  Ordered bowel regimen with MiraLAX, soapsuds enema, and docusate for medical therapy    Cholecystitis  Assessment & Plan  Diagnosed during 5/2024 hospitalization at [facility], she is s/p percutaneous cholecystostomy tube placement  CT abd and pelvis in the ed demonstrating Cholelithiasis with pericholecystic inflammation suggestive of cholecystitis, possibly chronic noting similar findings on prior study. The gallbladder is again relatively decompressed with cholecystostomy tube terminating external to the gallbladder   lumen.  Recently completed course of amoxicillin  Continue routine cholecystostomy care and plan for outpatient surgery followup    Paroxysmal atrial fibrillation with RVR ([facility])  Assessment & Plan  Rate controlled on diltiazem and toprol XL  Resume eliquis, hgb stable    Stage 3 chronic kidney disease (HCC)  Assessment & Plan  Lab Results   Component Value Date    EGFR 91 08/15/2024    EGFR 93 08/13/2024    EGFR 85 08/12/2024    CREATININE 0.42 (L) 08/15/2024    CREATININE 0.40 (L) 08/13/2024    CREATININE 0.52 (L) 08/12/2024   Renal function appears baseline, continue to monitor  Repeat BMP in the morning    Benign essential hypertension  Assessment & Plan  Resume home meds diltiazem 240 mg daily, lisinopril 20 mg daily, Toprol- mg p.o. twice daily    * COVID  Assessment & Plan  Patient sent to ED from [facility] noting with lethargy, fever, and  vomiting  Reported temperature of 101.2 today  Patient also noted to have new oxygen need  She was brought to the ED where she was found to have infiltrate on x-ray and COVID-19 positive.  CT chest showing bilateral pleural effusion with some groundglass appearing infiltrates  Patient is comfortable on room air  Continue ceftriaxone  At present would classify this as mild disease.  Cover with remdesivir to prevent progression  I am unsure the utility of checking D-dimer and inflammatory markers inpatient with active cystitis and chronic cholecystitis.  Will hold off for now                 VTE Pharmacologic Prophylaxis:   Moderate Risk (Score 3-4) - Pharmacological DVT Prophylaxis Ordered: apixaban (Eliquis).    Mobility:   Basic Mobility Inpatient Raw Score: 12  JH-HLM Goal: 4: Move to chair/commode  JH-HLM Achieved: 4: Move to chair/commode  JH-HLM Goal achieved. Continue to encourage appropriate mobility.    Patient Centered Rounds: I performed bedside rounds with nursing staff today.   Discussions with Specialists or Other Care Team Provider:     Education and Discussions with Family / Patient: Updated  (son) via phone.    Total Time Spent on Date of Encounter in care of patient:  mins. This time was spent on one or more of the following: performing physical exam; counseling and coordination of care; obtaining or reviewing history; documenting in the medical record; reviewing/ordering tests, medications or procedures; communicating with other healthcare professionals and discussing with patient's family/caregivers.    Current Length of Stay: 3 day(s)  Current Patient Status: Inpatient   Certification Statement: The patient will continue to require additional inpatient hospital stay due to abx  Discharge Plan: Anticipate discharge in 24-48 hrs to snf    Code Status: Level 3 - DNAR and DNI    Subjective:   Patient denies any acute complaints today apart from ongoing poor appetite    Objective:      Vitals:   Temp (24hrs), Av.4 °F (36.3 °C), Min:97.3 °F (36.3 °C), Max:97.5 °F (36.4 °C)    Temp:  [97.3 °F (36.3 °C)-97.5 °F (36.4 °C)] 97.3 °F (36.3 °C)  HR:  [55-64] 64  Resp:  [16-18] 18  BP: (125-157)/(59-80) 157/80  SpO2:  [91 %-95 %] 95 %  Body mass index is 20.12 kg/m².     Input and Output Summary (last 24 hours):     Intake/Output Summary (Last 24 hours) at 8/15/2024 1256  Last data filed at 8/15/2024 1100  Gross per 24 hour   Intake --   Output 730 ml   Net -730 ml       Physical Exam:   Physical Exam  Constitutional:       General: She is not in acute distress.     Appearance: She is not toxic-appearing or diaphoretic.      Comments: Frail elderly female   Eyes:      General: No scleral icterus.  Cardiovascular:      Rate and Rhythm: Normal rate and regular rhythm.      Heart sounds: No murmur heard.     No friction rub. No gallop.   Pulmonary:      Effort: No respiratory distress.      Breath sounds: No stridor. No wheezing, rhonchi or rales.      Comments: Decreased breath sounds bilaterally  Chest:      Chest wall: No tenderness.   Abdominal:      General: There is no distension.      Palpations: There is no mass.      Tenderness: There is no abdominal tenderness. There is no guarding or rebound.      Hernia: No hernia is present.   Musculoskeletal:         General: No swelling.   Skin:     Coloration: Skin is pale.          Additional Data:     Labs:  Results from last 7 days   Lab Units 08/15/24  0517   WBC Thousand/uL 6.07   HEMOGLOBIN g/dL 9.8*   HEMATOCRIT % 30.4*   PLATELETS Thousands/uL 186   SEGS PCT % 72   LYMPHO PCT % 23   MONO PCT % 5   EOS PCT % 0     Results from last 7 days   Lab Units 08/15/24  0517 24  0521 24  1405   SODIUM mmol/L 135   < > 134*   POTASSIUM mmol/L 4.1   < > 4.2   CHLORIDE mmol/L 101   < > 101   CO2 mmol/L 27   < > 29   BUN mg/dL 25   < > 19   CREATININE mg/dL 0.42*   < > 0.52*   ANION GAP mmol/L 7   < > 4   CALCIUM mg/dL 7.9*   < > 8.2*   ALBUMIN  g/dL  --   --  2.7*   TOTAL BILIRUBIN mg/dL  --   --  0.38   ALK PHOS U/L  --   --  58   ALT U/L  --   --  20   AST U/L  --   --  13   GLUCOSE RANDOM mg/dL 120   < > 112    < > = values in this interval not displayed.     Results from last 7 days   Lab Units 08/13/24  0521   INR  1.37*     Results from last 7 days   Lab Units 08/12/24  1401   POC GLUCOSE mg/dl 110         Results from last 7 days   Lab Units 08/13/24  0521 08/12/24  1405   LACTIC ACID mmol/L  --  1.0   PROCALCITONIN ng/ml 0.21 0.16       Lines/Drains:  Invasive Devices       Peripheral Intravenous Line  Duration             Peripheral IV 08/12/24 Left Antecubital 2 days    Peripheral IV 08/12/24 Right Antecubital 2 days              Drain  Duration             Open Drain RLQ 16 days    Urethral Catheter 16 Fr. <1 day                  Urinary Catheter:  Goal for removal: N/A- Discharging with Akins               Imaging: No pertinent imaging reviewed.    Recent Cultures (last 7 days):   Results from last 7 days   Lab Units 08/12/24  1406 08/12/24  1405   BLOOD CULTURE   --  No Growth at 48 hrs.  No Growth at 48 hrs.   URINE CULTURE  >100,000 cfu/ml Candida lusitaniae*  --        Last 24 Hours Medication List:   Current Facility-Administered Medications   Medication Dose Route Frequency Provider Last Rate    acetaminophen  650 mg Oral Q6H PRN Nick Lai MD      aluminum-magnesium hydroxide-simethicone  30 mL Oral Q4H PRN Nick Lai MD      apixaban  2.5 mg Oral BID Geoffrey Alex, DO      atorvastatin  40 mg Oral Daily Michael Bowman      calcium carbonate-vitamin D  1 tablet Oral Every Other Day Michael Bowman      dexamethasone  6 mg Intravenous Daily Geoffrey Alex, DO      diltiazem  240 mg Oral Daily Michael Bowman      docusate sodium  100 mg Oral BID Michael Bowman      fluticasone  1 spray Each Nare BID Michael Bowman      levothyroxine  88 mcg Oral Early Morning Michael Bowman      lisinopril  20 mg Oral Daily Michael  Ignacio Bowman      metoprolol succinate  100 mg Oral BID Michael Bowman      mirtazapine  15 mg Oral HS Michael Bowman      ondansetron  4 mg Intravenous Once Marcial Ramirez DO      polyethylene glycol  17 g Oral Daily PRN Michael Bowman      remdesivir  100 mg Intravenous Q24H Michael Bowman 0 mg (08/13/24 2030)    sertraline  50 mg Oral Daily Michael Bowman          Today, Patient Was Seen By: Geoffrey Alex DO    **Please Note: This note may have been constructed using a voice recognition system.**

## 2024-08-15 NOTE — PLAN OF CARE
Problem: PAIN - ADULT  Goal: Verbalizes/displays adequate comfort level or baseline comfort level  Description: Interventions:  - Encourage patient to monitor pain and request assistance  - Assess pain using appropriate pain scale  - Administer analgesics based on type and severity of pain and evaluate response  - Implement non-pharmacological measures as appropriate and evaluate response  - Consider cultural and social influences on pain and pain management  - Notify physician/advanced practitioner if interventions unsuccessful or patient reports new pain  Outcome: Progressing     Problem: INFECTION - ADULT  Goal: Absence or prevention of progression during hospitalization  Description: INTERVENTIONS:  - Assess and monitor for signs and symptoms of infection  - Monitor lab/diagnostic results  - Monitor all insertion sites, i.e. indwelling lines, tubes, and drains  - Monitor endotracheal if appropriate and nasal secretions for changes in amount and color  - [place] appropriate cooling/warming therapies per order  - Administer medications as ordered  - Instruct and encourage patient and family to use good hand hygiene technique  - Identify and instruct in appropriate isolation precautions for identified infection/condition  Outcome: Progressing  Goal: Absence of fever/infection during neutropenic period  Description: INTERVENTIONS:  - Monitor WBC    Outcome: Progressing

## 2024-08-15 NOTE — CASE MANAGEMENT
Case Management Discharge Planning Note    Patient name Jennifer Patel  Location [facility] 633/[facility] 633-01 MRN [id number]  : 1935 Date 8/15/2024       Current Admission Date: 2024  Current Admission Diagnosis:COVID   Patient Active Problem List    Diagnosis Date Noted Date Diagnosed    COVID 2024     Constipation 2024     Anemia 2024     Thrombocytopenia (HCC) 2024     Nausea and vomiting 2024     Cystitis 2024     Elevated troponin 2024     Electrolyte abnormality 2024     Abnormal urinalysis 2024     Severe protein-calorie malnutrition (HCC) 2024     Unsteadiness on feet 2024     Stage 3 chronic kidney disease (HCC) 2024     Failure to thrive in adult 2024     Cholecystitis 2024     Paroxysmal atrial fibrillation with RVR (HCC) 2023     Coronary artery disease involving native coronary artery of native heart without angina pectoris 10/19/2018     Depressive disorder 2008     Benign essential hypertension 2007       LOS (days): 3  Geometric Mean LOS (GMLOS) (days): 3.3  Days to GMLOS:0.6     OBJECTIVE:  Risk of Unplanned Readmission Score: 21.44   Current admission status: Inpatient   Preferred Pharmacy:   [facility] [place] - [address]  [address]  [facility] [address]  Phone: [phone] Fax: [phone]    Primary Care Provider: Curtis Jackson MD    Primary Insurance: MEDICARE  Secondary Insurance: [employer] [employer]    DISCHARGE DETAILS:      Other Referral/Resources/Interventions Provided:  Referral Comments: [facility] when pt is cleared for d/c. Pt son in agreement with dcp.      Treatment Team Recommendation: SNF  Discharge Destination Plan:: SNF

## 2024-08-15 NOTE — CASE MANAGEMENT
Case Management Discharge Planning Note    Patient name Jennifer Patel  Location [facility] 633/[facility] 633-01 MRN [id number]  : 1935 Date 8/15/2024       Current Admission Date: 2024  Current Admission Diagnosis:COVID   Patient Active Problem List    Diagnosis Date Noted Date Diagnosed    COVID 2024     Constipation 2024     Anemia 2024     Thrombocytopenia (HCC) 2024     Nausea and vomiting 2024     Cystitis 2024     Elevated troponin 2024     Electrolyte abnormality 2024     Abnormal urinalysis 2024     Severe protein-calorie malnutrition (HCC) 2024     Unsteadiness on feet 2024     Stage 3 chronic kidney disease (HCC) 2024     Failure to thrive in adult 2024     Cholecystitis 2024     Paroxysmal atrial fibrillation with RVR (HCC) 2023     Coronary artery disease involving native coronary artery of native heart without angina pectoris 10/19/2018     Depressive disorder 2008     Benign essential hypertension 2007       LOS (days): 3  Geometric Mean LOS (GMLOS) (days): 3.3  Days to GMLOS:0.5     OBJECTIVE:  Risk of Unplanned Readmission Score: 21.49         Current admission status: Inpatient   Preferred Pharmacy:   [facility] [place] [address]  [address] [facility] [address]  Phone: [phone] Fax: [phone]    Primary Care Provider: Curtis Jackson MD    Primary Insurance: MEDICARE  Secondary Insurance: [place] [employer]    DISCHARGE DETAILS:    Discharge planning discussed with:: Juan Manuel (son)  Freedom of Choice: Yes  Comments - Freedom of Choice: Discussed FOC  CM contacted family/caregiver?: Yes  Were Treatment Team discharge recommendations reviewed with patient/caregiver?: Yes  Did patient/caregiver verbalize understanding of patient care needs?: N/A- going to facility  Were patient/caregiver advised of the risks associated with not following Treatment  Team discharge recommendations?: Yes    Other Referral/Resources/Interventions Provided:  Interventions: SNF  Referral Comments: [facility].    Treatment Team Recommendation: SNF  Discharge Destination Plan:: SNF  Transport at Discharge : Wheelchair van  Dispatcher Contacted: Yes  Number/Name of Dispatcher: [facility]  Transported by (Company and Unit #): [employer]  ETA of Transport (Date): 08/15/24  ETA of Transport (Time): 1400    IMM Given (Date):: 08/15/24  IMM Given to:: Family  Family notified:: Juan Manuel (son )  IMM reviewed with Patient/Family who express understanding and agreement with discharge plan     Accepting Facility Name, City & State : [facility]  Receiving Facility/Agency Phone Number: [phone]  Facility/Agency Fax Number: [phone]

## 2024-08-16 PROCEDURE — 99232 SBSQ HOSP IP/OBS MODERATE 35: CPT | Performed by: INTERNAL MEDICINE

## 2024-08-16 RX ADMIN — DOCUSATE SODIUM 100 MG: 100 CAPSULE, LIQUID FILLED ORAL at 11:31

## 2024-08-16 RX ADMIN — METOPROLOL SUCCINATE 100 MG: 100 TABLET, EXTENDED RELEASE ORAL at 20:26

## 2024-08-16 RX ADMIN — LEVOTHYROXINE SODIUM 88 MCG: 88 TABLET ORAL at 05:35

## 2024-08-16 RX ADMIN — SERTRALINE HYDROCHLORIDE 50 MG: 50 TABLET ORAL at 11:31

## 2024-08-16 RX ADMIN — MIRTAZAPINE 15 MG: 15 TABLET, FILM COATED ORAL at 22:20

## 2024-08-16 RX ADMIN — DOCUSATE SODIUM 100 MG: 100 CAPSULE, LIQUID FILLED ORAL at 18:26

## 2024-08-16 RX ADMIN — APIXABAN 2.5 MG: 2.5 TABLET, FILM COATED ORAL at 18:26

## 2024-08-16 RX ADMIN — METOPROLOL SUCCINATE 100 MG: 100 TABLET, EXTENDED RELEASE ORAL at 11:31

## 2024-08-16 RX ADMIN — LISINOPRIL 20 MG: 20 TABLET ORAL at 11:31

## 2024-08-16 RX ADMIN — ATORVASTATIN CALCIUM 40 MG: 40 TABLET, FILM COATED ORAL at 11:31

## 2024-08-16 RX ADMIN — Medication 1 TABLET: at 11:31

## 2024-08-16 RX ADMIN — DILTIAZEM HYDROCHLORIDE 240 MG: 240 CAPSULE, EXTENDED RELEASE ORAL at 11:31

## 2024-08-16 RX ADMIN — REMDESIVIR 100 MG: 100 INJECTION, POWDER, LYOPHILIZED, FOR SOLUTION INTRAVENOUS at 20:26

## 2024-08-16 RX ADMIN — APIXABAN 2.5 MG: 2.5 TABLET, FILM COATED ORAL at 11:31

## 2024-08-16 RX ADMIN — DEXAMETHASONE SODIUM PHOSPHATE 6 MG: 10 INJECTION INTRAMUSCULAR; INTRAVENOUS at 11:31

## 2024-08-16 NOTE — ASSESSMENT & PLAN NOTE
[facility] she is s/p percutaneous cholecystostomy tube placement  CT abd and pelvis in the ed demonstrating Cholelithiasis with pericholecystic inflammation suggestive of cholecystitis, possibly chronic noting similar findings on prior study. The gallbladder is again relatively decompressed with cholecystostomy tube terminating external to the gallbladder   lumen.  Recently completed course of amoxicillin  Continue routine cholecystostomy care and plan for outpatient surgery followup

## 2024-08-16 NOTE — PROGRESS NOTES
[facility]  Progress Note  Name: Jennifer Patel I  MRN: [id number]  Unit/Bed#: [facility] 633-01 I Date of Admission: 8/12/2024   Date of Service: 8/16/2024 I Hospital Day: 4    Assessment & Plan   Pneumonia  Assessment & Plan  [place] secondary to covid with cxr showing ground glass opacities, continue ceftriaxone and steroids    Elevated troponin  Assessment & Plan  Troponin in the ED elevated to 109->74  Patient without chest pain and EKG does not appear to show any acute ischemic appearing changes  Suspect this is some demand ischemia in the setting of COVID infection  Monitor on telemetry for 24 hours  Resume eliquis    Cystitis  Assessment & Plan  Urinalysis was concern for possible infection and CT showing thickened bladder wall concerning for cystitis  Akins placed in ED  Cover with Rocephin and monitor blood and urine cultures    Nausea and vomiting  Assessment & Plan  Likely secondary to COVID-19 infection   Zofran as needed, gentle hydration will be ordered  Cholecystostomy tube appears to be functioning properly, no abdominal tenderness    Thrombocytopenia (HCC)  Assessment & Plan  Platelet 117, appears lower than previously recorded plt counts from last hospitalization which was in the range of 200s-230s   CBC 3 days before admission in [facility] network noted plt count 121  Patient was recently hospitalized at our facility from 7/29-8 2, however it does not appear as though she received heparin products because she is on apixaban  Suspect this is consumptive in the setting of of COVID and possibly cystitis.  Low concern for TMA/DIC, however will continue to monitor with repeat coags and CBC in the morning    Anemia  Assessment & Plan  Patient noted to have hemoglobin 8.7 on admission, this is noted to be reduced from recent blood work from around 2 days ago with hemoglobins ranging from 10-12.  Care everywhere has a CBC ordered from [facility] showing hgb 8.6 on  8/9  Fortunately likely less concerning for acute bleed  Patient noted to be Hemoccult positive in the ED, CT showing constipation with large stool burden  Resume eliquis  Urinalysis and Urine micro does note large blood,, however after Akins was placed, urine does not appear to be grossly bloody.  Suspect that anemia secondary to gross hematuria would be more clinically apparent, and patient seems like a reasonable enough historian to notice this      Constipation  Assessment & Plan  CT demonstrating constipation with large stool burden  Ordered bowel regimen with MiraLAX, soapsuds enema, and docusate for medical therapy    Cholecystitis  Assessment & Plan  Diagnosed during 5/2024 hospitalization at [facility], she is s/p percutaneous cholecystostomy tube placement  CT abd and pelvis in the ed demonstrating Cholelithiasis with pericholecystic inflammation suggestive of cholecystitis, possibly chronic noting similar findings on prior study. The gallbladder is again relatively decompressed with cholecystostomy tube terminating external to the gallbladder   lumen.  Recently completed course of amoxicillin  Continue routine cholecystostomy care and plan for outpatient surgery followup    Paroxysmal atrial fibrillation with RVR ([facility])  Assessment & Plan  Rate controlled on diltiazem and toprol XL  Resume eliquis, hgb stable    Stage 3 chronic kidney disease (HCC)  Assessment & Plan  Lab Results   Component Value Date    EGFR 91 08/15/2024    EGFR 93 08/13/2024    EGFR 85 08/12/2024    CREATININE 0.42 (L) 08/15/2024    CREATININE 0.40 (L) 08/13/2024    CREATININE 0.52 (L) 08/12/2024   Renal function appears baseline, continue to monitor  Repeat BMP in the morning    Benign essential hypertension  Assessment & Plan  Resume home meds diltiazem 240 mg daily, lisinopril 20 mg daily, Toprol- mg p.o. twice daily    * COVID  Assessment & Plan  Patient sent to ED from [facility] noting with lethargy, fever, and  vomiting  Reported temperature of 101.2 today  Patient also noted to have new oxygen need  She was brought to the ED where she was found to have infiltrate on x-ray and COVID-19 positive.  CT chest showing bilateral pleural effusion with some groundglass appearing infiltrates  Patient is comfortable on room air  Continue ceftriaxone  At present would classify this as mild disease.  Cover with remdesivir to prevent progression  I am unsure the utility of checking D-dimer and inflammatory markers inpatient with active cystitis and chronic cholecystitis.  Will hold off for now                 VTE Pharmacologic Prophylaxis:   Moderate Risk (Score 3-4) - Pharmacological DVT Prophylaxis Ordered: apixaban (Eliquis).    Mobility:   Basic Mobility Inpatient Raw Score: 12  JH-HLM Goal: 4: Move to chair/commode  JH-HLM Achieved: 4: Move to chair/commode  JH-HLM Goal achieved. Continue to encourage appropriate mobility.    Patient Centered Rounds: I performed bedside rounds with nursing staff today.   Discussions with Specialists or Other Care Team Provider:     Education and Discussions with Family / Patient: Patient declined call to .     Total Time Spent on Date of Encounter in care of patient:  mins. This time was spent on one or more of the following: performing physical exam; counseling and coordination of care; obtaining or reviewing history; documenting in the medical record; reviewing/ordering tests, medications or procedures; communicating with other healthcare professionals and discussing with patient's family/caregivers.    Current Length of Stay: 4 day(s)  Current Patient Status: Inpatient   Certification Statement: The patient will continue to require additional inpatient hospital stay due to placement tomorrow   Discharge Plan: Anticipate discharge tomorrow to snf    Code Status: Level 3 - DNAR and DNI    Subjective:   Patient denies billy cute complaints    Objective:     Vitals:   Temp (24hrs),  Av.6 °F (36.4 °C), Min:97.5 °F (36.4 °C), Max:97.7 °F (36.5 °C)    Temp:  [97.5 °F (36.4 °C)-97.7 °F (36.5 °C)] 97.5 °F (36.4 °C)  HR:  [54-63] 58  Resp:  [16] 16  BP: (142-179)/(63-81) 179/81  SpO2:  [93 %-95 %] 94 %  Body mass index is 20.12 kg/m².     Input and Output Summary (last 24 hours):     Intake/Output Summary (Last 24 hours) at 2024 1221  Last data filed at 8/15/2024 2100  Gross per 24 hour   Intake --   Output 100 ml   Net -100 ml       Physical Exam:   Physical Exam  Vitals and nursing note reviewed.   Constitutional:       General: She is not in acute distress.     Appearance: She is well-developed. She is not toxic-appearing or diaphoretic.   HENT:      Head: Normocephalic and atraumatic.   Eyes:      General: No scleral icterus.     Conjunctiva/sclera: Conjunctivae normal.   Cardiovascular:      Rate and Rhythm: Normal rate and regular rhythm.      Heart sounds: No murmur heard.     No friction rub. No gallop.   Pulmonary:      Effort: Pulmonary effort is normal. No respiratory distress.      Breath sounds: Normal breath sounds. No stridor. No wheezing, rhonchi or rales.   Chest:      Chest wall: No tenderness.   Abdominal:      General: There is no distension.      Palpations: Abdomen is soft. There is no mass.      Tenderness: There is no abdominal tenderness. There is no guarding or rebound.      Hernia: No hernia is present.   Musculoskeletal:         General: No swelling or tenderness.      Cervical back: Neck supple.   Skin:     General: Skin is warm and dry.      Capillary Refill: Capillary refill takes less than 2 seconds.   Neurological:      Mental Status: She is alert and oriented to person, place, and time.   Psychiatric:         Mood and Affect: Mood normal.          Additional Data:     Labs:  Results from last 7 days   Lab Units 08/15/24  0517   WBC Thousand/uL 6.07   HEMOGLOBIN g/dL 9.8*   HEMATOCRIT % 30.4*   PLATELETS Thousands/uL 186   SEGS PCT % 72   LYMPHO PCT % 23    MONO PCT % 5   EOS PCT % 0     Results from last 7 days   Lab Units 08/15/24  0517 08/13/24  0521 08/12/24  1405   SODIUM mmol/L 135   < > 134*   POTASSIUM mmol/L 4.1   < > 4.2   CHLORIDE mmol/L 101   < > 101   CO2 mmol/L 27   < > 29   BUN mg/dL 25   < > 19   CREATININE mg/dL 0.42*   < > 0.52*   ANION GAP mmol/L 7   < > 4   CALCIUM mg/dL 7.9*   < > 8.2*   ALBUMIN g/dL  --   --  2.7*   TOTAL BILIRUBIN mg/dL  --   --  0.38   ALK PHOS U/L  --   --  58   ALT U/L  --   --  20   AST U/L  --   --  13   GLUCOSE RANDOM mg/dL 120   < > 112    < > = values in this interval not displayed.     Results from last 7 days   Lab Units 08/13/24  0521   INR  1.37*     Results from last 7 days   Lab Units 08/12/24  1401   POC GLUCOSE mg/dl 110         Results from last 7 days   Lab Units 08/13/24  0521 08/12/24  1405   LACTIC ACID mmol/L  --  1.0   PROCALCITONIN ng/ml 0.21 0.16       Lines/Drains:  Invasive Devices       Peripheral Intravenous Line  Duration             Peripheral IV 08/12/24 Left Antecubital 3 days    Peripheral IV 08/12/24 Right Antecubital 3 days              Drain  Duration             Open Drain RLQ 17 days    Urethral Catheter 16 Fr. 1 day                  Urinary Catheter:  Goal for removal: N/A- Discharging with Akins               Imaging: No pertinent imaging reviewed.    Recent Cultures (last 7 days):   Results from last 7 days   Lab Units 08/12/24  1406 08/12/24  1405   BLOOD CULTURE   --  No Growth at 72 hrs.  No Growth at 72 hrs.   URINE CULTURE  >100,000 cfu/ml Candida lusitaniae*  --        Last 24 Hours Medication List:   Current Facility-Administered Medications   Medication Dose Route Frequency Provider Last Rate    acetaminophen  650 mg Oral Q6H PRN Nick Lai MD      aluminum-magnesium hydroxide-simethicone  30 mL Oral Q4H PRN Nick Lai MD      apixaban  2.5 mg Oral BID Geoffrey Alex DO      atorvastatin  40 mg Oral Daily Michael Bowman      calcium carbonate-vitamin D  1 tablet Oral  Every Other Day Michael Bowman      dexamethasone  6 mg Intravenous Daily Geoffrey Alex DO      diltiazem  240 mg Oral Daily Michael Bowman      docusate sodium  100 mg Oral BID Michael Bowman      fluticasone  1 spray Each Nare BID Michael Bowman      levothyroxine  88 mcg Oral Early Morning Michael Bowman      lisinopril  20 mg Oral Daily Michael Bowman      metoprolol succinate  100 mg Oral BID Michael Bowman      mirtazapine  15 mg Oral HS Michael Bowman      ondansetron  4 mg Intravenous Once Marcial Ramirez DO      polyethylene glycol  17 g Oral Daily PRN Michael Bowman      remdesivir  100 mg Intravenous Q24H Michael Bowman 0 mg (08/13/24 2030)    sertraline  50 mg Oral Daily Michael Bowman          Today, Patient Was Seen By: Geoffrey Alex DO    **Please Note: This note may have been constructed using a voice recognition system.**

## 2024-08-16 NOTE — ASSESSMENT & PLAN NOTE
Troponin in the ED elevated to 109->74  Patient without chest pain and EKG does not appear to show any acute ischemic appearing changes  Suspect this is some demand ischemia in the setting of COVID infection  Monitor on telemetry for 24 hours  Resume eliquis   Muscle Hinge Flap Text: The defect edges were debeveled with a #15 scalpel blade.  Given the size, depth and location of the defect and the proximity to free margins a muscle hinge flap was deemed most appropriate.  Using a sterile surgical marker, an appropriate hinge flap was drawn incorporating the defect. The area thus outlined was incised with a #15 scalpel blade.  The skin margins were undermined to an appropriate distance in all directions utilizing iris scissors.

## 2024-08-16 NOTE — ASSESSMENT & PLAN NOTE
Platelet 117, appears lower than previously recorded plt counts from last hospitalization which was in the range of 200s-230s   CBC 3 days before admission in [facility] network noted plt count 121  Patient was recently hospitalized at our facility from 7/29-8 2, however it does not appear as though she received heparin products because she is on apixaban  Suspect this is consumptive in the setting of of COVID and possibly cystitis.  Low concern for TMA/DIC, however will continue to monitor with repeat coags and CBC in the morning

## 2024-08-16 NOTE — PROGRESS NOTES
Patient:    MRN:  [id number]    Aidin Request ID:  [id number]    Level of care reserved:  Skilled Nursing Facility    Partner Reserved:  [facility] [place] [id number] [phone]    Clinical needs requested:    Geography searched:  10 miles around [address]    Start of Service:    Request sent:  1:29pm EDT on 8/13/2024 by Rl Calle    Partner reserved:  10:16am EDT on 8/15/2024 by Rl Calle    Choice list shared:  10:15am EDT on 8/15/2024 by Rl Calle

## 2024-08-16 NOTE — CASE MANAGEMENT
Case Management Progress Note    Patient name Jennifer Patel  Location [facility] 633/[facility] 633-01 MRN [id number]  : 1935 Date 2024       LOS (days): 4  Geometric Mean LOS (GMLOS) (days): 5  Days to GMLOS:1.3        OBJECTIVE:        Current admission status: Inpatient  Preferred Pharmacy:   [facility] - [place], [place] - [address]  [address]  [place] [address]  Phone: [phone] Fax: [phone]    Primary Care Provider: Curtis Jackson MD    Primary Insurance: MEDICARE  Secondary Insurance: [employer]    PROGRESS NOTE:    Pt was unable to d/c yesterday due to needing one more dose of abx.  [facility] also needed to confirm if they could still accept pt due to COVID status. After further investigation, [facility] reported [facility] could no longer accept pt today because of her Covid positive status. [facility] still able to accept but requires a 7 day isolation precaution when a pt is a confirmed Covid case. Due to the pt being confirmed positive 24 the earliest she can be accept back is 24. This CM has confirmed with [facility] pt can return 24. Son Juan Manuel has been made aware and in agreement with [facility].

## 2024-08-16 NOTE — PLAN OF CARE
Problem: PAIN - ADULT  Goal: Verbalizes/displays adequate comfort level or baseline comfort level  Description: Interventions:  - Encourage patient to monitor pain and request assistance  - Assess pain using appropriate pain scale  - Administer analgesics based on type and severity of pain and evaluate response  - Implement non-pharmacological measures as appropriate and evaluate response  - Consider cultural and social influences on pain and pain management  - Notify physician/advanced practitioner if interventions unsuccessful or patient reports new pain  Outcome: Progressing     Problem: INFECTION - ADULT  Goal: Absence or prevention of progression during hospitalization  Description: INTERVENTIONS:  - Assess and monitor for signs and symptoms of infection  - Monitor lab/diagnostic results  - Monitor all insertion sites, i.e. indwelling lines, tubes, and drains  - Monitor endotracheal if appropriate and nasal secretions for changes in amount and color  - [place] appropriate cooling/warming therapies per order  - Administer medications as ordered  - Instruct and encourage patient and family to use good hand hygiene technique  - Identify and instruct in appropriate isolation precautions for identified infection/condition  Outcome: Progressing  Goal: Absence of fever/infection during neutropenic period  Description: INTERVENTIONS:  - Monitor WBC    Outcome: Progressing     Problem: SAFETY ADULT  Goal: Patient will remain free of falls  Description: INTERVENTIONS:  - Educate patient/family on patient safety including physical limitations  - Instruct patient to call for assistance with activity   - Consult OT/PT to assist with strengthening/mobility   - Keep Call bell within reach  - Keep bed low and locked with side rails adjusted as appropriate  - Keep care items and personal belongings within reach  - Initiate and maintain comfort rounds  - Make Fall Risk Sign visible to staff  - Offer Toileting every 2 Hours,  in advance of need  - Initiate/Maintain alarm  - Obtain necessary fall risk management equipment:   - Apply yellow socks and bracelet for high fall risk patients  - Consider moving patient to room near nurses station  Outcome: Progressing  Goal: Maintain or return to baseline ADL function  Description: INTERVENTIONS:  -  Assess patient's ability to carry out ADLs; assess patient's baseline for ADL function and identify physical deficits which impact ability to perform ADLs (bathing, care of mouth/teeth, toileting, grooming, dressing, etc.)  - Assess/evaluate cause of self-care deficits   - Assess range of motion  - Assess patient's mobility; develop plan if impaired  - Assess patient's need for assistive devices and provide as appropriate  - Encourage maximum independence but intervene and supervise when necessary  - Involve family in performance of ADLs  - Assess for home care needs following discharge   - Consider OT consult to assist with ADL evaluation and planning for discharge  - Provide patient education as appropriate  Outcome: Progressing  Goal: Maintains/Returns to pre admission functional level  Description: INTERVENTIONS:  - Perform AM-PAC 6 Click Basic Mobility/ Daily Activity assessment daily.  - Set and communicate daily mobility goal to care team and patient/family/caregiver.   - Collaborate with rehabilitation services on mobility goals if consulted  - Perform Range of Motion 3 times a day.  - Reposition patient every 2 hours.  - Dangle patient 3 times a day  - Stand patient 3 times a day  - Ambulate patient 3 times a day  - Out of bed to chair 3 times a day   - Out of bed for meals 3 times a day  - Out of bed for toileting  - Record patient progress and toleration of activity level   Outcome: Progressing     Problem: DISCHARGE PLANNING  Goal: Discharge to home or other facility with appropriate resources  Description: INTERVENTIONS:  - Identify barriers to discharge w/patient and caregiver  -  Arrange for needed discharge resources and transportation as appropriate  - Identify discharge learning needs (meds, wound care, etc.)  - Arrange for interpretive services to assist at discharge as needed  - Refer to Case Management Department for coordinating discharge planning if the patient needs post-hospital services based on physician/advanced practitioner order or complex needs related to functional status, cognitive ability, or social support system  Outcome: Progressing     Problem: Knowledge Deficit  Goal: Patient/family/caregiver demonstrates understanding of disease process, treatment plan, medications, and discharge instructions  Description: Complete learning assessment and assess knowledge base.  Interventions:  - Provide teaching at level of understanding  - Provide teaching via preferred learning methods  Outcome: Progressing     Problem: Prexisting or High Potential for Compromised Skin Integrity  Goal: Skin integrity is maintained or improved  Description: INTERVENTIONS:  - Identify patients at risk for skin breakdown  - Assess and monitor skin integrity  - Assess and monitor nutrition and hydration status  - Monitor labs   - Assess for incontinence   - Turn and reposition patient  - Assist with mobility/ambulation  - Relieve pressure over bony prominences  - Avoid friction and shearing  - Provide appropriate hygiene as needed including keeping skin clean and dry  - Evaluate need for skin moisturizer/barrier cream  - Collaborate with interdisciplinary team   - Patient/family teaching  - Consider wound care consult   Outcome: Progressing

## 2024-08-16 NOTE — CASE MANAGEMENT
Case Management Discharge Planning Note    Patient name Jennifer Patel  Location [facility]-01 MRN [id number]  : 1935 Date 2024       Current Admission Date: 2024  Current Admission Diagnosis:COVID   Patient Active Problem List    Diagnosis Date Noted Date Diagnosed    Pneumonia 08/15/2024     COVID 2024     Constipation 2024     Anemia 2024     Thrombocytopenia (HCC) 2024     Nausea and vomiting 2024     Cystitis 2024     Elevated troponin 2024     Electrolyte abnormality 2024     Abnormal urinalysis 2024     Severe protein-calorie malnutrition (HCC) 2024     Unsteadiness on feet 2024     Stage 3 chronic kidney disease (HCC) 2024     Failure to thrive in adult 2024     Cholecystitis 2024     Paroxysmal atrial fibrillation with RVR (HCC) 2023     Coronary artery disease involving native coronary artery of native heart without angina pectoris 10/19/2018     Depressive disorder 2008     Benign essential hypertension 2007       LOS (days): 4  Geometric Mean LOS (GMLOS) (days): 5  Days to GMLOS:1.1     OBJECTIVE:  Risk of Unplanned Readmission Score: 19.86         Current admission status: Inpatient   Preferred Pharmacy:   [facility] - [address]  [address]  [facility] [address]  Phone: [phone] Fax: [phone]    Primary Care Provider: Curtis Jackson MD    Primary Insurance: MEDICARE  Secondary Insurance: [place] [employer]    DISCHARGE DETAILS:       Dispatcher Contacted: Yes  Number/Name of Dispatcher: SLETS  Transported by (Company and Unit #): [facility]  ETA of Transport (Date): 24  ETA of Transport (Time): 1130     Transfer Mode: Wheelchair

## 2024-08-16 NOTE — ASSESSMENT & PLAN NOTE
Patient noted to have hemoglobin 8.7 on admission, this is noted to be reduced from recent blood work from around 2 days ago with hemoglobins ranging from 10-12.  Care everywhere has a CBC ordered from [facility] showing hgb 8.6 on 8/9  Fortunately likely less concerning for acute bleed  Patient noted to be Hemoccult positive in the ED, CT showing constipation with large stool burden  Resume eliquis  Urinalysis and Urine micro does note large blood,, however after Akins was placed, urine does not appear to be grossly bloody.  Suspect that anemia secondary to gross hematuria would be more clinically apparent, and patient seems like a reasonable enough historian to notice this

## 2024-08-17 VITALS
WEIGHT: 110 LBS | HEART RATE: 62 BPM | SYSTOLIC BLOOD PRESSURE: 153 MMHG | OXYGEN SATURATION: 96 % | BODY MASS INDEX: 20.24 KG/M2 | DIASTOLIC BLOOD PRESSURE: 73 MMHG | HEIGHT: 62 IN | TEMPERATURE: 97.1 F | RESPIRATION RATE: 18 BRPM

## 2024-08-17 LAB
ALBUMIN SERPL BCG-MCNC: 2.4 G/DL (ref 3.5–5)
ALP SERPL-CCNC: 51 U/L (ref 34–104)
ALT SERPL W P-5'-P-CCNC: 26 U/L (ref 7–52)
ANION GAP SERPL CALCULATED.3IONS-SCNC: 8 MMOL/L (ref 4–13)
AST SERPL W P-5'-P-CCNC: 13 U/L (ref 13–39)
BACTERIA BLD CULT: NORMAL
BACTERIA BLD CULT: NORMAL
BASOPHILS # BLD AUTO: 0.01 THOUSANDS/ÂΜL (ref 0–0.1)
BASOPHILS NFR BLD AUTO: 0 % (ref 0–1)
BILIRUB SERPL-MCNC: 0.27 MG/DL (ref 0.2–1)
BUN SERPL-MCNC: 19 MG/DL (ref 5–25)
CALCIUM ALBUM COR SERPL-MCNC: 8.7 MG/DL (ref 8.3–10.1)
CALCIUM SERPL-MCNC: 7.4 MG/DL (ref 8.4–10.2)
CHLORIDE SERPL-SCNC: 103 MMOL/L (ref 96–108)
CO2 SERPL-SCNC: 25 MMOL/L (ref 21–32)
CREAT SERPL-MCNC: 0.43 MG/DL (ref 0.6–1.3)
EOSINOPHIL # BLD AUTO: 0 THOUSAND/ÂΜL (ref 0–0.61)
EOSINOPHIL NFR BLD AUTO: 0 % (ref 0–6)
ERYTHROCYTE [DISTWIDTH] IN BLOOD BY AUTOMATED COUNT: 15.2 % (ref 11.6–15.1)
GFR SERPL CREATININE-BSD FRML MDRD: 91 ML/MIN/1.73SQ M
GLUCOSE SERPL-MCNC: 135 MG/DL (ref 65–140)
HCT VFR BLD AUTO: 30.8 % (ref 34.8–46.1)
HGB BLD-MCNC: 10.1 G/DL (ref 11.5–15.4)
IMM GRANULOCYTES # BLD AUTO: 0.08 THOUSAND/UL (ref 0–0.2)
IMM GRANULOCYTES NFR BLD AUTO: 1 % (ref 0–2)
LYMPHOCYTES # BLD AUTO: 1.14 THOUSANDS/ÂΜL (ref 0.6–4.47)
LYMPHOCYTES NFR BLD AUTO: 14 % (ref 14–44)
MCH RBC QN AUTO: 31.1 PG (ref 26.8–34.3)
MCHC RBC AUTO-ENTMCNC: 32.8 G/DL (ref 31.4–37.4)
MCV RBC AUTO: 95 FL (ref 82–98)
MONOCYTES # BLD AUTO: 0.39 THOUSAND/ÂΜL (ref 0.17–1.22)
MONOCYTES NFR BLD AUTO: 5 % (ref 4–12)
NEUTROPHILS # BLD AUTO: 6.73 THOUSANDS/ÂΜL (ref 1.85–7.62)
NEUTS SEG NFR BLD AUTO: 80 % (ref 43–75)
NRBC BLD AUTO-RTO: 0 /100 WBCS
PLATELET # BLD AUTO: 215 THOUSANDS/UL (ref 149–390)
PMV BLD AUTO: 10.3 FL (ref 8.9–12.7)
POTASSIUM SERPL-SCNC: 3.5 MMOL/L (ref 3.5–5.3)
PROT SERPL-MCNC: 4.6 G/DL (ref 6.4–8.4)
RBC # BLD AUTO: 3.25 MILLION/UL (ref 3.81–5.12)
SODIUM SERPL-SCNC: 136 MMOL/L (ref 135–147)
WBC # BLD AUTO: 8.35 THOUSAND/UL (ref 4.31–10.16)

## 2024-08-17 PROCEDURE — 85025 COMPLETE CBC W/AUTO DIFF WBC: CPT | Performed by: INTERNAL MEDICINE

## 2024-08-17 PROCEDURE — 80053 COMPREHEN METABOLIC PANEL: CPT | Performed by: INTERNAL MEDICINE

## 2024-08-17 PROCEDURE — 97530 THERAPEUTIC ACTIVITIES: CPT

## 2024-08-17 PROCEDURE — 97112 NEUROMUSCULAR REEDUCATION: CPT

## 2024-08-17 PROCEDURE — 99239 HOSP IP/OBS DSCHRG MGMT >30: CPT | Performed by: INTERNAL MEDICINE

## 2024-08-17 RX ADMIN — SERTRALINE HYDROCHLORIDE 50 MG: 50 TABLET ORAL at 09:31

## 2024-08-17 RX ADMIN — LISINOPRIL 20 MG: 20 TABLET ORAL at 09:32

## 2024-08-17 RX ADMIN — METOPROLOL SUCCINATE 100 MG: 100 TABLET, EXTENDED RELEASE ORAL at 09:32

## 2024-08-17 RX ADMIN — APIXABAN 2.5 MG: 2.5 TABLET, FILM COATED ORAL at 09:31

## 2024-08-17 RX ADMIN — DOCUSATE SODIUM 100 MG: 100 CAPSULE, LIQUID FILLED ORAL at 09:31

## 2024-08-17 RX ADMIN — LEVOTHYROXINE SODIUM 88 MCG: 88 TABLET ORAL at 06:27

## 2024-08-17 RX ADMIN — DILTIAZEM HYDROCHLORIDE 240 MG: 240 CAPSULE, EXTENDED RELEASE ORAL at 09:31

## 2024-08-17 RX ADMIN — ATORVASTATIN CALCIUM 40 MG: 40 TABLET, FILM COATED ORAL at 09:32

## 2024-08-17 RX ADMIN — FLUTICASONE PROPIONATE 1 SPRAY: 50 SPRAY, METERED NASAL at 09:35

## 2024-08-17 RX ADMIN — DEXAMETHASONE SODIUM PHOSPHATE 6 MG: 10 INJECTION INTRAMUSCULAR; INTRAVENOUS at 09:32

## 2024-08-17 NOTE — PHYSICAL THERAPY NOTE
Physical Therapy Progress Note     08/17/24 0830   PT Last Visit   PT Visit Date 08/17/24   Note Type   Note Type Treatment   Pain Assessment   Pain Assessment Tool 0-10   Pain Score No Pain   Restrictions/Precautions   Other Precautions Airborne/isolation;Chair Alarm;Bed Alarm;Cognitive;Fall Risk;O2  (Alarm active post session.)   Subjective   Subjective The patient reports that she slept well and she feels well.   Bed Mobility   Supine to Sit 3  Moderate assistance   Additional items Assist x 1;Increased time required;Verbal cues;LE management   Sit to Supine 4  Minimal assistance   Additional items Assist x 1;Increased time required;Verbal cues;LE management   Transfers   Sit to Stand 3  Moderate assistance   Additional items Assist x 1;Increased time required;Verbal cues   Stand to Sit 3  Moderate assistance   Additional items Assist x 1;Increased time required;Verbal cues   Ambulation/Elevation   Gait pattern Excessively slow;Step to;Short stride;Inconsistent bridget;Decreased foot clearance;Improper Weight shift   Gait Assistance 3  Moderate assist   Additional items Assist x 1;Verbal cues   Assistive Device Rolling walker   Distance 6 feet x 2, 5 feet.   Balance   Static Sitting Fair   Dynamic Sitting Fair -   Static Standing Poor +   Dynamic Standing Poor   Ambulatory Poor   Activity Tolerance   Activity Tolerance Patient tolerated treatment well;Patient limited by fatigue   Exercises   Knee AROM Long Arc Quad Sitting;10 reps;AROM;Bilateral   Ankle Pumps Sitting;20 reps;AROM;Bilateral   Marching Sitting;10 reps;AROM;Bilateral   Assessment   Prognosis Good   Problem List Decreased strength;Decreased range of motion;Decreased endurance;Impaired balance;Decreased mobility;Pain   Assessment The patient was able to take a few more steps today, but she continues to fatigue easily. She had improvement in her transfers and balance with increased standing tolerance. She benefits from rests in-between all activities  in order to recover. She will certainly benefit from continued therapies in order to maximize her functional mobility and safety.   Goals   Patient Goals To keep feeling better.   STG Expiration Date 08/28/24   PT Treatment Day 1   Plan   Treatment/Interventions Functional transfer training;LE strengthening/ROM;Therapeutic exercise;Endurance training;Cognitive reorientation;Patient/family training;Bed mobility;Gait training;Elevations   Progress Progressing toward goals   PT Frequency 3-5x/wk   Discharge Recommendation   Rehab Resource Intensity Level, PT II (Moderate Resource Intensity)   Equipment Recommended Walker   AM-PAC Basic Mobility Inpatient   Turning in Flat Bed Without Bedrails 3   Lying on Back to Sitting on Edge of Flat Bed Without Bedrails 2   Moving Bed to Chair 2   Standing Up From Chair Using Arms 2   Walk in Room 2   Climb 3-5 Stairs With Railing 1   Basic Mobility Inpatient Raw Score 12   Basic Mobility Standardized Score 32.23   [facility] Highest Level Of Mobility   -[facility] Goal 4: Move to chair/commode   -HLM Achieved 6: Walk 10 steps or more         An AM-PAC Basic Mobility raw score less than 16 suggests the patient may benefit from discharge to post-acute rehab services.    James Velazquez, PTA

## 2024-08-17 NOTE — ASSESSMENT & PLAN NOTE
Lab Results   Component Value Date    EGFR 91 08/17/2024    EGFR 91 08/15/2024    EGFR 93 08/13/2024    CREATININE 0.43 (L) 08/17/2024    CREATININE 0.42 (L) 08/15/2024    CREATININE 0.40 (L) 08/13/2024   Renal function appears baseline

## 2024-08-17 NOTE — ASSESSMENT & PLAN NOTE
Patient sent to ED from [facility] noting with lethargy, fever, and vomiting  Reported temperature of 101.2 today  Patient also noted to have new oxygen need  She was brought to the ED where she was found to have infiltrate on x-ray and COVID-19 positive.  CT chest showing bilateral pleural effusion with some groundglass appearing infiltrates  Patient is comfortable on room air  Continue decadron on dc for 3 days

## 2024-08-17 NOTE — ASSESSMENT & PLAN NOTE
Platelet 117, appears lower than previously recorded plt counts from last hospitalization which was in the range of 200s-230s   CBC 3 days before admission in [facility] network noted plt count 121  Patient was recently hospitalized at our facility from 7/29-8 2, however it does not appear as though she received heparin products because she is on apixaban

## 2024-08-17 NOTE — PLAN OF CARE
Problem: PAIN - ADULT  Goal: Verbalizes/displays adequate comfort level or baseline comfort level  Description: Interventions:  - Encourage patient to monitor pain and request assistance  - Assess pain using appropriate pain scale  - Administer analgesics based on type and severity of pain and evaluate response  - Implement non-pharmacological measures as appropriate and evaluate response  - Consider cultural and social influences on pain and pain management  - Notify physician/advanced practitioner if interventions unsuccessful or patient reports new pain  8/17/2024 1150 by Nathalia Awan RN  Outcome: Adequate for Discharge  8/17/2024 0804 by Nathalia Awan RN  Outcome: Progressing  8/17/2024 0804 by Nathalia Awan RN  Outcome: Progressing     Problem: INFECTION - ADULT  Goal: Absence or prevention of progression during hospitalization  Description: INTERVENTIONS:  - Assess and monitor for signs and symptoms of infection  - Monitor lab/diagnostic results  - Monitor all insertion sites, i.e. indwelling lines, tubes, and drains  - Monitor endotracheal if appropriate and nasal secretions for changes in amount and color  - [place] appropriate cooling/warming therapies per order  - Administer medications as ordered  - Instruct and encourage patient and family to use good hand hygiene technique  - Identify and instruct in appropriate isolation precautions for identified infection/condition  8/17/2024 1150 by Nathalia Awan RN  Outcome: Adequate for Discharge  8/17/2024 0804 by Nathalia Awan RN  Outcome: Progressing  8/17/2024 0804 by Nathalia Awan RN  Outcome: Progressing  Goal: Absence of fever/infection during neutropenic period  Description: INTERVENTIONS:  - Monitor WBC    8/17/2024 1150 by Nathalia Awan RN  Outcome: Adequate for Discharge  8/17/2024 0804 by Nathalia Awan RN  Outcome: Progressing  8/17/2024 0804 by Nathalia Awan RN  Outcome: Progressing     Problem: SAFETY ADULT  Goal:  Patient will remain free of falls  Description: INTERVENTIONS:  - Educate patient/family on patient safety including physical limitations  - Instruct patient to call for assistance with activity   - Consult OT/PT to assist with strengthening/mobility   - Keep Call bell within reach  - Keep bed low and locked with side rails adjusted as appropriate  - Keep care items and personal belongings within reach  - Initiate and maintain comfort rounds  - Make Fall Risk Sign visible to staff  - Offer Toileting every 2 Hours, in advance of need  - Initiate/Maintain alarm  - Obtain necessary fall risk management equipment:   - Apply yellow socks and bracelet for high fall risk patients  - Consider moving patient to room near nurses station  8/17/2024 1150 by Nathalia Awan RN  Outcome: Adequate for Discharge  8/17/2024 0804 by Nathalia Awan RN  Outcome: Progressing  8/17/2024 0804 by Nathalia Awan RN  Outcome: Progressing  Goal: Maintain or return to baseline ADL function  Description: INTERVENTIONS:  -  Assess patient's ability to carry out ADLs; assess patient's baseline for ADL function and identify physical deficits which impact ability to perform ADLs (bathing, care of mouth/teeth, toileting, grooming, dressing, etc.)  - Assess/evaluate cause of self-care deficits   - Assess range of motion  - Assess patient's mobility; develop plan if impaired  - Assess patient's need for assistive devices and provide as appropriate  - Encourage maximum independence but intervene and supervise when necessary  - Involve family in performance of ADLs  - Assess for home care needs following discharge   - Consider OT consult to assist with ADL evaluation and planning for discharge  - Provide patient education as appropriate  8/17/2024 1150 by Nathalia Awan RN  Outcome: Adequate for Discharge  8/17/2024 0804 by Nathalia Awan RN  Outcome: Progressing  8/17/2024 0804 by Nathalia Awan RN  Outcome: Progressing  Goal:  Maintains/Returns to pre admission functional level  Description: INTERVENTIONS:  - Perform AM-PAC 6 Click Basic Mobility/ Daily Activity assessment daily.  - Set and communicate daily mobility goal to care team and patient/family/caregiver.   - Collaborate with rehabilitation services on mobility goals if consulted  - Perform Range of Motion 3 times a day.  - Reposition patient every 2 hours.  - Dangle patient 3 times a day  - Stand patient 3 times a day  - Ambulate patient 3 times a day  - Out of bed to chair 3 times a day   - Out of bed for meals 3 times a day  - Out of bed for toileting  - Record patient progress and toleration of activity level   8/17/2024 1150 by Nathalia Awan RN  Outcome: Adequate for Discharge  8/17/2024 0804 by Nathalia Awan RN  Outcome: Progressing  8/17/2024 0804 by Nathalia Awan RN  Outcome: Progressing     Problem: DISCHARGE PLANNING  Goal: Discharge to home or other facility with appropriate resources  Description: INTERVENTIONS:  - Identify barriers to discharge w/patient and caregiver  - Arrange for needed discharge resources and transportation as appropriate  - Identify discharge learning needs (meds, wound care, etc.)  - Arrange for interpretive services to assist at discharge as needed  - Refer to Case Management Department for coordinating discharge planning if the patient needs post-hospital services based on physician/advanced practitioner order or complex needs related to functional status, cognitive ability, or social support system  8/17/2024 1150 by Nathalia Awan RN  Outcome: Adequate for Discharge  8/17/2024 0804 by Nathalia Awan RN  Outcome: Progressing  8/17/2024 0804 by Nathalia Awan RN  Outcome: Progressing     Problem: Knowledge Deficit  Goal: Patient/family/caregiver demonstrates understanding of disease process, treatment plan, medications, and discharge instructions  Description: Complete learning assessment and assess knowledge  base.  Interventions:  - Provide teaching at level of understanding  - Provide teaching via preferred learning methods  8/17/2024 1150 by Nathalia Awan RN  Outcome: Adequate for Discharge  8/17/2024 0804 by Nathalia Awan RN  Outcome: Progressing  8/17/2024 0804 by Nathalia Awan RN  Outcome: Progressing     Problem: Prexisting or High Potential for Compromised Skin Integrity  Goal: Skin integrity is maintained or improved  Description: INTERVENTIONS:  - Identify patients at risk for skin breakdown  - Assess and monitor skin integrity  - Assess and monitor nutrition and hydration status  - Monitor labs   - Assess for incontinence   - Turn and reposition patient  - Assist with mobility/ambulation  - Relieve pressure over bony prominences  - Avoid friction and shearing  - Provide appropriate hygiene as needed including keeping skin clean and dry  - Evaluate need for skin moisturizer/barrier cream  - Collaborate with interdisciplinary team   - Patient/family teaching  - Consider wound care consult   8/17/2024 1150 by Nathalia Awan RN  Outcome: Adequate for Discharge  8/17/2024 0804 by Nathalia Awan RN  Outcome: Progressing  8/17/2024 0804 by Nathalia Awan RN  Outcome: Progressing     Problem: RESPIRATORY - ADULT  Goal: Achieves optimal ventilation and oxygenation  Description: INTERVENTIONS:  - Assess for changes in respiratory status  - Assess for changes in mentation and behavior  - Position to facilitate oxygenation and minimize respiratory effort  - Oxygen administered by appropriate delivery if ordered  - Initiate smoking cessation education as indicated  - Encourage broncho-pulmonary hygiene including cough, deep breathe, Incentive Spirometry  - Assess the need for suctioning and aspirate as needed  - Assess and instruct to report SOB or any respiratory difficulty  - Respiratory Therapy support as indicated  8/17/2024 1150 by Nathalia Awan RN  Outcome: Adequate for Discharge  8/17/2024 0804 by  Nathalia Awan RN  Outcome: Progressing

## 2024-08-17 NOTE — ASSESSMENT & PLAN NOTE
[facility] she is s/p percutaneous cholecystostomy tube placement  CT abd and pelvis in the ed demonstrating Cholelithiasis with pericholecystic inflammation suggestive of cholecystitis, possibly chronic noting similar findings on prior study. The gallbladder is again relatively decompressed with cholecystostomy tube terminating external to the gallbladder   lumen.  Recently completed course of amoxicillin  Continue routine cholecystostomy care and plan for outpatient surgery followup

## 2024-08-17 NOTE — ASSESSMENT & PLAN NOTE
Patient noted to have hemoglobin 8.7 on admission, this is noted to be reduced from recent blood work from around 2 days ago with hemoglobins ranging from 10-12.  Care everywhere has a CBC ordered from [facility] showing hgb 8.6 on 8/9  Fortunately likely less concerning for acute bleed  Patient noted to be Hemoccult positive in the ED, CT showing constipation with large stool burden  Resume eliquis

## 2024-08-17 NOTE — PLAN OF CARE
Problem: PAIN - ADULT  Goal: Verbalizes/displays adequate comfort level or baseline comfort level  Description: Interventions:  - Encourage patient to monitor pain and request assistance  - Assess pain using appropriate pain scale  - Administer analgesics based on type and severity of pain and evaluate response  - Implement non-pharmacological measures as appropriate and evaluate response  - Consider cultural and social influences on pain and pain management  - Notify physician/advanced practitioner if interventions unsuccessful or patient reports new pain  8/17/2024 0804 by Nathalia Awan RN  Outcome: Progressing  8/17/2024 0804 by Nathalia Awan RN  Outcome: Progressing     Problem: INFECTION - ADULT  Goal: Absence or prevention of progression during hospitalization  Description: INTERVENTIONS:  - Assess and monitor for signs and symptoms of infection  - Monitor lab/diagnostic results  - Monitor all insertion sites, i.e. indwelling lines, tubes, and drains  - Monitor endotracheal if appropriate and nasal secretions for changes in amount and color  - [place] appropriate cooling/warming therapies per order  - Administer medications as ordered  - Instruct and encourage patient and family to use good hand hygiene technique  - Identify and instruct in appropriate isolation precautions for identified infection/condition  8/17/2024 0804 by Nathalia Awan RN  Outcome: Progressing  8/17/2024 0804 by Nathalia Awan RN  Outcome: Progressing  Goal: Absence of fever/infection during neutropenic period  Description: INTERVENTIONS:  - Monitor WBC    8/17/2024 0804 by Nathalia Awan RN  Outcome: Progressing  8/17/2024 0804 by Nathalia Awan RN  Outcome: Progressing     Problem: SAFETY ADULT  Goal: Patient will remain free of falls  Description: INTERVENTIONS:  - Educate patient/family on patient safety including physical limitations  - Instruct patient to call for assistance with activity   - Consult OT/PT to  assist with strengthening/mobility   - Keep Call bell within reach  - Keep bed low and locked with side rails adjusted as appropriate  - Keep care items and personal belongings within reach  - Initiate and maintain comfort rounds  - Make Fall Risk Sign visible to staff  - Offer Toileting every 2 Hours, in advance of need  - Initiate/Maintain alarm  - Obtain necessary fall risk management equipment:   - Apply yellow socks and bracelet for high fall risk patients  - Consider moving patient to room near nurses station  8/17/2024 0804 by Nathalia Awan RN  Outcome: Progressing  8/17/2024 0804 by Nathalia Awan RN  Outcome: Progressing  Goal: Maintain or return to baseline ADL function  Description: INTERVENTIONS:  -  Assess patient's ability to carry out ADLs; assess patient's baseline for ADL function and identify physical deficits which impact ability to perform ADLs (bathing, care of mouth/teeth, toileting, grooming, dressing, etc.)  - Assess/evaluate cause of self-care deficits   - Assess range of motion  - Assess patient's mobility; develop plan if impaired  - Assess patient's need for assistive devices and provide as appropriate  - Encourage maximum independence but intervene and supervise when necessary  - Involve family in performance of ADLs  - Assess for home care needs following discharge   - Consider OT consult to assist with ADL evaluation and planning for discharge  - Provide patient education as appropriate  8/17/2024 0804 by Nathalia Awan RN  Outcome: Progressing  8/17/2024 0804 by Nathalia Awan RN  Outcome: Progressing  Goal: Maintains/Returns to pre admission functional level  Description: INTERVENTIONS:  - Perform AM-PAC 6 Click Basic Mobility/ Daily Activity assessment daily.  - Set and communicate daily mobility goal to care team and patient/family/caregiver.   - Collaborate with rehabilitation services on mobility goals if consulted  - Perform Range of Motion 3 times a day.  - Reposition  patient every 2 hours.  - Dangle patient 3 times a day  - Stand patient 3 times a day  - Ambulate patient 3 times a day  - Out of bed to chair 3 times a day   - Out of bed for meals 3 times a day  - Out of bed for toileting  - Record patient progress and toleration of activity level   8/17/2024 0804 by Nathalia Awan RN  Outcome: Progressing  8/17/2024 0804 by Nathalia Awan RN  Outcome: Progressing     Problem: DISCHARGE PLANNING  Goal: Discharge to home or other facility with appropriate resources  Description: INTERVENTIONS:  - Identify barriers to discharge w/patient and caregiver  - Arrange for needed discharge resources and transportation as appropriate  - Identify discharge learning needs (meds, wound care, etc.)  - Arrange for interpretive services to assist at discharge as needed  - Refer to Case Management Department for coordinating discharge planning if the patient needs post-hospital services based on physician/advanced practitioner order or complex needs related to functional status, cognitive ability, or social support system  8/17/2024 0804 by Nathalia Awan RN  Outcome: Progressing  8/17/2024 0804 by Nathalia Awan RN  Outcome: Progressing     Problem: Knowledge Deficit  Goal: Patient/family/caregiver demonstrates understanding of disease process, treatment plan, medications, and discharge instructions  Description: Complete learning assessment and assess knowledge base.  Interventions:  - Provide teaching at level of understanding  - Provide teaching via preferred learning methods  8/17/2024 0804 by Nathalia Awan RN  Outcome: Progressing  8/17/2024 0804 by Nathalia Awan RN  Outcome: Progressing     Problem: Prexisting or High Potential for Compromised Skin Integrity  Goal: Skin integrity is maintained or improved  Description: INTERVENTIONS:  - Identify patients at risk for skin breakdown  - Assess and monitor skin integrity  - Assess and monitor nutrition and hydration status  -  Monitor labs   - Assess for incontinence   - Turn and reposition patient  - Assist with mobility/ambulation  - Relieve pressure over bony prominences  - Avoid friction and shearing  - Provide appropriate hygiene as needed including keeping skin clean and dry  - Evaluate need for skin moisturizer/barrier cream  - Collaborate with interdisciplinary team   - Patient/family teaching  - Consider wound care consult   8/17/2024 0804 by Nathalia Awan RN  Outcome: Progressing  8/17/2024 0804 by Nathalia Awan RN  Outcome: Progressing     Problem: RESPIRATORY - ADULT  Goal: Achieves optimal ventilation and oxygenation  Description: INTERVENTIONS:  - Assess for changes in respiratory status  - Assess for changes in mentation and behavior  - Position to facilitate oxygenation and minimize respiratory effort  - Oxygen administered by appropriate delivery if ordered  - Initiate smoking cessation education as indicated  - Encourage broncho-pulmonary hygiene including cough, deep breathe, Incentive Spirometry  - Assess the need for suctioning and aspirate as needed  - Assess and instruct to report SOB or any respiratory difficulty  - Respiratory Therapy support as indicated  Outcome: Progressing

## 2024-08-17 NOTE — DISCHARGE SUMMARY
[facility]  Discharge- Jennifer Patel 1935, 89 y.o. female MRN: [id number]  Unit/Bed#: [facility] 633-01 Encounter: [id number]  Primary Care Provider: Curtis Jackson MD   Date and time admitted to hospital: 8/12/2024  1:11 PM    Pneumonia  Assessment & Plan  Penumonia secondary to covid with cxr showing ground glass opacities, completed course of abx    Elevated troponin  Assessment & Plan  Troponin in the ED elevated to 109->74  Patient without chest pain and EKG does not appear to show any acute ischemic appearing changes  Suspect this is some demand ischemia in the setting of COVID infection  Resume eliquis    Cystitis  Assessment & Plan  Urinalysis was concern for possible infection and CT showing thickened bladder wall concerning for cystitis  Akins placed in ED  Completed course of ceftriaxone    Nausea and vomiting  Assessment & Plan  Likely secondary to COVID-19 infection   Zofran as needed, gentle hydration will be ordered  Cholecystostomy tube appears to be functioning properly, no abdominal tenderness    Thrombocytopenia (HCC)  Assessment & Plan  Platelet 117, appears lower than previously recorded plt counts from last hospitalization which was in the range of 200s-230s   CBC 3 days before admission in [facility] noted plt count 121  Patient was recently hospitalized at our facility from 7/29-8 2, however it does not appear as though she received heparin products because she is on apixaban      Anemia  Assessment & Plan  Patient noted to have hemoglobin 8.7 on admission, this is noted to be reduced from recent blood work from around 2 days ago with hemoglobins ranging from 10-12.  Care everywhere has a CBC ordered from [facility] showing hgb 8.6 on 8/9  Fortunately likely less concerning for acute bleed  Patient noted to be Hemoccult positive in the ED, CT showing constipation with large stool burden  Resume eliquis      Constipation  Assessment & Plan  CT  demonstrating constipation with large stool burden  Ordered bowel regimen with MiraLAX, soapsuds enema, and docusate for medical therapy    Cholecystitis  Assessment & Plan  Diagnosed during 5/2024 hospitalization at [facility], she is s/p percutaneous cholecystostomy tube placement  CT abd and pelvis in the ed demonstrating Cholelithiasis with pericholecystic inflammation suggestive of cholecystitis, possibly chronic noting similar findings on prior study. The gallbladder is again relatively decompressed with cholecystostomy tube terminating external to the gallbladder   lumen.  Recently completed course of amoxicillin  Continue routine cholecystostomy care and plan for outpatient surgery followup    Paroxysmal atrial fibrillation with RVR (HCC)  Assessment & Plan  Rate controlled on diltiazem and toprol XL  continue eliquis, hgb stable    Stage 3 chronic kidney disease (HCC)  Assessment & Plan  Lab Results   Component Value Date    EGFR 91 08/17/2024    EGFR 91 08/15/2024    EGFR 93 08/13/2024    CREATININE 0.43 (L) 08/17/2024    CREATININE 0.42 (L) 08/15/2024    CREATININE 0.40 (L) 08/13/2024   Renal function appears baseline    Benign essential hypertension  Assessment & Plan  Resume home meds diltiazem 240 mg daily, lisinopril 20 mg daily, Toprol- mg p.o. twice daily    * COVID  Assessment & Plan  Patient sent to ED from [facility] noting with lethargy, fever, and vomiting  Reported temperature of 101.2 today  Patient also noted to have new oxygen need  She was brought to the ED where she was found to have infiltrate on x-ray and COVID-19 positive.  CT chest showing bilateral pleural effusion with some groundglass appearing infiltrates  Patient is comfortable on room air  Continue decadron on dc for 3 days          Medical Problems       Resolved Problems  Date Reviewed: 8/17/2024   None       Discharging Physician / Practitioner: Geoffrey Alex DO  PCP: Curtis Jackson MD  Admission Date:  "  Admission Orders (From admission, onward)       Ordered        08/12/24 1801  INPATIENT ADMISSION  Once                          Discharge Date: 08/17/24        Reason for Admission: nausea and vomtiing    Hospital Course:   Jennifer Patel is a 89 y.o. female patient who originally presented to the hospital on 8/12/2024 due to nausea and vomiting, she was found to have hypoxia requiring 2 L nasal cannula, she was also found to be febrile and positive for COVID, her symptoms improved with conservative management and with steroids and remdesivir, patient was stable throughout her hospitalization and was discharged back to her facility            Please see above list of diagnoses and related plan for additional information.     Condition at Discharge: stable    Discharge Day Visit / Exam:   Subjective:  patient denies any acute complaints, appetite improving  Vitals: Blood Pressure: 153/73 (08/17/24 0811)  Pulse: 62 (08/17/24 0811)  Temperature: (!) 97.1 °F (36.2 °C) (08/17/24 0811)  Temp Source: Oral (08/13/24 2232)  Respirations: 18 (08/17/24 0811)  Height: 5' 2\" (157.5 cm) (08/12/24 2246)  Weight - Scale: 49.9 kg (110 lb) (08/12/24 2246)  SpO2: 96 % (08/17/24 0811)  Exam:   Physical Exam  Vitals and nursing note reviewed.   Constitutional:       General: She is not in acute distress.     Appearance: She is well-developed. She is not toxic-appearing or diaphoretic.   HENT:      Head: Normocephalic and atraumatic.   Eyes:      General: No scleral icterus.     Conjunctiva/sclera: Conjunctivae normal.   Cardiovascular:      Rate and Rhythm: Normal rate and regular rhythm.      Heart sounds: No murmur heard.     No friction rub. No gallop.   Pulmonary:      Effort: Pulmonary effort is normal. No respiratory distress.      Breath sounds: Normal breath sounds. No stridor. No wheezing, rhonchi or rales.   Chest:      Chest wall: No tenderness.   Abdominal:      General: There is no distension.      Palpations: " Abdomen is soft. There is no mass.      Tenderness: There is no abdominal tenderness. There is no guarding or rebound.      Hernia: No hernia is present.   Musculoskeletal:         General: No swelling or tenderness.      Cervical back: Neck supple.   Skin:     General: Skin is warm and dry.      Capillary Refill: Capillary refill takes less than 2 seconds.   Neurological:      Mental Status: She is alert and oriented to person, place, and time.   Psychiatric:         Mood and Affect: Mood normal.          Discussion with Family: Attempted to update  (son) via phone. Left voicemail.     Discharge instructions/Information to patient and family:   See after visit summary for information provided to patient and family.      Provisions for Follow-Up Care:  See after visit summary for information related to follow-up care and any pertinent home health orders.      Mobility at time of Discharge:   Basic Mobility Inpatient Raw Score: 12  JH-HLM Goal: 4: Move to chair/commode  JH-HLM Achieved: 1: Laying in bed  HLM Goal achieved. Continue to encourage appropriate mobility.     Disposition:   Home    Planned Readmission: no     Discharge Statement:  I spent  minutes discharging the patient. This time was spent on the day of discharge. I had direct contact with the patient on the day of discharge. Greater than 50% of the total time was spent examining patient, answering all patient questions, arranging and discussing plan of care with patient as well as directly providing post-discharge instructions.  Additional time then spent on discharge activities.    Discharge Medications:  See after visit summary for reconciled discharge medications provided to patient and/or family.      **Please Note: This note may have been constructed using a voice recognition system**

## 2024-08-17 NOTE — PLAN OF CARE
Problem: PHYSICAL THERAPY ADULT  Goal: Performs mobility at highest level of function for planned discharge setting.  See evaluation for individualized goals.  Description: Treatment/Interventions: Functional transfer training, LE strengthening/ROM, Therapeutic exercise, Endurance training, Cognitive reorientation, Patient/family training, Bed mobility, Gait training, Elevations  Equipment Recommended: Walker       See flowsheet documentation for full assessment, interventions and recommendations.  8/17/2024 1455 by James Velazquez PTA  Outcome: Progressing  Note: Prognosis: Good  Problem List: Decreased strength, Decreased range of motion, Decreased endurance, Impaired balance, Decreased mobility, Pain  Assessment: The patient was able to take a few more steps today, but she continues to fatigue easily. She had improvement in her transfers and balance with increased standing tolerance. She benefits from rests in-between all activities in order to recover. She will certainly benefit from continued therapies in order to maximize her functional mobility and safety.        Rehab Resource Intensity Level, PT: II (Moderate Resource Intensity)    See flowsheet documentation for full assessment.     8/17/2024 1455 by James Velazquez PTA  Reactivated

## 2024-08-17 NOTE — ASSESSMENT & PLAN NOTE
Troponin in the ED elevated to 109->74  Patient without chest pain and EKG does not appear to show any acute ischemic appearing changes  Suspect this is some demand ischemia in the setting of COVID infection  Resume eliquis

## 2024-08-17 NOTE — ASSESSMENT & PLAN NOTE
Urinalysis was concern for possible infection and CT showing thickened bladder wall concerning for cystitis  Akins placed in ED  Completed course of ceftriaxone

## 2024-09-14 PROBLEM — J18.9 PNEUMONIA: Status: RESOLVED | Noted: 2024-08-15 | Resolved: 2024-09-14

## 2024-10-17 ENCOUNTER — APPOINTMENT (EMERGENCY)
Dept: CT IMAGING | Facility: HOSPITAL | Age: 89
End: 2024-10-17
Payer: MEDICARE

## 2024-10-17 ENCOUNTER — HOSPITAL ENCOUNTER (EMERGENCY)
Facility: HOSPITAL | Age: 89
Discharge: HOME | End: 2024-10-17
Attending: EMERGENCY MEDICINE
Payer: MEDICARE

## 2024-10-17 VITALS
HEART RATE: 71 BPM | SYSTOLIC BLOOD PRESSURE: 144 MMHG | OXYGEN SATURATION: 98 % | TEMPERATURE: 97.8 F | DIASTOLIC BLOOD PRESSURE: 63 MMHG | RESPIRATION RATE: 16 BRPM
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
## 2024-10-17 DIAGNOSIS — S09.90XA INJURY OF HEAD, INITIAL ENCOUNTER: ICD-10-CM

## 2024-10-17 PROCEDURE — 99285 EMERGENCY DEPT VISIT HI MDM: CPT

## 2024-10-17 PROCEDURE — 99284 EMERGENCY DEPT VISIT MOD MDM: CPT | Performed by: PHYSICIAN ASSISTANT

## 2024-10-17 PROCEDURE — 70486 CT MAXILLOFACIAL W/O DYE: CPT

## 2024-10-17 PROCEDURE — 70450 CT HEAD/BRAIN W/O DYE: CPT

## 2024-10-17 RX ORDER — OXYBUTYNIN CHLORIDE 5 MG/1
TABLET ORAL
COMMUNITY
Start: 2024-10-03

## 2024-10-17 RX ORDER — ACETAMINOPHEN 325 MG/1
650 TABLET ORAL EVERY 6 HOURS PRN
COMMUNITY
Start: 2024-09-11

## 2024-10-17 RX ORDER — ONDANSETRON 4 MG/1
4 TABLET, ORALLY DISINTEGRATING ORAL
COMMUNITY
Start: 2024-09-11

## 2024-10-17 RX ORDER — MIRABEGRON 25 MG/1
TABLET, FILM COATED, EXTENDED RELEASE ORAL DAILY
COMMUNITY
Start: 2024-10-07

## 2024-10-17 NOTE — DISCHARGE INSTRUCTIONS
DISCHARGE INSTRUCTIONS:    FOLLOW UP WITH YOUR PRIMARY CARE PROVIDER OR THE [facility]. MAKE AN APPOINTMENT TO BE SEEN.     TAKE TYLENOL FOR PAIN.    REST.    IF SYMPTOMS WORSEN OR NEW SYMPTOMS ARISE, RETURN TO THE ER TO BE SEEN.

## 2024-10-17 NOTE — ED PROVIDER NOTES
Time reflects when diagnosis was documented in both MDM as applicable and the Disposition within this note       Time User Action Codes Description Comment    10/17/2024  3:39 PM Julia Brooks Add [W19.XXXA] Fall, initial encounter     10/17/2024  3:39 PM Julia Brooks Add [S09.90XA] Injury of head, initial encounter           ED Disposition       ED Disposition   Discharge    Condition   Stable    Date/Time   Thu Oct 17, 2024  3:39 PM    Comment   Jennifer Patel discharge to home/self care.                   Assessment & Plan       Medical Decision Making  89y.o female presents to the ER for head injury occurring yesterday. Vitals are stable. Patient is in no acute distress. On exam, pupils are equal and reactive. EOM intact and non-tender. Abrasion to right eyebrow seen with periorbital ecchymosis. No swelling or deformity. Area is tender to palpation. No hemotympanum or signs of ear infection. No signs of throat infection. Normal rise with phonation. No trouble swallowing or handling secretions. No neck swelling. Breathing is non-labored. No tachypnea or accessory muscle use. Lungs are clear. Heart is regular rate and rhythm. Abdomen is soft and non-tender. No guarding, rigidity, distention or pulsatile masses palpated. No spinal tenderness to palpation. Patient neurologically intact. DDX consists of but not limited to: fracture, contusion, intracranial abnormality. Will CT head and facial bones.     1450 CT head without contrast    No acute intracranial abnormality.     Unchanged moderate microangiopathy.    1504 CT facial bones without contrast    Normal noncontrast CT of the facial bones.    1535      Informed patient of imaging findings. Will discharge. Patient agreeable.    The management plan was discussed in detail with the patient at bedside and all questions were answered.  Prior to discharge, we provided both verbal and written instructions.  We discussed with the patient the signs and  symptoms for which to return to the emergency department.  All questions were answered and patient was comfortable with the plan of care and discharged to home.  Instructed the patient to follow up with the primary care provider and/or specialist provided and their written instructions.  The patient verbalized understanding of our discussion and plan of care, and agrees to return to the Emergency Department for concerns and progression of illness.    At discharge, I instructed the patient to:  -follow up with pcp  -take Tylenol for pain  -rest  -return to the ER if symptoms worsened or new symptoms arose  Patient agreed to this plan and was stable at time of discharge.       Problems Addressed:  Fall, initial encounter: acute illness or injury  Injury of head, initial encounter: acute illness or injury    Amount and/or Complexity of Data Reviewed  Independent Historian:      Details: Patient is historian  Radiology: ordered. Decision-making details documented in ED Course.        ED Course as of 10/17/24 1614   Thu Oct 17, 2024   1450 CT head without contrast  No acute intracranial abnormality.     Unchanged moderate microangiopathy.     1504 CT facial bones without contrast  Normal noncontrast CT of the facial bones.       Medications - No data to display    ED Risk Strat Scores                           SBIRT 22yo+      Flowsheet Row Most Recent Value   Initial Alcohol Screen: US AUDIT-C     1. How often do you have a drink containing alcohol? 0 Filed at: 10/17/2024 1313   2. How many drinks containing alcohol do you have on a typical day you are drinking?  0 Filed at: 10/17/2024 1313   3a. Male UNDER 65: How often do you have five or more drinks on one occasion? 0 Filed at: 10/17/2024 1313   3b. FEMALE Any Age, or MALE 65+: How often do you have 4 or more drinks on one occassion? 0 Filed at: 10/17/2024 1313   Audit-C Score 0 Filed at: 10/17/2024 1313   [place]: How many times in the past year have you...    Used an  illegal drug or used a prescription medication for non-medical reasons? Never Filed at: 10/17/2024 1313                            History of Present Illness       Chief Complaint   Patient presents with    Fall     Per family, Pt fell yesterday when trying to get up from chair, reports legs gave out. Pt hit face, bruising to R eye. Pt takes eliquis.       Past Medical History:   Diagnosis Date    Disease of thyroid gland     Hyperlipidemia     Hypertension     MI (myocardial infarction) (HCC) 04/2018    Tubal pregnancy       Past Surgical History:   Procedure Laterality Date    HYSTERECTOMY        History reviewed. No pertinent family history.   Social History     Tobacco Use    Smoking status: Never    Smokeless tobacco: Never   Vaping Use    Vaping status: Never Used   Substance Use Topics    Alcohol use: Never    Drug use: Never      E-Cigarette/Vaping    E-Cigarette Use Never User       E-Cigarette/Vaping Substances      I have reviewed and agree with the history as documented.     89y.o female with PMH of thyroid disease, hyperlipidemia, HTN, MI and tubal pregnancy presents to the ER for fall occurring yesterday. Patient states she lost her balance and fell onto her right side. She complains of head injury causing her facial pain. She denies LOC. She does take Eliquis. She did take Tylenol for pain. She describes her pain as aching and non-radiating. Pain is constant. She denies fever, chills, URI symptoms, chest pain, dyspnea, N/V/D, abdominal pain, neck pain, back pain, weakness or paresthesias.      History provided by:  Patient   used: No        Review of Systems   Constitutional:  Negative for activity change, appetite change, chills and fever.   HENT:  Negative for congestion, drooling, ear discharge, ear pain, facial swelling, rhinorrhea and sore throat.    Eyes:  Negative for redness.   Respiratory:  Negative for cough and shortness of breath.    Cardiovascular:  Negative for chest  pain.   Gastrointestinal:  Negative for abdominal pain, diarrhea, nausea and vomiting.   Musculoskeletal:  Negative for neck stiffness.   Skin:  Negative for rash.   Allergic/Immunologic: Negative for food allergies.   Neurological:  Positive for headaches. Negative for weakness and numbness.           Objective       ED Triage Vitals [10/17/24 1300]   Temperature Pulse Blood Pressure Respirations SpO2 Patient Position - Orthostatic VS   97.8 °F (36.6 °C) 75 (!) 174/72 18 98 % Sitting      Temp Source Heart Rate Source BP Location FiO2 (%) Pain Score    Oral Monitor Right arm -- --      Vitals      Date and Time Temp Pulse SpO2 Resp BP Pain Score FACES Pain Rating User   10/17/24 1422 -- 71 98 % 16 144/63 -- -- TM   10/17/24 1300 97.8 °F (36.6 °C) 75 98 % 18 174/72 -- -- DW            Physical Exam  Vitals and nursing note reviewed.   Constitutional:       General: She is not in acute distress.     Appearance: She is not toxic-appearing.   HENT:      Head: Normocephalic. Abrasion and contusion present. No Mills's sign or laceration.        Right Ear: Tympanic membrane, ear canal and external ear normal. No drainage, swelling or tenderness. No foreign body. No hemotympanum. Tympanic membrane is not erythematous.      Left Ear: Tympanic membrane, ear canal and external ear normal. No drainage, swelling or tenderness. No foreign body. No hemotympanum. Tympanic membrane is not erythematous.      Nose: Nose normal.      Mouth/Throat:      Lips: Pink. No lesions.      Mouth: Mucous membranes are moist.      Pharynx: Uvula midline. No pharyngeal swelling, posterior oropharyngeal erythema or uvula swelling.      Tonsils: No tonsillar exudate or tonsillar abscesses.   Eyes:      General: Lids are normal.      Extraocular Movements: Extraocular movements intact.      Conjunctiva/sclera: Conjunctivae normal.      Pupils: Pupils are equal, round, and reactive to light.   Neck:      Trachea: Phonation normal. No tracheal  deviation.   Cardiovascular:      Rate and Rhythm: Normal rate and regular rhythm.      Heart sounds: Normal heart sounds, S1 normal and S2 normal. No murmur heard.     No friction rub. No gallop.   Pulmonary:      Effort: Pulmonary effort is normal. No respiratory distress.      Breath sounds: Normal breath sounds. No decreased breath sounds, wheezing, rhonchi or rales.   Chest:      Chest wall: No tenderness.   Abdominal:      General: Bowel sounds are normal. There is no distension.      Palpations: Abdomen is soft.      Tenderness: There is no abdominal tenderness. There is no guarding or rebound.   Musculoskeletal:         General: No deformity. Normal range of motion.      Cervical back: Normal, normal range of motion and neck supple.      Thoracic back: Normal.      Lumbar back: Normal.   Skin:     General: Skin is warm and dry.      Findings: No rash.   Neurological:      Mental Status: She is alert.      GCS: GCS eye subscore is 4. GCS verbal subscore is 5. GCS motor subscore is 6.      Cranial Nerves: No cranial nerve deficit, dysarthria or facial asymmetry.      Sensory: Sensation is intact. No sensory deficit.      Motor: No weakness, tremor, abnormal muscle tone or seizure activity.      Gait: Gait normal.   Psychiatric:         Mood and Affect: Mood normal.         Results Reviewed       None            CT head without contrast   Final Interpretation by Chau Heart MD (10/17 4478)      No acute intracranial abnormality.      Unchanged moderate microangiopathy.            Workstation performed: [id number]         CT facial bones without contrast   Final Interpretation by Chau Heart MD (10/17 1554)      Normal noncontrast CT of the facial bones.               Workstation performed: [id number]             Procedures    ED Medication and Procedure Management   Prior to Admission Medications   Prescriptions Last Dose Informant Patient Reported? Taking?   Calcium Carb-Cholecalciferol  600-10 MG-MCG TABS   Yes Yes   Sig: Take by mouth every other day   Eliquis 2.5 MG   Yes Yes   Sig: Take 2.5 mg by mouth 2 (two) times a day   Mirabegron ER (Myrbetriq) 25 MG TB24   Yes Yes   Sig: Take by mouth Daily   acetaminophen (TYLENOL) 325 mg tablet   Yes Yes   Sig: Take 650 mg by mouth every 6 (six) hours as needed   atorvastatin (LIPITOR) 40 mg tablet  Self Yes Yes   Sig: Take 40 mg by mouth daily   diltiazem (CARDIZEM CD) 240 mg 24 hr capsule   Yes Yes   Sig: Take 240 mg by mouth daily   docusate sodium (COLACE) 100 mg capsule More than a month  No No   Sig: Take 1 capsule (100 mg total) by mouth 2 (two) times a day   levothyroxine 88 mcg tablet  Self Yes Yes   Sig: Take 88 mcg by mouth daily   lisinopril (ZESTRIL) 20 mg tablet   No Yes   Sig: Take 1 tablet (20 mg total) by mouth daily   metoprolol succinate (TOPROL-XL) 100 mg 24 hr tablet  Self Yes Yes   Sig: Take 100 mg by mouth 2 (two) times a day   mirtazapine (REMERON) 15 mg tablet   No Yes   Sig: Take 1 tablet (15 mg total) by mouth daily at bedtime   mometasone (NASONEX) 50 mcg/act nasal spray   Yes Yes   Si sprays into each nostril daily   ondansetron (ZOFRAN-ODT) 4 mg disintegrating tablet   Yes Yes   Sig: Take 4 mg by mouth   oxybutynin (DITROPAN) 5 mg tablet   Yes Yes   Si Refill(s), Type: Maintenance   sertraline (ZOLOFT) 50 mg tablet   Yes Yes   Sig: Take 50 mg by mouth daily      Facility-Administered Medications: None     Discharge Medication List as of 10/17/2024  3:41 PM        CONTINUE these medications which have NOT CHANGED    Details   acetaminophen (TYLENOL) 325 mg tablet Take 650 mg by mouth every 6 (six) hours as needed, Starting 2024, Historical Med      atorvastatin (LIPITOR) 40 mg tablet Take 40 mg by mouth daily, Starting 2018, Historical Med      Calcium Carb-Cholecalciferol 600-10 MG-MCG TABS Take by mouth every other day, Starting Thu 2024, Historical Med      diltiazem (CARDIZEM CD) 240 mg 24  hr capsule Take 240 mg by mouth daily, Starting Sat 7/27/2024, Historical Med      Eliquis 2.5 MG Take 2.5 mg by mouth 2 (two) times a day, Historical Med      levothyroxine 88 mcg tablet Take 88 mcg by mouth daily, Starting Tue 7/24/2018, Historical Med      lisinopril (ZESTRIL) 20 mg tablet Take 1 tablet (20 mg total) by mouth daily, Starting Sat 8/3/2024, No Print      metoprolol succinate (TOPROL-XL) 100 mg 24 hr tablet Take 100 mg by mouth 2 (two) times a day, Starting Thu 10/18/2018, Historical Med      Mirabegron ER (Myrbetriq) 25 MG TB24 Take by mouth Daily, Starting Mon 10/7/2024, Historical Med      mirtazapine (REMERON) 15 mg tablet Take 1 tablet (15 mg total) by mouth daily at bedtime, Starting Fri 8/2/2024, No Print      mometasone (NASONEX) 50 mcg/act nasal spray 2 sprays into each nostril daily, Starting Thu 6/13/2024, Historical Med      ondansetron (ZOFRAN-ODT) 4 mg disintegrating tablet Take 4 mg by mouth, Starting Wed 9/11/2024, Historical Med      oxybutynin (DITROPAN) 5 mg tablet 0 Refill(s), Type: Maintenance, Historical Med      sertraline (ZOLOFT) 50 mg tablet Take 50 mg by mouth daily, Starting Fri 7/12/2024, Historical Med      docusate sodium (COLACE) 100 mg capsule Take 1 capsule (100 mg total) by mouth 2 (two) times a day, Starting Thu 8/15/2024, Normal           No discharge procedures on file.  ED SEPSIS DOCUMENTATION   Time reflects when diagnosis was documented in both MDM as applicable and the Disposition within this note       Time User Action Codes Description Comment    10/17/2024  3:39 PM Julia Brooks Add [W19.XXXA] Fall, initial encounter     10/17/2024  3:39 PM Julia Brooks Add [S09.90XA] Injury of head, initial encounter                  Julia Brooks PA-C  10/17/24 0202

## 2025-07-17 ENCOUNTER — NEW PATIENT COMPREHENSIVE (OUTPATIENT)
Dept: URBAN - METROPOLITAN AREA CLINIC 6 | Facility: CLINIC | Age: 89
End: 2025-07-17

## 2025-07-17 DIAGNOSIS — H35.3111: ICD-10-CM

## 2025-07-17 DIAGNOSIS — H34.8120: ICD-10-CM

## 2025-07-17 DIAGNOSIS — H25.813: ICD-10-CM

## 2025-07-17 PROCEDURE — 99204 OFFICE O/P NEW MOD 45 MIN: CPT

## 2025-07-17 ASSESSMENT — TONOMETRY
OD_IOP_MMHG: 10
OS_IOP_MMHG: 7

## 2025-07-17 ASSESSMENT — VISUAL ACUITY
OD_PH: 20/70+2
OU_CC: J7
OD_CC: 20/100+1

## 2025-08-27 ENCOUNTER — 1 DAY POST-OP (OUTPATIENT)
Dept: URBAN - METROPOLITAN AREA CLINIC 6 | Facility: CLINIC | Age: 89
End: 2025-08-27

## 2025-08-27 DIAGNOSIS — Z96.1: ICD-10-CM

## 2025-08-27 PROCEDURE — 99024 POSTOP FOLLOW-UP VISIT: CPT

## 2025-08-27 ASSESSMENT — TONOMETRY
OD_IOP_MMHG: 7
OS_IOP_MMHG: 7

## 2025-08-27 ASSESSMENT — VISUAL ACUITY: OD_SC: 20/100-2

## (undated) RX ORDER — SODIUM CHLORIDE 50 MG/ML
1 SOLUTION OPHTHALMIC
Start: 2025-08-27